# Patient Record
Sex: MALE | Race: WHITE | NOT HISPANIC OR LATINO | URBAN - METROPOLITAN AREA
[De-identification: names, ages, dates, MRNs, and addresses within clinical notes are randomized per-mention and may not be internally consistent; named-entity substitution may affect disease eponyms.]

---

## 2019-10-22 ENCOUNTER — CONSULT (OUTPATIENT)
Dept: ONCOLOGY | Facility: CLINIC | Age: 49
End: 2019-10-22

## 2019-10-22 ENCOUNTER — APPOINTMENT (OUTPATIENT)
Dept: LAB | Facility: HOSPITAL | Age: 49
End: 2019-10-22

## 2019-10-22 VITALS
BODY MASS INDEX: 32.56 KG/M2 | SYSTOLIC BLOOD PRESSURE: 130 MMHG | OXYGEN SATURATION: 95 % | WEIGHT: 240.4 LBS | RESPIRATION RATE: 16 BRPM | TEMPERATURE: 97.5 F | HEART RATE: 86 BPM | HEIGHT: 72 IN | DIASTOLIC BLOOD PRESSURE: 90 MMHG

## 2019-10-22 DIAGNOSIS — R16.0 LIVER MASS: Primary | ICD-10-CM

## 2019-10-22 DIAGNOSIS — C18.7 PRIMARY MALIGNANT NEOPLASM OF SIGMOID COLON (HCC): Primary | ICD-10-CM

## 2019-10-22 LAB
ALBUMIN SERPL-MCNC: 4.1 G/DL (ref 3.5–5.2)
ALBUMIN/GLOB SERPL: 1 G/DL (ref 1.1–2.4)
ALP SERPL-CCNC: 151 U/L (ref 38–116)
ALT SERPL W P-5'-P-CCNC: 32 U/L (ref 0–41)
ANION GAP SERPL CALCULATED.3IONS-SCNC: 13 MMOL/L (ref 5–15)
AST SERPL-CCNC: 46 U/L (ref 0–40)
BASOPHILS # BLD AUTO: 0.07 10*3/MM3 (ref 0–0.2)
BASOPHILS NFR BLD AUTO: 0.8 % (ref 0–1.5)
BILIRUB SERPL-MCNC: 0.7 MG/DL (ref 0.2–1.2)
BUN BLD-MCNC: 8 MG/DL (ref 6–20)
BUN/CREAT SERPL: 8.2 (ref 7.3–30)
CALCIUM SPEC-SCNC: 9.5 MG/DL (ref 8.5–10.2)
CEA SERPL-MCNC: 4399 NG/ML
CHLORIDE SERPL-SCNC: 96 MMOL/L (ref 98–107)
CO2 SERPL-SCNC: 27 MMOL/L (ref 22–29)
CREAT BLD-MCNC: 0.97 MG/DL (ref 0.7–1.3)
DEPRECATED RDW RBC AUTO: 41.1 FL (ref 37–54)
EOSINOPHIL # BLD AUTO: 0.68 10*3/MM3 (ref 0–0.4)
EOSINOPHIL NFR BLD AUTO: 8.1 % (ref 0.3–6.2)
ERYTHROCYTE [DISTWIDTH] IN BLOOD BY AUTOMATED COUNT: 14.5 % (ref 12.3–15.4)
GFR SERPL CREATININE-BSD FRML MDRD: 82 ML/MIN/1.73
GLOBULIN UR ELPH-MCNC: 4.2 GM/DL (ref 1.8–3.5)
GLUCOSE BLD-MCNC: 95 MG/DL (ref 74–124)
HCT VFR BLD AUTO: 45.3 % (ref 37.5–51)
HGB BLD-MCNC: 14.7 G/DL (ref 13–17.7)
IMM GRANULOCYTES # BLD AUTO: 0.05 10*3/MM3 (ref 0–0.05)
IMM GRANULOCYTES NFR BLD AUTO: 0.6 % (ref 0–0.5)
LYMPHOCYTES # BLD AUTO: 1.83 10*3/MM3 (ref 0.7–3.1)
LYMPHOCYTES NFR BLD AUTO: 21.9 % (ref 19.6–45.3)
MCH RBC QN AUTO: 25.7 PG (ref 26.6–33)
MCHC RBC AUTO-ENTMCNC: 32.5 G/DL (ref 31.5–35.7)
MCV RBC AUTO: 79.1 FL (ref 79–97)
MONOCYTES # BLD AUTO: 0.85 10*3/MM3 (ref 0.1–0.9)
MONOCYTES NFR BLD AUTO: 10.2 % (ref 5–12)
NEUTROPHILS # BLD AUTO: 4.89 10*3/MM3 (ref 1.7–7)
NEUTROPHILS NFR BLD AUTO: 58.4 % (ref 42.7–76)
NRBC BLD AUTO-RTO: 0 /100 WBC (ref 0–0.2)
PLATELET # BLD AUTO: 339 10*3/MM3 (ref 140–450)
PMV BLD AUTO: 9.2 FL (ref 6–12)
POTASSIUM BLD-SCNC: 4.4 MMOL/L (ref 3.5–4.7)
PROT SERPL-MCNC: 8.3 G/DL (ref 6.3–8)
RBC # BLD AUTO: 5.73 10*6/MM3 (ref 4.14–5.8)
SODIUM BLD-SCNC: 136 MMOL/L (ref 134–145)
WBC NRBC COR # BLD: 8.37 10*3/MM3 (ref 3.4–10.8)

## 2019-10-22 PROCEDURE — 80053 COMPREHEN METABOLIC PANEL: CPT | Performed by: INTERNAL MEDICINE

## 2019-10-22 PROCEDURE — G0463 HOSPITAL OUTPT CLINIC VISIT: HCPCS | Performed by: INTERNAL MEDICINE

## 2019-10-22 PROCEDURE — 36415 COLL VENOUS BLD VENIPUNCTURE: CPT | Performed by: INTERNAL MEDICINE

## 2019-10-22 PROCEDURE — 36416 COLLJ CAPILLARY BLOOD SPEC: CPT

## 2019-10-22 PROCEDURE — 85025 COMPLETE CBC W/AUTO DIFF WBC: CPT

## 2019-10-22 PROCEDURE — 82378 CARCINOEMBRYONIC ANTIGEN: CPT | Performed by: INTERNAL MEDICINE

## 2019-10-22 PROCEDURE — 99205 OFFICE O/P NEW HI 60 MIN: CPT | Performed by: INTERNAL MEDICINE

## 2019-10-22 RX ORDER — TRAMADOL HYDROCHLORIDE 50 MG/1
50 TABLET ORAL EVERY 6 HOURS PRN
COMMUNITY
End: 2019-11-18

## 2019-10-22 RX ORDER — ONDANSETRON HYDROCHLORIDE 8 MG/1
TABLET, FILM COATED ORAL
Refills: 3 | COMMUNITY
Start: 2019-10-16 | End: 2020-01-20 | Stop reason: SDDI

## 2019-10-22 RX ORDER — RANITIDINE 150 MG/1
150 TABLET ORAL 2 TIMES DAILY
COMMUNITY
End: 2020-06-01

## 2019-10-22 RX ORDER — LISINOPRIL 10 MG/1
10 TABLET ORAL DAILY
COMMUNITY

## 2019-10-22 RX ORDER — AMLODIPINE BESYLATE 5 MG/1
5 TABLET ORAL DAILY
COMMUNITY

## 2019-10-22 RX ORDER — OXYCODONE HYDROCHLORIDE 5 MG/1
TABLET ORAL
Refills: 0 | COMMUNITY
Start: 2019-10-16 | End: 2019-11-18

## 2019-10-22 RX ORDER — PROMETHAZINE HYDROCHLORIDE 25 MG/1
TABLET ORAL
Refills: 4 | COMMUNITY
Start: 2019-10-16

## 2019-10-22 RX ORDER — ATORVASTATIN CALCIUM 40 MG/1
40 TABLET, FILM COATED ORAL DAILY
COMMUNITY

## 2019-10-22 NOTE — PROGRESS NOTES
REFERRING PROVIDER:    Jami Lazo MD  [address] [place] [address], [place] [address]    REASON FOR CONSULTATION: Metastatic colon cancer    HISTORY OF PRESENT ILLNESS:  Silver Sloan is a 49 y.o. male who is referred today to discuss treatment options for metastatic colon cancer.    About a month prior to presenting, he developed epigastric pain that gradually worsened to include right upper quadrant abdominal pain.  An H2 blocker did not help.  He developed anorexia and diarrhea.  He denies any change in his stool or constipation.    He lives in [facility].  He presented to [facility] for further care.  CT imaging indicated liver lesions.  He had a CT-guided liver biopsy performed on 10/14/2019 with pathology consistent with adenocarcinoma metastatic from colon.  He had a PET scan performed on 10/18/2019 which revealed a 2.7 cm mass in the sigmoid colon thought to be the primary malignancy.  The liver is enlarged measuring 22 cm.  There is a 14.3 cm mass in the right lobe of the liver with an SUV of 6.52 and an 11 cm mass in the left lobe of the liver with an SUV of 6.38.  There is a 1.5 cm lesion in the inferior right lobe of the liver with an SUV of 4.39.  There was no other evidence for metastatic disease.  There is no lymphadenopathy in the neck chest abdomen pelvis or retroperitoneum.  Multinodular goiter with thyromegaly was visualized.  No bowel obstruction was visualized.  An umbilical hernia and a left inguinal hernia were visualized.  There is some degenerative disc disease in the lumbar spine.    He saw an oncologist in [place].  He has family that lives here in [place] and he believes that he wants to be treated here in [place].  A Mediport is scheduled in [place] on Friday.    His right upper quadrant abdominal pain is adequately controlled with oxycodone 5 mg tablets 1-2 every 6 hours as needed.  He does continue to have some sharp right lower  chest pain with deep breaths.  He has nausea that is well controlled with Zofran and Phenergan.  He is having bowel movements with a bowel regimen.  He has lost about 25 pounds over the past month or so.            Past Medical History:   Diagnosis Date   • Hyperlipidemia    • Hypertension    • Metastatic adenocarcinoma (CMS/HCC) 2019    Liver tumor       Past Surgical History:   Procedure Laterality Date   • LIVER BIOPSY  2019    Core needle biopsy       SOCIAL HISTORY:   reports that he has never smoked. He has never used smokeless tobacco. He reports that he drinks alcohol. His drug history is not on file.    FAMILY HISTORY:  family history includes Cancer in an other family member.  Maternal grandfather with a history of colon cancer.  Brother with a history of multiple colon polyps.    ALLERGIES:  No Known Allergies    MEDICATIONS:  The medication list has been reviewed with the patient by the medical assistant, and the list has been updated in the electronic medical record, which I reviewed.  Medication dosages and frequencies were confirmed to be accurate.    Review of Systems   Review of Systems   Constitutional: Positive for appetite change, fatigue and unexpected weight change. Negative for chills and fever.   HENT: Negative for congestion, dental problem, ear pain, hearing loss, mouth sores, nosebleeds, postnasal drip, rhinorrhea, tinnitus and trouble swallowing.    Eyes: Negative.    Respiratory: Negative for cough, chest tightness, shortness of breath, wheezing and stridor.    Cardiovascular: Positive for chest pain. Negative for palpitations and leg swelling.   Gastrointestinal: Positive for abdominal pain, diarrhea and nausea. Negative for abdominal distention, blood in stool, constipation and vomiting.   Endocrine: Negative.    Genitourinary: Negative for decreased urine volume, difficulty urinating, dysuria, flank pain, frequency, hematuria, scrotal swelling, testicular pain and urgency.  "  Musculoskeletal: Negative for arthralgias, back pain and joint swelling.   Allergic/Immunologic: Negative.    Neurological: Negative for dizziness, seizures, syncope, weakness, light-headedness, numbness and headaches.   Hematological: Negative for adenopathy. Does not bruise/bleed easily.   Psychiatric/Behavioral: Negative for confusion and sleep disturbance. The patient is not nervous/anxious.    All other systems reviewed and are negative.      Vitals:    10/22/19 0959   BP: 130/90   Pulse: 86   Resp: 16   Temp: 97.5 °F (36.4 °C)   TempSrc: Oral   SpO2: 95%   Weight: 109 kg (240 lb 6.4 oz)   Height: 183 cm (72.05\")   PainSc: 0-No pain  Comment: liver mass       Physical Exam   Constitutional: He is oriented to person, place, and time. He appears well-developed and well-nourished. No distress.   HENT:   Head: Normocephalic and atraumatic. Hair is normal.   Mouth/Throat: Oropharynx is clear and moist.   Eyes: Conjunctivae, EOM and lids are normal. Pupils are equal.   Neck: Neck supple. No JVD present. No thyroid mass and no thyromegaly present.   Cardiovascular: Normal rate and regular rhythm. Exam reveals no gallop and no friction rub.   No murmur heard.  Pulmonary/Chest: Effort normal and breath sounds normal. No respiratory distress. He has no wheezes. He has no rales.   Abdominal: Soft. He exhibits no distension and no mass. There is no splenomegaly or hepatomegaly. There is tenderness in the right upper quadrant. There is no guarding.   Musculoskeletal: Normal range of motion. He exhibits no edema, tenderness or deformity.   Lymphadenopathy:     He has no cervical adenopathy.     He has no axillary adenopathy.        Right: No inguinal and no supraclavicular adenopathy present.        Left: No inguinal and no supraclavicular adenopathy present.   Neurological: He is alert and oriented to person, place, and time. He has normal strength.   Skin: Skin is warm and dry. No rash noted.   Psychiatric: He has a " normal mood and affect. His behavior is normal. Judgment and thought content normal. Cognition and memory are normal.   Nursing note and vitals reviewed.      DIAGNOSTIC DATA:    Results for orders placed or performed in visit on 10/22/19   CBC Auto Differential   Result Value Ref Range    WBC 8.37 3.40 - 10.80 10*3/mm3    RBC 5.73 4.14 - 5.80 10*6/mm3    Hemoglobin 14.7 13.0 - 17.7 g/dL    Hematocrit 45.3 37.5 - 51.0 %    MCV 79.1 79.0 - 97.0 fL    MCH 25.7 (L) 26.6 - 33.0 pg    MCHC 32.5 31.5 - 35.7 g/dL    RDW 14.5 12.3 - 15.4 %    RDW-SD 41.1 37.0 - 54.0 fl    MPV 9.2 6.0 - 12.0 fL    Platelets 339 140 - 450 10*3/mm3    Neutrophil % 58.4 42.7 - 76.0 %    Lymphocyte % 21.9 19.6 - 45.3 %    Monocyte % 10.2 5.0 - 12.0 %    Eosinophil % 8.1 (H) 0.3 - 6.2 %    Basophil % 0.8 0.0 - 1.5 %    Immature Grans % 0.6 (H) 0.0 - 0.5 %    Neutrophils, Absolute 4.89 1.70 - 7.00 10*3/mm3    Lymphocytes, Absolute 1.83 0.70 - 3.10 10*3/mm3    Monocytes, Absolute 0.85 0.10 - 0.90 10*3/mm3    Eosinophils, Absolute 0.68 (H) 0.00 - 0.40 10*3/mm3    Basophils, Absolute 0.07 0.00 - 0.20 10*3/mm3    Immature Grans, Absolute 0.05 0.00 - 0.05 10*3/mm3    nRBC 0.0 0.0 - 0.2 /100 WBC       IMAGING:    None available for review    ASSESSMENT:  This is a 49 y.o. male with:  1.  Metastatic colon cancer: There is a 2.7 cm hypermetabolic lesion in the sigmoid colon on PET scan.  He has not had a colonoscopy or biopsy of this.  He presented with right upper quadrant abdominal pain.  He has 3 lesions in the liver 2 of which are very large and 1 of which is much smaller.  A biopsy of 1 of these lesions has revealed metastatic colon cancer.  Further molecular testing has been sent in [place].  He lives in [place] but has a sibling that lives here in [place].  He desires to be treated here in [place].  I advised him, his brother, and his sister today that this is treatable but not likely to be curable.  His brother inquired about the  possibility of local therapy to liver.  I do think this is reasonable to consider but I think that we will need to utilize systemic therapy to shrink the tumors prior to considering this.  Nonetheless, they desire referral to Dr. Painting today and I have made this referral today.  [facility] in [place].  I certainly agree with this.  We will obtain some baseline labs today including a CMP and CEA.  Due to his young age and family history I will refer him to genetic counseling.  We will request the PET images for my review.  I discussed with him and his siblings today palliative therapy with FOLFIRI.  We may tailor this based on molecular results and depending on his response and plans for local therapy to the liver.  We might consider adding bevacizumab or cetuximab/Panitumumab.  He will have an education session and we will plan to get started in the next couple of weeks.  I advised him and his siblings today that intent is palliative.  They agreed to proceed.  2.  Cancer related pain: He continues oxycodone 5 to 10 mg every 6 hours as needed.  He states his pain is adequately controlled on this regimen.  When he requires a refill my office will refill this and I advised him and his siblings today that our office will be the only office that can fill his pain medication at that point.  His pain should improve as we decrease the size of the liver tumors with chemotherapy.    PLAN:   1.  Refer to Dr. Painting with surgical oncology to consider the possibility of local therapy to the liver and perhaps the colon at some point.  The patient's brother who was present today has worked with Dr. Painting in the past.  2.  We will request his PET images for my review and to be uploaded into our imaging system.  3.  We await the results of the genetic panel sent by pathology in [place].  4.  Refer to genetic counseling  5.  Baseline labs today including a CMP and CEA  6.  He has [facility]  placement scheduled on Friday in [place] this week and I certainly agree with this.  7.  We will arrange an education session for NIMISHA with a nurse practitioner and have him follow-up with a nurse practitioner in the next week or 2 to initiate therapy.  8.  I will see him back with cycle #2 of therapy.  CT imaging after cycle #4.

## 2019-10-29 ENCOUNTER — OFFICE VISIT (OUTPATIENT)
Dept: ONCOLOGY | Facility: CLINIC | Age: 49
End: 2019-10-29

## 2019-10-29 ENCOUNTER — APPOINTMENT (OUTPATIENT)
Dept: LAB | Facility: HOSPITAL | Age: 49
End: 2019-10-29

## 2019-10-29 VITALS — BODY MASS INDEX: 32.44 KG/M2 | WEIGHT: 239.5 LBS

## 2019-10-29 DIAGNOSIS — C18.7 PRIMARY MALIGNANT NEOPLASM OF SIGMOID COLON (HCC): Primary | ICD-10-CM

## 2019-10-29 PROCEDURE — G0463 HOSPITAL OUTPT CLINIC VISIT: HCPCS | Performed by: NURSE PRACTITIONER

## 2019-10-29 PROCEDURE — 99215 OFFICE O/P EST HI 40 MIN: CPT | Performed by: NURSE PRACTITIONER

## 2019-10-29 NOTE — PROGRESS NOTES
____________________PATIENT EDUCATION____________________    PATIENT EDUCATION:  Today I met with the patient to discuss the chemotherapy regimen recommended for treatment of his disease.  The patient was given explanation of treatment premed side effects including office policy that prohibits patients to drive if sedating medications are administered, MD explanation given regarding benefits, side effects, toxicities and goals of treatment.  The patient received a Chemotherapy/Biotherapy Plan Summary including diagnosis and specific treatment plan.    SIDE EFFECTS:  Common side effects were discussed with the patient and/or significant other.  Discussion included hair loss/discoloration, anemia/fatigue, infection/chills/fever, appetite, bleeding risk/precautions, constipation, diarrhea, mouth sores, taste alteration, loss of appetite,nausea/vomiting, peripheral neuropathy, skin/nail changes, rash, muscle aches/weakness, photosensitivity, weight gain/loss, hearing loss, dizziness, menopausal symptoms, menstrrual irregularity, sterility, high blood pressure, heart damage, liver damage, lung damage, kidney damage, DVT/PE risk, fluid retention, pleural/pericardial effusion, somnolence, electrolyte/LFT imbalance, vein exercises and/or the possible need for vascular access/port placement.  The patient was advice that although uncommon, leakage of an infused medication from the vein or venous access device (port) may lead to skin breakdown and/or other tissue damage.  The patient was advised that he/she may have pain, bleeding, and/or bruising from the insertion of a needle in their vein or venous access device (port).  The patient was further advised that, in spite of proper technique, infection with redness and irritation may rarely occur at the site where the needle was inserted.  The patient was advised that if complications occur, additional medical treatment is available.    Discussion also included side effects  specific to drugs in the treatment plan, specifically a few, leucovorin, Irinotecan.    Reproductive risks were discussed, including appropriate use of birth control and protection during sexual relations.  Physical Exam   Constitutional: He is oriented to person, place, and time. He appears well-developed and well-nourished. No distress.   Pulmonary/Chest: Effort normal. No respiratory distress.   Musculoskeletal: He exhibits no edema.   Neurological: He is alert and oriented to person, place, and time.   Skin: Skin is warm and dry.   Psychiatric: He has a normal mood and affect.         Survivorship referral placed if appropriate no.    A total of 45 minutes were spent with the patient, with 100% of time spent in education and counseling.

## 2019-10-31 PROBLEM — Z45.2 ENCOUNTER FOR FITTING AND ADJUSTMENT OF VASCULAR CATHETER: Status: ACTIVE | Noted: 2019-10-31

## 2019-10-31 RX ORDER — SODIUM CHLORIDE 0.9 % (FLUSH) 0.9 %
10 SYRINGE (ML) INJECTION AS NEEDED
Status: CANCELLED | OUTPATIENT
Start: 2019-10-31

## 2019-11-04 ENCOUNTER — INFUSION (OUTPATIENT)
Dept: ONCOLOGY | Facility: HOSPITAL | Age: 49
End: 2019-11-04

## 2019-11-04 ENCOUNTER — OFFICE VISIT (OUTPATIENT)
Dept: ONCOLOGY | Facility: CLINIC | Age: 49
End: 2019-11-04

## 2019-11-04 VITALS
TEMPERATURE: 98.2 F | HEIGHT: 72 IN | DIASTOLIC BLOOD PRESSURE: 85 MMHG | SYSTOLIC BLOOD PRESSURE: 138 MMHG | WEIGHT: 231.4 LBS | RESPIRATION RATE: 16 BRPM | HEART RATE: 98 BPM | BODY MASS INDEX: 31.34 KG/M2 | OXYGEN SATURATION: 97 %

## 2019-11-04 DIAGNOSIS — C18.7 PRIMARY MALIGNANT NEOPLASM OF SIGMOID COLON (HCC): ICD-10-CM

## 2019-11-04 DIAGNOSIS — C18.7 PRIMARY MALIGNANT NEOPLASM OF SIGMOID COLON (HCC): Primary | ICD-10-CM

## 2019-11-04 LAB
ALBUMIN SERPL-MCNC: 3.8 G/DL (ref 3.5–5.2)
ALBUMIN/GLOB SERPL: 0.9 G/DL (ref 1.1–2.4)
ALP SERPL-CCNC: 220 U/L (ref 38–116)
ALT SERPL W P-5'-P-CCNC: 37 U/L (ref 0–41)
ANION GAP SERPL CALCULATED.3IONS-SCNC: 15.1 MMOL/L (ref 5–15)
AST SERPL-CCNC: 50 U/L (ref 0–40)
BASOPHILS # BLD AUTO: 0.06 10*3/MM3 (ref 0–0.2)
BASOPHILS NFR BLD AUTO: 0.6 % (ref 0–1.5)
BILIRUB SERPL-MCNC: 0.7 MG/DL (ref 0.2–1.2)
BUN BLD-MCNC: 11 MG/DL (ref 6–20)
BUN/CREAT SERPL: 12.4 (ref 7.3–30)
CALCIUM SPEC-SCNC: 9.2 MG/DL (ref 8.5–10.2)
CHLORIDE SERPL-SCNC: 100 MMOL/L (ref 98–107)
CO2 SERPL-SCNC: 22.9 MMOL/L (ref 22–29)
CREAT BLD-MCNC: 0.89 MG/DL (ref 0.7–1.3)
DEPRECATED RDW RBC AUTO: 42.5 FL (ref 37–54)
EOSINOPHIL # BLD AUTO: 1.05 10*3/MM3 (ref 0–0.4)
EOSINOPHIL NFR BLD AUTO: 10.3 % (ref 0.3–6.2)
ERYTHROCYTE [DISTWIDTH] IN BLOOD BY AUTOMATED COUNT: 14.6 % (ref 12.3–15.4)
GFR SERPL CREATININE-BSD FRML MDRD: 110 ML/MIN/1.73
GFR SERPL CREATININE-BSD FRML MDRD: 91 ML/MIN/1.73
GLOBULIN UR ELPH-MCNC: 4.4 GM/DL (ref 1.8–3.5)
GLUCOSE BLD-MCNC: 165 MG/DL (ref 74–124)
HCT VFR BLD AUTO: 44.5 % (ref 37.5–51)
HGB BLD-MCNC: 14.1 G/DL (ref 13–17.7)
IMM GRANULOCYTES # BLD AUTO: 0.05 10*3/MM3 (ref 0–0.05)
IMM GRANULOCYTES NFR BLD AUTO: 0.5 % (ref 0–0.5)
LYMPHOCYTES # BLD AUTO: 1.7 10*3/MM3 (ref 0.7–3.1)
LYMPHOCYTES NFR BLD AUTO: 16.7 % (ref 19.6–45.3)
MCH RBC QN AUTO: 25.7 PG (ref 26.6–33)
MCHC RBC AUTO-ENTMCNC: 31.7 G/DL (ref 31.5–35.7)
MCV RBC AUTO: 81.1 FL (ref 79–97)
MONOCYTES # BLD AUTO: 0.73 10*3/MM3 (ref 0.1–0.9)
MONOCYTES NFR BLD AUTO: 7.2 % (ref 5–12)
NEUTROPHILS # BLD AUTO: 6.58 10*3/MM3 (ref 1.7–7)
NEUTROPHILS NFR BLD AUTO: 64.7 % (ref 42.7–76)
NRBC BLD AUTO-RTO: 0 /100 WBC (ref 0–0.2)
PLATELET # BLD AUTO: 359 10*3/MM3 (ref 140–450)
PMV BLD AUTO: 8.7 FL (ref 6–12)
POTASSIUM BLD-SCNC: 3.8 MMOL/L (ref 3.5–4.7)
PROT SERPL-MCNC: 8.2 G/DL (ref 6.3–8)
RBC # BLD AUTO: 5.49 10*6/MM3 (ref 4.14–5.8)
SODIUM BLD-SCNC: 138 MMOL/L (ref 134–145)
WBC NRBC COR # BLD: 10.17 10*3/MM3 (ref 3.4–10.8)

## 2019-11-04 PROCEDURE — 96411 CHEMO IV PUSH ADDL DRUG: CPT | Performed by: NURSE PRACTITIONER

## 2019-11-04 PROCEDURE — 96375 TX/PRO/DX INJ NEW DRUG ADDON: CPT | Performed by: NURSE PRACTITIONER

## 2019-11-04 PROCEDURE — 80053 COMPREHEN METABOLIC PANEL: CPT

## 2019-11-04 PROCEDURE — 25010000002 PROMETHAZINE PER 50 MG: Performed by: NURSE PRACTITIONER

## 2019-11-04 PROCEDURE — 25010000002 PALONOSETRON PER 25 MCG: Performed by: INTERNAL MEDICINE

## 2019-11-04 PROCEDURE — 25010000002 ATROPINE PER 0.01 MG: Performed by: INTERNAL MEDICINE

## 2019-11-04 PROCEDURE — 85025 COMPLETE CBC W/AUTO DIFF WBC: CPT

## 2019-11-04 PROCEDURE — 25010000002 IRINOTECAN PER 20 MG: Performed by: INTERNAL MEDICINE

## 2019-11-04 PROCEDURE — 96415 CHEMO IV INFUSION ADDL HR: CPT | Performed by: NURSE PRACTITIONER

## 2019-11-04 PROCEDURE — 96416 CHEMO PROLONG INFUSE W/PUMP: CPT | Performed by: NURSE PRACTITIONER

## 2019-11-04 PROCEDURE — 99213 OFFICE O/P EST LOW 20 MIN: CPT | Performed by: NURSE PRACTITIONER

## 2019-11-04 PROCEDURE — 96368 THER/DIAG CONCURRENT INF: CPT | Performed by: NURSE PRACTITIONER

## 2019-11-04 PROCEDURE — 25010000002 LEUCOVORIN CALCIUM PER 50 MG: Performed by: INTERNAL MEDICINE

## 2019-11-04 PROCEDURE — 25010000002 FLUOROURACIL PER 500 MG: Performed by: INTERNAL MEDICINE

## 2019-11-04 PROCEDURE — 25010000002 DEXAMETHASONE SODIUM PHOSPHATE 100 MG/10ML SOLUTION: Performed by: INTERNAL MEDICINE

## 2019-11-04 PROCEDURE — 96413 CHEMO IV INFUSION 1 HR: CPT | Performed by: NURSE PRACTITIONER

## 2019-11-04 RX ORDER — PALONOSETRON 0.05 MG/ML
0.25 INJECTION, SOLUTION INTRAVENOUS ONCE
Status: CANCELLED | OUTPATIENT
Start: 2019-11-04

## 2019-11-04 RX ORDER — ATROPINE SULFATE 0.4 MG/ML
0.25 AMPUL (ML) INJECTION
Status: DISCONTINUED | OUTPATIENT
Start: 2019-11-04 | End: 2019-11-04 | Stop reason: HOSPADM

## 2019-11-04 RX ORDER — SODIUM CHLORIDE 9 MG/ML
250 INJECTION, SOLUTION INTRAVENOUS ONCE
Status: COMPLETED | OUTPATIENT
Start: 2019-11-04 | End: 2019-11-04

## 2019-11-04 RX ORDER — FLUOROURACIL 50 MG/ML
400 INJECTION, SOLUTION INTRAVENOUS ONCE
Status: COMPLETED | OUTPATIENT
Start: 2019-11-04 | End: 2019-11-04

## 2019-11-04 RX ORDER — SODIUM CHLORIDE 9 MG/ML
250 INJECTION, SOLUTION INTRAVENOUS ONCE
Status: CANCELLED | OUTPATIENT
Start: 2019-11-04

## 2019-11-04 RX ORDER — FLUOROURACIL 50 MG/ML
400 INJECTION, SOLUTION INTRAVENOUS ONCE
Status: CANCELLED | OUTPATIENT
Start: 2019-11-04

## 2019-11-04 RX ORDER — ATROPINE SULFATE 1 MG/ML
0.25 INJECTION, SOLUTION INTRAMUSCULAR; INTRAVENOUS; SUBCUTANEOUS
Status: CANCELLED | OUTPATIENT
Start: 2019-11-04

## 2019-11-04 RX ORDER — PROMETHAZINE HYDROCHLORIDE 25 MG/ML
12.5 INJECTION, SOLUTION INTRAMUSCULAR; INTRAVENOUS ONCE
Status: COMPLETED | OUTPATIENT
Start: 2019-11-04 | End: 2019-11-04

## 2019-11-04 RX ORDER — PALONOSETRON 0.05 MG/ML
0.25 INJECTION, SOLUTION INTRAVENOUS ONCE
Status: COMPLETED | OUTPATIENT
Start: 2019-11-04 | End: 2019-11-04

## 2019-11-04 RX ADMIN — PALONOSETRON 0.25 MG: 0.05 INJECTION, SOLUTION INTRAVENOUS at 09:30

## 2019-11-04 RX ADMIN — DEXAMETHASONE SODIUM PHOSPHATE 12 MG: 10 INJECTION, SOLUTION INTRAMUSCULAR; INTRAVENOUS at 09:31

## 2019-11-04 RX ADMIN — SODIUM CHLORIDE 920 MG: 900 INJECTION, SOLUTION INTRAVENOUS at 10:58

## 2019-11-04 RX ADMIN — FLUOROURACIL 920 MG: 50 INJECTION, SOLUTION INTRAVENOUS at 12:34

## 2019-11-04 RX ADMIN — PROMETHAZINE HYDROCHLORIDE 12.5 MG: 25 INJECTION INTRAMUSCULAR; INTRAVENOUS at 12:46

## 2019-11-04 RX ADMIN — ATROPINE SULFATE 0.25 MG: 0.4 INJECTION, SOLUTION INTRAMUSCULAR; INTRAVENOUS; SUBCUTANEOUS at 10:55

## 2019-11-04 RX ADMIN — DEXTROSE MONOHYDRATE 415 MG: 5 INJECTION, SOLUTION INTRAVENOUS at 10:59

## 2019-11-04 RX ADMIN — SODIUM CHLORIDE 250 ML: 9 INJECTION, SOLUTION INTRAVENOUS at 09:31

## 2019-11-04 RX ADMIN — FLUOROURACIL 5520 MG: 50 INJECTION, SOLUTION INTRAVENOUS at 12:34

## 2019-11-04 NOTE — NURSING NOTE
At end of treatment, pt c/o nausea. Denies stomach cramping, just nausea. Reviewed with Jason NP who ordered for pt to get phenergan 12.5mg IV. Pt tolerated treatment with no other issues. Went over chemo spill kit with pt and his siblings. Pt voiced no questions upon discharge.

## 2019-11-06 ENCOUNTER — INFUSION (OUTPATIENT)
Dept: ONCOLOGY | Facility: HOSPITAL | Age: 49
End: 2019-11-06

## 2019-11-06 DIAGNOSIS — Z45.2 ENCOUNTER FOR FITTING AND ADJUSTMENT OF VASCULAR CATHETER: ICD-10-CM

## 2019-11-06 DIAGNOSIS — C18.7 PRIMARY MALIGNANT NEOPLASM OF SIGMOID COLON (HCC): Primary | ICD-10-CM

## 2019-11-06 RX ORDER — CHLORPROMAZINE HYDROCHLORIDE 25 MG/1
25 TABLET, FILM COATED ORAL EVERY 6 HOURS PRN
Qty: 30 TABLET | Refills: 0 | Status: SHIPPED | OUTPATIENT
Start: 2019-11-06 | End: 2019-11-18 | Stop reason: SDDI

## 2019-11-06 RX ORDER — SODIUM CHLORIDE 0.9 % (FLUSH) 0.9 %
10 SYRINGE (ML) INJECTION AS NEEDED
Status: CANCELLED | OUTPATIENT
Start: 2019-11-06

## 2019-11-06 RX ORDER — SODIUM CHLORIDE 0.9 % (FLUSH) 0.9 %
10 SYRINGE (ML) INJECTION AS NEEDED
Status: DISCONTINUED | OUTPATIENT
Start: 2019-11-06 | End: 2019-11-06 | Stop reason: HOSPADM

## 2019-11-06 RX ADMIN — SODIUM CHLORIDE, PRESERVATIVE FREE 500 UNITS: 5 INJECTION INTRAVENOUS at 10:33

## 2019-11-06 RX ADMIN — Medication 10 ML: at 10:33

## 2019-11-06 NOTE — TELEPHONE ENCOUNTER
Per Dr Georges message, RX for thorazine 25mg q 6hr prn for hiccups sent to pt pharmacy  #30 , no refills  Called patient , no answer, left detailed message on VM

## 2019-11-06 NOTE — PROGRESS NOTES
Patient is having c/o hiccups since Monday night.   Message sent to Dr Georges to see if there is anything patient can take to help.

## 2019-11-06 NOTE — TELEPHONE ENCOUNTER
----- Message from Marty Georges MD sent at 11/6/2019  1:35 PM EST -----  Thorazine 25mg q6h prn #30.  Please send in rx    ----- Message -----  From: Cintia Galvan RN  Sent: 11/6/2019  10:31 AM  To: Marty Georges MD    Patient here today for unhook and has been having hiccups, on and off since Monday night. Is there anything he can take to help? Please respond to clinical pool.  Thank you.

## 2019-11-09 RX ORDER — MORPHINE SULFATE 15 MG/1
15 TABLET ORAL
Qty: 90 TABLET | Refills: 0 | Status: SHIPPED | OUTPATIENT
Start: 2019-11-09 | End: 2020-09-22

## 2019-11-09 NOTE — PROGRESS NOTES
ON CALL MD    Patient called in with abdominal pain-not relieved with oxycodone.    Sent in Rx for msir 15mg. He will start with half tablet.  If no relief, go to 1 tablet.  Can then go up to two tabs at a time if needed.    If pain significantly worsens, I told him to go to er as he may need a ct scan.    He should call our office Monday am if still having pain for an update.    I told him usually we cannot give narcotics after office closes, but he has signficant amount of cancer in liver.

## 2019-11-11 ENCOUNTER — TELEPHONE (OUTPATIENT)
Dept: ONCOLOGY | Facility: HOSPITAL | Age: 49
End: 2019-11-11

## 2019-11-11 NOTE — TELEPHONE ENCOUNTER
----- Message from Sandy Greenwood sent at 11/11/2019 10:35 AM EST -----  [phone]  Discomfort in stomach. Called over the weekend and a script was ordered but was not in stock.      Called and spoke with pt's pharmacy. Medication was received in stock today. Called pt. He states his pain does feel a little better than it did over the weekend but still there. Informed pt that medication will be ready for  today. I have asked him to call us pain worsens or medication does not help. Pt v/u

## 2019-11-14 DIAGNOSIS — C18.7 PRIMARY MALIGNANT NEOPLASM OF SIGMOID COLON (HCC): ICD-10-CM

## 2019-11-18 ENCOUNTER — OFFICE VISIT (OUTPATIENT)
Dept: ONCOLOGY | Facility: CLINIC | Age: 49
End: 2019-11-18

## 2019-11-18 ENCOUNTER — LAB (OUTPATIENT)
Dept: LAB | Facility: HOSPITAL | Age: 49
End: 2019-11-18

## 2019-11-18 ENCOUNTER — INFUSION (OUTPATIENT)
Dept: ONCOLOGY | Facility: HOSPITAL | Age: 49
End: 2019-11-18

## 2019-11-18 VITALS
BODY MASS INDEX: 31.52 KG/M2 | TEMPERATURE: 99.1 F | HEIGHT: 72 IN | OXYGEN SATURATION: 96 % | SYSTOLIC BLOOD PRESSURE: 119 MMHG | DIASTOLIC BLOOD PRESSURE: 78 MMHG | WEIGHT: 232.7 LBS | RESPIRATION RATE: 16 BRPM | HEART RATE: 101 BPM

## 2019-11-18 VITALS — SYSTOLIC BLOOD PRESSURE: 136 MMHG | HEART RATE: 67 BPM | DIASTOLIC BLOOD PRESSURE: 85 MMHG

## 2019-11-18 DIAGNOSIS — C18.7 PRIMARY MALIGNANT NEOPLASM OF SIGMOID COLON (HCC): ICD-10-CM

## 2019-11-18 DIAGNOSIS — Z45.2 ENCOUNTER FOR FITTING AND ADJUSTMENT OF VASCULAR CATHETER: ICD-10-CM

## 2019-11-18 DIAGNOSIS — C18.7 PRIMARY MALIGNANT NEOPLASM OF SIGMOID COLON (HCC): Primary | ICD-10-CM

## 2019-11-18 LAB
ALBUMIN SERPL-MCNC: 3.9 G/DL (ref 3.5–5.2)
ALBUMIN/GLOB SERPL: 1 G/DL (ref 1.1–2.4)
ALP SERPL-CCNC: 166 U/L (ref 38–116)
ALT SERPL W P-5'-P-CCNC: 32 U/L (ref 0–41)
ANION GAP SERPL CALCULATED.3IONS-SCNC: 13.6 MMOL/L (ref 5–15)
AST SERPL-CCNC: 49 U/L (ref 0–40)
BASOPHILS # BLD AUTO: 0.07 10*3/MM3 (ref 0–0.2)
BASOPHILS NFR BLD AUTO: 1.4 % (ref 0–1.5)
BILIRUB SERPL-MCNC: 1.1 MG/DL (ref 0.2–1.2)
BUN BLD-MCNC: 12 MG/DL (ref 6–20)
BUN/CREAT SERPL: 13.8 (ref 7.3–30)
CALCIUM SPEC-SCNC: 9.1 MG/DL (ref 8.5–10.2)
CHLORIDE SERPL-SCNC: 100 MMOL/L (ref 98–107)
CO2 SERPL-SCNC: 23.4 MMOL/L (ref 22–29)
CREAT BLD-MCNC: 0.87 MG/DL (ref 0.7–1.3)
DEPRECATED RDW RBC AUTO: 40.8 FL (ref 37–54)
EOSINOPHIL # BLD AUTO: 0.51 10*3/MM3 (ref 0–0.4)
EOSINOPHIL NFR BLD AUTO: 10.2 % (ref 0.3–6.2)
ERYTHROCYTE [DISTWIDTH] IN BLOOD BY AUTOMATED COUNT: 15.2 % (ref 12.3–15.4)
GFR SERPL CREATININE-BSD FRML MDRD: 113 ML/MIN/1.73
GFR SERPL CREATININE-BSD FRML MDRD: 93 ML/MIN/1.73
GLOBULIN UR ELPH-MCNC: 3.8 GM/DL (ref 1.8–3.5)
GLUCOSE BLD-MCNC: 163 MG/DL (ref 74–124)
HCT VFR BLD AUTO: 43.2 % (ref 37.5–51)
HGB BLD-MCNC: 14.4 G/DL (ref 13–17.7)
IMM GRANULOCYTES # BLD AUTO: 0.04 10*3/MM3 (ref 0–0.05)
IMM GRANULOCYTES NFR BLD AUTO: 0.8 % (ref 0–0.5)
LYMPHOCYTES # BLD AUTO: 1.54 10*3/MM3 (ref 0.7–3.1)
LYMPHOCYTES NFR BLD AUTO: 30.9 % (ref 19.6–45.3)
MCH RBC QN AUTO: 25.9 PG (ref 26.6–33)
MCHC RBC AUTO-ENTMCNC: 33.3 G/DL (ref 31.5–35.7)
MCV RBC AUTO: 77.7 FL (ref 79–97)
MONOCYTES # BLD AUTO: 0.6 10*3/MM3 (ref 0.1–0.9)
MONOCYTES NFR BLD AUTO: 12 % (ref 5–12)
NEUTROPHILS # BLD AUTO: 2.22 10*3/MM3 (ref 1.7–7)
NEUTROPHILS NFR BLD AUTO: 44.7 % (ref 42.7–76)
NRBC BLD AUTO-RTO: 0 /100 WBC (ref 0–0.2)
PLATELET # BLD AUTO: 224 10*3/MM3 (ref 140–450)
PMV BLD AUTO: 9.6 FL (ref 6–12)
POTASSIUM BLD-SCNC: 3.8 MMOL/L (ref 3.5–4.7)
PROT SERPL-MCNC: 7.7 G/DL (ref 6.3–8)
RBC # BLD AUTO: 5.56 10*6/MM3 (ref 4.14–5.8)
SODIUM BLD-SCNC: 137 MMOL/L (ref 134–145)
WBC NRBC COR # BLD: 4.98 10*3/MM3 (ref 3.4–10.8)

## 2019-11-18 PROCEDURE — 96375 TX/PRO/DX INJ NEW DRUG ADDON: CPT | Performed by: INTERNAL MEDICINE

## 2019-11-18 PROCEDURE — 25010000002 PALONOSETRON PER 25 MCG: Performed by: INTERNAL MEDICINE

## 2019-11-18 PROCEDURE — 25010000002 PROMETHAZINE PER 50 MG: Performed by: NURSE PRACTITIONER

## 2019-11-18 PROCEDURE — 25010000002 IRINOTECAN PER 20 MG: Performed by: INTERNAL MEDICINE

## 2019-11-18 PROCEDURE — 25010000002 LEUCOVORIN CALCIUM PER 50 MG: Performed by: INTERNAL MEDICINE

## 2019-11-18 PROCEDURE — 25010000002 ATROPINE PER 0.01 MG: Performed by: INTERNAL MEDICINE

## 2019-11-18 PROCEDURE — 96411 CHEMO IV PUSH ADDL DRUG: CPT | Performed by: INTERNAL MEDICINE

## 2019-11-18 PROCEDURE — 80053 COMPREHEN METABOLIC PANEL: CPT

## 2019-11-18 PROCEDURE — 25010000002 DEXAMETHASONE SODIUM PHOSPHATE 100 MG/10ML SOLUTION: Performed by: INTERNAL MEDICINE

## 2019-11-18 PROCEDURE — 99215 OFFICE O/P EST HI 40 MIN: CPT | Performed by: INTERNAL MEDICINE

## 2019-11-18 PROCEDURE — 96416 CHEMO PROLONG INFUSE W/PUMP: CPT | Performed by: INTERNAL MEDICINE

## 2019-11-18 PROCEDURE — 96413 CHEMO IV INFUSION 1 HR: CPT | Performed by: INTERNAL MEDICINE

## 2019-11-18 PROCEDURE — 36415 COLL VENOUS BLD VENIPUNCTURE: CPT

## 2019-11-18 PROCEDURE — 25010000002 HYDROCORTISONE SODIUM SUCCINATE 100 MG RECONSTITUTED SOLUTION: Performed by: NURSE PRACTITIONER

## 2019-11-18 PROCEDURE — 25010000002 FLUOROURACIL PER 500 MG: Performed by: INTERNAL MEDICINE

## 2019-11-18 PROCEDURE — 96368 THER/DIAG CONCURRENT INF: CPT | Performed by: INTERNAL MEDICINE

## 2019-11-18 PROCEDURE — 85025 COMPLETE CBC W/AUTO DIFF WBC: CPT

## 2019-11-18 RX ORDER — SODIUM CHLORIDE 9 MG/ML
250 INJECTION, SOLUTION INTRAVENOUS ONCE
Status: CANCELLED | OUTPATIENT
Start: 2019-12-02

## 2019-11-18 RX ORDER — FLUOROURACIL 50 MG/ML
400 INJECTION, SOLUTION INTRAVENOUS ONCE
Status: CANCELLED | OUTPATIENT
Start: 2019-11-18

## 2019-11-18 RX ORDER — PALONOSETRON 0.05 MG/ML
0.25 INJECTION, SOLUTION INTRAVENOUS ONCE
Status: COMPLETED | OUTPATIENT
Start: 2019-11-18 | End: 2019-11-18

## 2019-11-18 RX ORDER — ATROPINE SULFATE 1 MG/ML
0.25 INJECTION, SOLUTION INTRAMUSCULAR; INTRAVENOUS; SUBCUTANEOUS
Status: CANCELLED | OUTPATIENT
Start: 2019-11-18

## 2019-11-18 RX ORDER — PALONOSETRON 0.05 MG/ML
0.25 INJECTION, SOLUTION INTRAVENOUS ONCE
Status: CANCELLED | OUTPATIENT
Start: 2019-11-18

## 2019-11-18 RX ORDER — PROMETHAZINE HYDROCHLORIDE 25 MG/ML
12.5 INJECTION, SOLUTION INTRAMUSCULAR; INTRAVENOUS ONCE
Status: COMPLETED | OUTPATIENT
Start: 2019-11-18 | End: 2019-11-18

## 2019-11-18 RX ORDER — FLUOROURACIL 50 MG/ML
400 INJECTION, SOLUTION INTRAVENOUS ONCE
Status: CANCELLED | OUTPATIENT
Start: 2019-12-16

## 2019-11-18 RX ORDER — SODIUM CHLORIDE 9 MG/ML
250 INJECTION, SOLUTION INTRAVENOUS ONCE
Status: CANCELLED | OUTPATIENT
Start: 2019-12-16

## 2019-11-18 RX ORDER — SODIUM CHLORIDE 9 MG/ML
250 INJECTION, SOLUTION INTRAVENOUS ONCE
Status: CANCELLED | OUTPATIENT
Start: 2019-11-18

## 2019-11-18 RX ORDER — SODIUM CHLORIDE 9 MG/ML
250 INJECTION, SOLUTION INTRAVENOUS ONCE
Status: COMPLETED | OUTPATIENT
Start: 2019-11-18 | End: 2019-11-18

## 2019-11-18 RX ORDER — ATROPINE SULFATE 1 MG/ML
0.25 INJECTION, SOLUTION INTRAMUSCULAR; INTRAVENOUS; SUBCUTANEOUS
Status: CANCELLED | OUTPATIENT
Start: 2019-12-16

## 2019-11-18 RX ORDER — FLUOROURACIL 50 MG/ML
400 INJECTION, SOLUTION INTRAVENOUS ONCE
Status: COMPLETED | OUTPATIENT
Start: 2019-11-18 | End: 2019-11-18

## 2019-11-18 RX ORDER — PALONOSETRON 0.05 MG/ML
0.25 INJECTION, SOLUTION INTRAVENOUS ONCE
Status: CANCELLED | OUTPATIENT
Start: 2019-12-16

## 2019-11-18 RX ORDER — FLUOROURACIL 50 MG/ML
400 INJECTION, SOLUTION INTRAVENOUS ONCE
Status: CANCELLED | OUTPATIENT
Start: 2019-12-02

## 2019-11-18 RX ORDER — SODIUM CHLORIDE 0.9 % (FLUSH) 0.9 %
10 SYRINGE (ML) INJECTION AS NEEDED
Status: CANCELLED | OUTPATIENT
Start: 2019-11-18

## 2019-11-18 RX ORDER — PALONOSETRON 0.05 MG/ML
0.25 INJECTION, SOLUTION INTRAVENOUS ONCE
Status: CANCELLED | OUTPATIENT
Start: 2019-12-02

## 2019-11-18 RX ORDER — ATROPINE SULFATE 0.4 MG/ML
0.25 AMPUL (ML) INJECTION
Status: DISCONTINUED | OUTPATIENT
Start: 2019-11-18 | End: 2019-11-18 | Stop reason: HOSPADM

## 2019-11-18 RX ORDER — ATROPINE SULFATE 1 MG/ML
0.25 INJECTION, SOLUTION INTRAMUSCULAR; INTRAVENOUS; SUBCUTANEOUS
Status: CANCELLED | OUTPATIENT
Start: 2019-12-02

## 2019-11-18 RX ORDER — POLYETHYLENE GLYCOL-3350 AND ELECTROLYTES WITH FLAVOR PACK 240; 5.84; 2.98; 6.72; 22.72 G/278.26G; G/278.26G; G/278.26G; G/278.26G; G/278.26G
POWDER, FOR SOLUTION ORAL
COMMUNITY
Start: 2019-11-12 | End: 2019-11-18

## 2019-11-18 RX ADMIN — PROMETHAZINE HYDROCHLORIDE 12.5 MG: 25 INJECTION INTRAMUSCULAR; INTRAVENOUS at 12:01

## 2019-11-18 RX ADMIN — HYDROCORTISONE SODIUM SUCCINATE 100 MG: 100 INJECTION, POWDER, FOR SOLUTION INTRAMUSCULAR; INTRAVENOUS at 12:02

## 2019-11-18 RX ADMIN — LEUCOVORIN CALCIUM 920 MG: 350 INJECTION, POWDER, LYOPHILIZED, FOR SOLUTION INTRAMUSCULAR; INTRAVENOUS at 10:29

## 2019-11-18 RX ADMIN — DEXAMETHASONE SODIUM PHOSPHATE 12 MG: 10 INJECTION, SOLUTION INTRAMUSCULAR; INTRAVENOUS at 09:15

## 2019-11-18 RX ADMIN — FLUOROURACIL 920 MG: 50 INJECTION, SOLUTION INTRAVENOUS at 13:28

## 2019-11-18 RX ADMIN — IRINOTECAN HYDROCHLORIDE 415 MG: 20 INJECTION, SOLUTION INTRAVENOUS at 10:29

## 2019-11-18 RX ADMIN — ATROPINE SULFATE 0.25 MG: 0.4 INJECTION, SOLUTION INTRAMUSCULAR; INTRAVENOUS; SUBCUTANEOUS at 10:26

## 2019-11-18 RX ADMIN — FLUOROURACIL 5520 MG: 50 INJECTION, SOLUTION INTRAVENOUS at 13:28

## 2019-11-18 RX ADMIN — SODIUM CHLORIDE 250 ML: 900 INJECTION, SOLUTION INTRAVENOUS at 08:54

## 2019-11-18 RX ADMIN — PALONOSETRON 0.25 MG: 0.05 INJECTION, SOLUTION INTRAVENOUS at 09:16

## 2019-11-18 NOTE — NURSING NOTE
1250 - pt called out stating that he was feeling very flushed and nauseated for the last 30 minutes.Lecuvorin and irinotican infusion paused.  RUIZ DESAI arrived at bedside, verbal order for 100 mg Solu-Cortef IV push and 12.mg phenergan IV and restart infusion in 15 min if the patients symptoms have resolved. VS within normal limits, pt is alert and orientated.   1207- Pt states that the nausea is better and he feels less flush.   1213- pt states he still feels flush but it has much improved  1221- infusion restarted  1230 - pt has no complaints of acute distress at this time.   1245 - pt is alert and orientated without any complaints of acute distress at this time.

## 2019-11-18 NOTE — PROGRESS NOTES
[facility] OUTPATIENT FOLLOW UP CLINIC VISIT    REASON FOR FOLLOW-UP:    1.  Metastatic colon cancer with large liver metastases  2.  Palliative therapy with FOLFIRI initiated on 11/4/2019.    HISTORY OF PRESENT ILLNESS:  Silver Sloan is a 49 y.o. male who returns today for follow up of the above issue.      He generally tolerated cycle 1 of FOLFIRI well.  He was having some increasing abdominal pain.  His pain medication was switched to immediate release morphine.  He has required only a couple of these with overall improvement in his pain.  He reports some fatigue which is tolerable.  Minimal nausea.  No significant diarrhea.  No bleeding.        ONCOLOGIC HISTORY:  He lives in [facility].  He presented to [facility] for further care.  CT imaging indicated liver lesions.  He had a CT-guided liver biopsy performed on 10/14/2019 with pathology consistent with adenocarcinoma metastatic from colon.  He had a PET scan performed on 10/18/2019 which revealed a 2.7 cm mass in the sigmoid colon thought to be the primary malignancy.  The liver is enlarged measuring 22 cm.  There is a 14.3 cm mass in the right lobe of the liver with an SUV of 6.52 and an 11 cm mass in the left lobe of the liver with an SUV of 6.38.  There is a 1.5 cm lesion in the inferior right lobe of the liver with an SUV of 4.39.  There was no other evidence for metastatic disease.  There is no lymphadenopathy in the neck chest abdomen pelvis or retroperitoneum.  Multinodular goiter with thyromegaly was visualized.  No bowel obstruction was visualized.  An umbilical hernia and a left inguinal hernia were visualized.  There is some degenerative disc disease in the lumbar spine.     He saw an oncologist in [place].  He has family that lives here in [place] and he believes that he wants to be treated here in [place].  A Mediport is scheduled in [place] on Friday.     His right upper quadrant  abdominal pain is adequately controlled with oxycodone 5 mg tablets 1-2 every 6 hours as needed.  He does continue to have some sharp right lower chest pain with deep breaths.  He has nausea that is well controlled with Zofran and Phenergan.  He is having bowel movements with a bowel regimen.  He has lost about 25 pounds over the past month or so.    He was initially seen in consultation on 10/22/2019.  He wanted to see a surgical oncologist and was referred to Dr. Painting who agreed with initiating therapy with chemotherapy first with surgical resection possible at a later date.  He was referred for colonoscopy and this was done at the [facility].    He initiated palliative FOLFIRI on 11/4/2019.    Cycle #2 initiated on 11/18/2019.    ALLERGIES:  No Known Allergies    MEDICATIONS:  The medication list has been reviewed with the patient by the medical assistant, and the list has been updated in the electronic medical record, which I reviewed.  Medication dosages and frequencies were confirmed to be accurate.    REVIEW OF SYSTEMS:  PAIN:  See Vital Signs below.  GENERAL:  No fevers, chills, night sweats, or unintended weight loss.  Fatigue.  SKIN:  No rashes or non-healing lesions.  HEME/LYMPH:  No abnormal bleeding.  No palpable lymphadenopathy.  EYES:  No vision changes or diplopia.  ENT:  No sore throat or difficulty swallowing.  RESPIRATORY:  No cough, shortness of breath, hemoptysis, or wheezing.  CARDIOVASCULAR:  No chest pain, palpitations, orthopnea, or dyspnea on exertion.  GASTROINTESTINAL: Intermittent abdominal pain.  Overall improved.  Having bowel movements.  GENITOURINARY:  No dysuria or hematuria.  MUSCULOSKELETAL:  No joint pain, swelling, or erythema.  NEUROLOGIC:  No dizziness, loss of consciousness, or seizures.  PSYCHIATRIC:  No depression, anxiety, or mood changes.    Vitals:    11/18/19 0758   BP: 119/78   Pulse: 101   Resp: 16   Temp: 99.1 °F (37.3 °C)   TempSrc: Oral   SpO2:  "96%   Weight: 106 kg (232 lb 11.2 oz)   Height: 183 cm (72.05\")   PainSc: 0-No pain  Comment: colon cancer       PHYSICAL EXAMINATION:  GENERAL:  Well-developed well-nourished male; awake, alert and oriented, in no acute distress.  SKIN:  Warm and dry, without rashes, purpura, or petechiae.  HEAD:  Normocephalic, atraumatic.  EARS:  Hearing intact.  NOSE:  Septum midline.  No excoriations or nasal discharge.  MOUTH:  No stomatitis or ulcers.  Lips are normal.  THROAT:  Oropharynx without lesions or exudates.  LYMPHATICS:  No cervical, supraclavicular, axillary, or inguinal lymphadenopathy.  CHEST:  Lungs are clear to auscultation bilaterally.  No wheezes, rales, or rhonchi.  Left subclavian Mediport which is benign in appearance.  HEART:  Regular rate; normal rhythm.  No murmurs, gallops or rubs.  ABDOMEN:  Soft, mild right upper quadrant tenderness to palpation without rebound or guarding.  Non-distended.  Normal active bowel sounds.  No organomegaly.  EXTREMITIES:  No clubbing, cyanosis, or edema.  NEUROLOGICAL:  No focal neurologic deficits.    DIAGNOSTIC DATA:  Results for orders placed or performed in visit on 11/18/19   CBC Auto Differential   Result Value Ref Range    WBC 4.98 3.40 - 10.80 10*3/mm3    RBC 5.56 4.14 - 5.80 10*6/mm3    Hemoglobin 14.4 13.0 - 17.7 g/dL    Hematocrit 43.2 37.5 - 51.0 %    MCV 77.7 (L) 79.0 - 97.0 fL    MCH 25.9 (L) 26.6 - 33.0 pg    MCHC 33.3 31.5 - 35.7 g/dL    RDW 15.2 12.3 - 15.4 %    RDW-SD 40.8 37.0 - 54.0 fl    MPV 9.6 6.0 - 12.0 fL    Platelets 224 140 - 450 10*3/mm3    Neutrophil % 44.7 42.7 - 76.0 %    Lymphocyte % 30.9 19.6 - 45.3 %    Monocyte % 12.0 5.0 - 12.0 %    Eosinophil % 10.2 (H) 0.3 - 6.2 %    Basophil % 1.4 0.0 - 1.5 %    Immature Grans % 0.8 (H) 0.0 - 0.5 %    Neutrophils, Absolute 2.22 1.70 - 7.00 10*3/mm3    Lymphocytes, Absolute 1.54 0.70 - 3.10 10*3/mm3    Monocytes, Absolute 0.60 0.10 - 0.90 10*3/mm3    Eosinophils, Absolute 0.51 (H) 0.00 - 0.40 " 10*3/mm3    Basophils, Absolute 0.07 0.00 - 0.20 10*3/mm3    Immature Grans, Absolute 0.04 0.00 - 0.05 10*3/mm3    nRBC 0.0 0.0 - 0.2 /100 WBC       IMAGING: None reviewed today    ASSESSMENT:  This is a 49 y.o. male with:  1.  Metastatic colon cancer: Extensive metastatic disease in the liver at diagnosis.  No other metastatic disease.  There is a 2.7 cm hypermetabolic lesion in the sigmoid colon on his PET imaging which is likely the primary tumor.  He did have a colonoscopy at the [facility] recently and we are working to get these results.  Mismatch repair intact.  K-lynnette mutation positive so he is not a candidate for Erbitux/vectibix.  BRAF mutation negative.  NRAS mutation negative.  HER-2 and M ET negative by FISH.  TRK negative.  He was referred to Dr. Painting to discuss surgical options.  We have elected to proceed with chemotherapy followed by repeat imaging and then he will be reassessed to see if he is a surgical candidate or if there are any other local therapy options with respect to the liver.  We have elected for palliative therapy with FOLFIRI.  We will consider adding Avastin depending on the surgical plan and depending on his response to therapy.      He initiated therapy on 11/4/2019.    Cycle #2 on 11/18/2019.    2.  Cancer related pain: He was having worsening pain on oxycodone and therefore this was switched to immediate release morphine.  His pain is overall better and he has required only a couple of morphine pills.    PLAN:  1.  Cycle #2 of therapy with FOLFIRI today at the same doses  2.  He will be seen by genetic counseling here in the next few weeks.  3.  Continue morphine immediate release 15 mg as needed for pain  4.  Nurse practitioner visit in 2 weeks.  I will see him back in 4 weeks.  CT imaging of the chest abdomen and pelvis in 5 weeks.  Nurse practitioner visit in 6 weeks to review imaging although I will have reviewed imaging prior to that.  I will see him back in  8 weeks for follow-up.

## 2019-11-20 ENCOUNTER — INFUSION (OUTPATIENT)
Dept: ONCOLOGY | Facility: HOSPITAL | Age: 49
End: 2019-11-20

## 2019-11-20 DIAGNOSIS — Z45.2 ENCOUNTER FOR FITTING AND ADJUSTMENT OF VASCULAR CATHETER: ICD-10-CM

## 2019-11-20 DIAGNOSIS — C18.7 PRIMARY MALIGNANT NEOPLASM OF SIGMOID COLON (HCC): Primary | ICD-10-CM

## 2019-11-20 RX ORDER — SODIUM CHLORIDE 0.9 % (FLUSH) 0.9 %
10 SYRINGE (ML) INJECTION AS NEEDED
Status: CANCELLED | OUTPATIENT
Start: 2019-11-20

## 2019-11-20 RX ORDER — SODIUM CHLORIDE 0.9 % (FLUSH) 0.9 %
10 SYRINGE (ML) INJECTION AS NEEDED
Status: DISCONTINUED | OUTPATIENT
Start: 2019-11-20 | End: 2019-11-20 | Stop reason: HOSPADM

## 2019-11-20 RX ADMIN — SODIUM CHLORIDE, PRESERVATIVE FREE 10 ML: 5 INJECTION INTRAVENOUS at 13:40

## 2019-11-20 RX ADMIN — Medication 500 UNITS: at 13:40

## 2019-12-01 NOTE — PROGRESS NOTES
[facility] GROUP OUTPATIENT FOLLOW UP CLINIC VISIT    REASON FOR FOLLOW-UP:    1.  Metastatic colon cancer with large liver metastases  2.  Palliative therapy with FOLFIRI initiated on 11/4/2019.    HISTORY OF PRESENT ILLNESS:  Silver Sloan is a 49 y.o. male who returns today for follow up of the above issue.  He returns today for consideration of cycle #3 of FOLFIRI.  He has continued to tolerate treatment reasonably well with only mild nausea relieved by oral antiemetics.  With cycle #2, patient did experience a mild reaction with flushing and nausea. This was quickly resolved with Solu-Cortef and IV Phenergan and patient was able to proceed with no further complications.  He is eating and drinking well. Bowels and urination have been regular.  He denies any significant upper or lower extremity neuropathy.  He has noted significant fatigue for the last several days, though admits that he likely overdid it with the holiday weekend.    Abdominal pain is well controlled with Ranitidine and Zofran. He denies any infectious symptoms including fevers or chills.  No excess bleeding or bruising reported.    Of note, the patient has lost 8 pounds within the last week, though reports that he has been eating sufficiently.  We will continue to monitor this closely.    His labs today demonstrate mild neutropenia with an ANC of 0.94.  All other labs are unremarkable.  Again, the patient is free of any infectious symptoms.    He has no other concerns today.        ONCOLOGIC HISTORY:  He lives in [facility].  He presented to [facility] for further care.  CT imaging indicated liver lesions.  He had a CT-guided liver biopsy performed on 10/14/2019 with pathology consistent with adenocarcinoma metastatic from colon.  He had a PET scan performed on 10/18/2019 which revealed a 2.7 cm mass in the sigmoid colon thought to be the primary malignancy.  The liver is enlarged measuring 22 cm.  There  is a 14.3 cm mass in the right lobe of the liver with an SUV of 6.52 and an 11 cm mass in the left lobe of the liver with an SUV of 6.38.  There is a 1.5 cm lesion in the inferior right lobe of the liver with an SUV of 4.39.  There was no other evidence for metastatic disease.  There is no lymphadenopathy in the neck chest abdomen pelvis or retroperitoneum.  Multinodular goiter with thyromegaly was visualized.  No bowel obstruction was visualized.  An umbilical hernia and a left inguinal hernia were visualized.  There is some degenerative disc disease in the lumbar spine.     He saw an oncologist in [place].  He has family that lives here in [place] and he believes that he wants to be treated here in [place].  A Mediport is scheduled in [place] on Friday.     His right upper quadrant abdominal pain is adequately controlled with oxycodone 5 mg tablets 1-2 every 6 hours as needed.  He does continue to have some sharp right lower chest pain with deep breaths.  He has nausea that is well controlled with Zofran and Phenergan.  He is having bowel movements with a bowel regimen.  He has lost about 25 pounds over the past month or so.    He was initially seen in consultation on 10/22/2019.  He wanted to see a surgical oncologist and was referred to Dr. Painting who agreed with initiating therapy with chemotherapy first with surgical resection possible at a later date.  He was referred for colonoscopy and this was done at the [facility].    He initiated palliative FOLFIRI on 11/4/2019.    Cycle #2 initiated on 11/18/2019.    ALLERGIES:  No Known Allergies    MEDICATIONS:  The medication list has been reviewed with the patient by the medical assistant, and the list has been updated in the electronic medical record, which I reviewed.  Medication dosages and frequencies were confirmed to be accurate.    REVIEW OF SYSTEMS:  PAIN:  See Vital Signs below.  GENERAL:  No fevers, chills, night sweats,  8 pound weight loss over the last week.  Fatigue.  SKIN:  No rashes or non-healing lesions.  HEME/LYMPH:  No abnormal bleeding.  No palpable lymphadenopathy.  EYES:  No vision changes or diplopia.  ENT:  No sore throat or difficulty swallowing.  RESPIRATORY:  No cough, shortness of breath, hemoptysis, or wheezing.  CARDIOVASCULAR:  No chest pain, palpitations, orthopnea, or dyspnea on exertion.  GASTROINTESTINAL: Intermittent abdominal pain.  Overall improved.  Having bowel movements. See HPI, stable   GENITOURINARY:  No dysuria or hematuria.  MUSCULOSKELETAL:  No joint pain, swelling, or erythema.  NEUROLOGIC:  No dizziness, loss of consciousness, or seizures.  PSYCHIATRIC:  No depression, anxiety, or mood changes.    There were no vitals filed for this visit.    PHYSICAL EXAMINATION:  GENERAL:  Well-developed well-nourished male; awake, alert and oriented, in no acute distress.  SKIN:  Warm and dry, without rashes, purpura, or petechiae.  HEAD:  Normocephalic, atraumatic.  EARS:  Hearing intact.  NOSE:  Septum midline.  No excoriations or nasal discharge.  MOUTH:  No stomatitis or ulcers.  Lips are normal.  THROAT:  Oropharynx without lesions or exudates.  LYMPHATICS:  No cervical, supraclavicular, axillary, or inguinal lymphadenopathy.  CHEST:  Lungs are clear to auscultation bilaterally.  No wheezes, rales, or rhonchi.  Left subclavian Mediport which is benign in appearance.  HEART:  Regular rate; normal rhythm.  No murmurs, gallops or rubs.  ABDOMEN:  Soft, no tenderness to palpation.  Non-distended.  Normal active bowel sounds.  No organomegaly.  EXTREMITIES:  No clubbing, cyanosis, or edema.  NEUROLOGICAL:  No focal neurologic deficits.  Exam unchanged from previous except as updated    DIAGNOSTIC DATA:  Results for orders placed or performed in visit on 11/18/19   Comprehensive metabolic panel   Result Value Ref Range    Glucose 163 (H) 74 - 124 mg/dL    BUN 12 6 - 20 mg/dL    Creatinine 0.87 0.70 - 1.30  mg/dL    Sodium 137 134 - 145 mmol/L    Potassium 3.8 3.5 - 4.7 mmol/L    Chloride 100 98 - 107 mmol/L    CO2 23.4 22.0 - 29.0 mmol/L    Calcium 9.1 8.5 - 10.2 mg/dL    Total Protein 7.7 6.3 - 8.0 g/dL    Albumin 3.90 3.50 - 5.20 g/dL    ALT (SGPT) 32 0 - 41 U/L    AST (SGOT) 49 (H) 0 - 40 U/L    Alkaline Phosphatase 166 (H) 38 - 116 U/L    Total Bilirubin 1.1 0.2 - 1.2 mg/dL    eGFR Non African Amer 93 >60 mL/min/1.73    eGFR  African Amer 113 >60 mL/min/1.73    Globulin 3.8 (H) 1.8 - 3.5 gm/dL    A/G Ratio 1.0 (L) 1.1 - 2.4 g/dL    BUN/Creatinine Ratio 13.8 7.3 - 30.0    Anion Gap 13.6 5.0 - 15.0 mmol/L       IMAGING: None reviewed today    ASSESSMENT:  This is a 49 y.o. male with:  1.  Metastatic colon cancer: Extensive metastatic disease in the liver at diagnosis.  No other metastatic disease.  There is a 2.7 cm hypermetabolic lesion in the sigmoid colon on his PET imaging which is likely the primary tumor.  He did have a colonoscopy at the [facility] recently and we are working to get these results.  Mismatch repair intact.  K-lynnette mutation positive so he is not a candidate for Erbitux/vectibix.  BRAF mutation negative.  NRAS mutation negative.  HER-2 and M ET negative by FISH.  TRK negative.  He was referred to Dr. Painting to discuss surgical options.  We have elected to proceed with chemotherapy followed by repeat imaging and then he will be reassessed to see if he is a surgical candidate or if there are any other local therapy options with respect to the liver.  We have elected for palliative therapy with FOLFIRI.  We will consider adding Avastin depending on the surgical plan and depending on his response to therapy.      He initiated therapy on 11/4/2019.    He returns today for consideration of cycle #3 of FOLFIRI.  As noted above, the patient did have a mild reaction  with cycle #2 which was quickly relieved with IV steroids and antiemetic.  We will add 50 mg of Benadryl to today's  treatment and all subsequent cycles.  He is generally tolerating treatment well with exception of fatigue.  He has lost 8 pounds over the last week.  We will continue to monitor this very closely as the patient reports he is eating sufficiently.  His ANC is slightly low, though after review with Dr. Georges; we will proceed today as scheduled.      2.  Cancer related pain: He was having worsening pain on oxycodone and therefore this was switched to immediate release morphine.His pain has been reasonably well controlled and he is only required a few doses of the MSIR    3.  Chemotherapy-induced neutropenia.  As noted above, ANC 0.94 today.  We will proceed as scheduled without delay or reduction.  I have reviewed neutropenic precautions with the patient    PLAN:  1.  Cycle #3 of therapy with FOLFIRI today at the same doses.  We will add 50 mg of IV Benadryl to the patient's treatments moving forward   2.  He will be seen by genetic counseling on 12/3/2019  3.  Continue morphine immediate release 15 mg as needed for pain  4.  Neutropenic precautions reviewed with the patient today  5. MD visit in 2 weeks in conjunction with cycle #4 of therapy.   CT imaging of the chest abdomen and pelvis in 3 weeks.  Nurse practitioner visit in 4 weeks to review imaging although Dr. Georges will have reviewed imaging prior to that.   5. MD follow up in 6 weeks in conjunction with cycle # 6  6.  I have asked the patient to call our office prior to the next office visit with any new issues or concerns.  He has verbalized understanding.    -All the above reviewed with Dr. Georges and he is in agreement.  Additionally, we did discuss neutropenic precautions today

## 2019-12-02 ENCOUNTER — OFFICE VISIT (OUTPATIENT)
Dept: ONCOLOGY | Facility: CLINIC | Age: 49
End: 2019-12-02

## 2019-12-02 ENCOUNTER — LAB (OUTPATIENT)
Dept: LAB | Facility: HOSPITAL | Age: 49
End: 2019-12-02

## 2019-12-02 ENCOUNTER — INFUSION (OUTPATIENT)
Dept: ONCOLOGY | Facility: HOSPITAL | Age: 49
End: 2019-12-02

## 2019-12-02 VITALS
SYSTOLIC BLOOD PRESSURE: 116 MMHG | RESPIRATION RATE: 16 BRPM | DIASTOLIC BLOOD PRESSURE: 78 MMHG | OXYGEN SATURATION: 98 % | HEART RATE: 101 BPM | TEMPERATURE: 98 F | BODY MASS INDEX: 30.46 KG/M2 | HEIGHT: 72 IN | WEIGHT: 224.9 LBS

## 2019-12-02 VITALS — SYSTOLIC BLOOD PRESSURE: 130 MMHG | DIASTOLIC BLOOD PRESSURE: 72 MMHG | HEART RATE: 77 BPM

## 2019-12-02 DIAGNOSIS — C18.7 PRIMARY MALIGNANT NEOPLASM OF SIGMOID COLON (HCC): ICD-10-CM

## 2019-12-02 DIAGNOSIS — D70.1 CHEMOTHERAPY INDUCED NEUTROPENIA (HCC): ICD-10-CM

## 2019-12-02 DIAGNOSIS — T45.1X5A CHEMOTHERAPY INDUCED NEUTROPENIA (HCC): ICD-10-CM

## 2019-12-02 DIAGNOSIS — C18.7 PRIMARY MALIGNANT NEOPLASM OF SIGMOID COLON (HCC): Primary | ICD-10-CM

## 2019-12-02 LAB
ALBUMIN SERPL-MCNC: 3.9 G/DL (ref 3.5–5.2)
ALBUMIN/GLOB SERPL: 1.1 G/DL (ref 1.1–2.4)
ALP SERPL-CCNC: 149 U/L (ref 38–116)
ALT SERPL W P-5'-P-CCNC: 22 U/L (ref 0–41)
ANION GAP SERPL CALCULATED.3IONS-SCNC: 11.9 MMOL/L (ref 5–15)
AST SERPL-CCNC: 30 U/L (ref 0–40)
BASOPHILS # BLD AUTO: 0.07 10*3/MM3 (ref 0–0.2)
BASOPHILS NFR BLD AUTO: 2 % (ref 0–1.5)
BILIRUB SERPL-MCNC: 0.8 MG/DL (ref 0.2–1.2)
BUN BLD-MCNC: 9 MG/DL (ref 6–20)
BUN/CREAT SERPL: 10.1 (ref 7.3–30)
CALCIUM SPEC-SCNC: 9.1 MG/DL (ref 8.5–10.2)
CHLORIDE SERPL-SCNC: 102 MMOL/L (ref 98–107)
CO2 SERPL-SCNC: 24.1 MMOL/L (ref 22–29)
CREAT BLD-MCNC: 0.89 MG/DL (ref 0.7–1.3)
DEPRECATED RDW RBC AUTO: 43.3 FL (ref 37–54)
EOSINOPHIL # BLD AUTO: 0.22 10*3/MM3 (ref 0–0.4)
EOSINOPHIL NFR BLD AUTO: 6.3 % (ref 0.3–6.2)
ERYTHROCYTE [DISTWIDTH] IN BLOOD BY AUTOMATED COUNT: 16.2 % (ref 12.3–15.4)
GFR SERPL CREATININE-BSD FRML MDRD: 110 ML/MIN/1.73
GFR SERPL CREATININE-BSD FRML MDRD: 91 ML/MIN/1.73
GLOBULIN UR ELPH-MCNC: 3.5 GM/DL (ref 1.8–3.5)
GLUCOSE BLD-MCNC: 132 MG/DL (ref 74–124)
HCT VFR BLD AUTO: 42.8 % (ref 37.5–51)
HGB BLD-MCNC: 14.3 G/DL (ref 13–17.7)
IMM GRANULOCYTES # BLD AUTO: 0.01 10*3/MM3 (ref 0–0.05)
IMM GRANULOCYTES NFR BLD AUTO: 0.3 % (ref 0–0.5)
LYMPHOCYTES # BLD AUTO: 1.87 10*3/MM3 (ref 0.7–3.1)
LYMPHOCYTES NFR BLD AUTO: 53.9 % (ref 19.6–45.3)
MCH RBC QN AUTO: 25.7 PG (ref 26.6–33)
MCHC RBC AUTO-ENTMCNC: 33.4 G/DL (ref 31.5–35.7)
MCV RBC AUTO: 77 FL (ref 79–97)
MONOCYTES # BLD AUTO: 0.36 10*3/MM3 (ref 0.1–0.9)
MONOCYTES NFR BLD AUTO: 10.4 % (ref 5–12)
NEUTROPHILS # BLD AUTO: 0.94 10*3/MM3 (ref 1.7–7)
NEUTROPHILS NFR BLD AUTO: 27.1 % (ref 42.7–76)
NRBC BLD AUTO-RTO: 0 /100 WBC (ref 0–0.2)
PLATELET # BLD AUTO: 229 10*3/MM3 (ref 140–450)
PMV BLD AUTO: 9.4 FL (ref 6–12)
POTASSIUM BLD-SCNC: 3.6 MMOL/L (ref 3.5–4.7)
PROT SERPL-MCNC: 7.4 G/DL (ref 6.3–8)
RBC # BLD AUTO: 5.56 10*6/MM3 (ref 4.14–5.8)
SODIUM BLD-SCNC: 138 MMOL/L (ref 134–145)
WBC NRBC COR # BLD: 3.47 10*3/MM3 (ref 3.4–10.8)

## 2019-12-02 PROCEDURE — 96411 CHEMO IV PUSH ADDL DRUG: CPT | Performed by: NURSE PRACTITIONER

## 2019-12-02 PROCEDURE — 25010000002 DIPHENHYDRAMINE 1 EACH BAG: Performed by: NURSE PRACTITIONER

## 2019-12-02 PROCEDURE — 25010000002 DEXAMETHASONE SODIUM PHOSPHATE 100 MG/10ML SOLUTION: Performed by: NURSE PRACTITIONER

## 2019-12-02 PROCEDURE — 96415 CHEMO IV INFUSION ADDL HR: CPT | Performed by: NURSE PRACTITIONER

## 2019-12-02 PROCEDURE — 36415 COLL VENOUS BLD VENIPUNCTURE: CPT

## 2019-12-02 PROCEDURE — 96368 THER/DIAG CONCURRENT INF: CPT

## 2019-12-02 PROCEDURE — 96413 CHEMO IV INFUSION 1 HR: CPT | Performed by: NURSE PRACTITIONER

## 2019-12-02 PROCEDURE — 25010000002 PALONOSETRON PER 25 MCG: Performed by: INTERNAL MEDICINE

## 2019-12-02 PROCEDURE — 25010000002 LEUCOVORIN CALCIUM PER 50 MG: Performed by: NURSE PRACTITIONER

## 2019-12-02 PROCEDURE — 85025 COMPLETE CBC W/AUTO DIFF WBC: CPT

## 2019-12-02 PROCEDURE — 96416 CHEMO PROLONG INFUSE W/PUMP: CPT | Performed by: NURSE PRACTITIONER

## 2019-12-02 PROCEDURE — 99213 OFFICE O/P EST LOW 20 MIN: CPT | Performed by: NURSE PRACTITIONER

## 2019-12-02 PROCEDURE — 80053 COMPREHEN METABOLIC PANEL: CPT | Performed by: NURSE PRACTITIONER

## 2019-12-02 PROCEDURE — 96375 TX/PRO/DX INJ NEW DRUG ADDON: CPT | Performed by: NURSE PRACTITIONER

## 2019-12-02 PROCEDURE — 25010000002 IRINOTECAN PER 20 MG: Performed by: NURSE PRACTITIONER

## 2019-12-02 PROCEDURE — 25010000002 FLUOROURACIL PER 500 MG: Performed by: NURSE PRACTITIONER

## 2019-12-02 RX ORDER — SODIUM CHLORIDE 9 MG/ML
250 INJECTION, SOLUTION INTRAVENOUS ONCE
Status: COMPLETED | OUTPATIENT
Start: 2019-12-02 | End: 2019-12-02

## 2019-12-02 RX ORDER — ATROPINE SULFATE 0.4 MG/ML
0.25 AMPUL (ML) INJECTION
Status: DISCONTINUED | OUTPATIENT
Start: 2019-12-02 | End: 2019-12-02 | Stop reason: HOSPADM

## 2019-12-02 RX ORDER — FLUOROURACIL 50 MG/ML
400 INJECTION, SOLUTION INTRAVENOUS ONCE
Status: COMPLETED | OUTPATIENT
Start: 2019-12-02 | End: 2019-12-02

## 2019-12-02 RX ORDER — PALONOSETRON 0.05 MG/ML
0.25 INJECTION, SOLUTION INTRAVENOUS ONCE
Status: COMPLETED | OUTPATIENT
Start: 2019-12-02 | End: 2019-12-02

## 2019-12-02 RX ADMIN — FLUOROURACIL 920 MG: 50 INJECTION, SOLUTION INTRAVENOUS at 13:07

## 2019-12-02 RX ADMIN — PALONOSETRON 0.25 MG: 0.05 INJECTION, SOLUTION INTRAVENOUS at 10:33

## 2019-12-02 RX ADMIN — DEXAMETHASONE SODIUM PHOSPHATE 12 MG: 10 INJECTION, SOLUTION INTRAMUSCULAR; INTRAVENOUS at 10:51

## 2019-12-02 RX ADMIN — IRINOTECAN HYDROCHLORIDE 415 MG: 20 INJECTION, SOLUTION INTRAVENOUS at 11:12

## 2019-12-02 RX ADMIN — SODIUM CHLORIDE 250 ML: 9 INJECTION, SOLUTION INTRAVENOUS at 10:33

## 2019-12-02 RX ADMIN — DIPHENHYDRAMINE HYDROCHLORIDE 50 MG: 50 INJECTION INTRAMUSCULAR; INTRAVENOUS at 10:33

## 2019-12-02 RX ADMIN — ATROPINE SULFATE 0.25 MG: 0.4 INJECTION INTRAMUSCULAR; INTRAVENOUS; SUBCUTANEOUS at 11:08

## 2019-12-02 RX ADMIN — SODIUM CHLORIDE 920 MG: 900 INJECTION, SOLUTION INTRAVENOUS at 11:12

## 2019-12-02 RX ADMIN — FLUOROURACIL 5520 MG: 50 INJECTION, SOLUTION INTRAVENOUS at 13:08

## 2019-12-02 NOTE — PROGRESS NOTES
Patient seen JALIL Huston today. Per JALIL Huston okay for treatment. Labs reviewed ANC 0.94. Can release treatment on CBC per JALIL Huston.

## 2019-12-03 ENCOUNTER — CLINICAL SUPPORT (OUTPATIENT)
Dept: OTHER | Facility: HOSPITAL | Age: 49
End: 2019-12-03

## 2019-12-03 DIAGNOSIS — C18.7 PRIMARY MALIGNANT NEOPLASM OF SIGMOID COLON (HCC): Primary | ICD-10-CM

## 2019-12-03 PROCEDURE — G0463 HOSPITAL OUTPT CLINIC VISIT: HCPCS

## 2019-12-03 NOTE — PATIENT INSTRUCTIONS
Pt seen by Dr. Barnhart for genetic counseling and testing. Lab drawn with 21g butterfly needle in Left AC x 1 attempt. Sent to TDX. Pt informed she would receive a phone call when test results.

## 2019-12-04 ENCOUNTER — INFUSION (OUTPATIENT)
Dept: ONCOLOGY | Facility: HOSPITAL | Age: 49
End: 2019-12-04

## 2019-12-04 DIAGNOSIS — Z45.2 ENCOUNTER FOR FITTING AND ADJUSTMENT OF VASCULAR CATHETER: ICD-10-CM

## 2019-12-04 DIAGNOSIS — C18.7 PRIMARY MALIGNANT NEOPLASM OF SIGMOID COLON (HCC): Primary | ICD-10-CM

## 2019-12-04 RX ORDER — SODIUM CHLORIDE 0.9 % (FLUSH) 0.9 %
10 SYRINGE (ML) INJECTION AS NEEDED
Status: DISCONTINUED | OUTPATIENT
Start: 2019-12-04 | End: 2019-12-04 | Stop reason: HOSPADM

## 2019-12-04 RX ORDER — SODIUM CHLORIDE 0.9 % (FLUSH) 0.9 %
10 SYRINGE (ML) INJECTION AS NEEDED
Status: CANCELLED | OUTPATIENT
Start: 2019-12-04

## 2019-12-04 RX ADMIN — Medication 500 UNITS: at 12:15

## 2019-12-04 RX ADMIN — SODIUM CHLORIDE, PRESERVATIVE FREE 10 ML: 5 INJECTION INTRAVENOUS at 12:14

## 2019-12-16 ENCOUNTER — INFUSION (OUTPATIENT)
Dept: ONCOLOGY | Facility: HOSPITAL | Age: 49
End: 2019-12-16

## 2019-12-16 ENCOUNTER — APPOINTMENT (OUTPATIENT)
Dept: LAB | Facility: HOSPITAL | Age: 49
End: 2019-12-16

## 2019-12-16 ENCOUNTER — OFFICE VISIT (OUTPATIENT)
Dept: ONCOLOGY | Facility: CLINIC | Age: 49
End: 2019-12-16

## 2019-12-16 VITALS
OXYGEN SATURATION: 97 % | RESPIRATION RATE: 16 BRPM | WEIGHT: 226.6 LBS | HEART RATE: 115 BPM | TEMPERATURE: 98.2 F | DIASTOLIC BLOOD PRESSURE: 86 MMHG | BODY MASS INDEX: 30.69 KG/M2 | SYSTOLIC BLOOD PRESSURE: 123 MMHG | HEIGHT: 72 IN

## 2019-12-16 VITALS — HEART RATE: 104 BPM

## 2019-12-16 DIAGNOSIS — C18.7 PRIMARY MALIGNANT NEOPLASM OF SIGMOID COLON (HCC): Primary | ICD-10-CM

## 2019-12-16 DIAGNOSIS — C18.7 PRIMARY MALIGNANT NEOPLASM OF SIGMOID COLON (HCC): ICD-10-CM

## 2019-12-16 LAB
ALBUMIN SERPL-MCNC: 3.8 G/DL (ref 3.5–5.2)
ALBUMIN/GLOB SERPL: 1.1 G/DL (ref 1.1–2.4)
ALP SERPL-CCNC: 146 U/L (ref 38–116)
ALT SERPL W P-5'-P-CCNC: 21 U/L (ref 0–41)
ANION GAP SERPL CALCULATED.3IONS-SCNC: 13.8 MMOL/L (ref 5–15)
AST SERPL-CCNC: 57 U/L (ref 0–40)
BASOPHILS # BLD AUTO: 0.06 10*3/MM3 (ref 0–0.2)
BASOPHILS NFR BLD AUTO: 1.5 % (ref 0–1.5)
BILIRUB SERPL-MCNC: 0.8 MG/DL (ref 0.2–1.2)
BUN BLD-MCNC: 8 MG/DL (ref 6–20)
BUN/CREAT SERPL: 9.3 (ref 7.3–30)
CALCIUM SPEC-SCNC: 8.8 MG/DL (ref 8.5–10.2)
CHLORIDE SERPL-SCNC: 102 MMOL/L (ref 98–107)
CO2 SERPL-SCNC: 23.2 MMOL/L (ref 22–29)
CREAT BLD-MCNC: 0.86 MG/DL (ref 0.7–1.3)
DEPRECATED RDW RBC AUTO: 48.9 FL (ref 37–54)
EOSINOPHIL # BLD AUTO: 0.23 10*3/MM3 (ref 0–0.4)
EOSINOPHIL NFR BLD AUTO: 5.9 % (ref 0.3–6.2)
ERYTHROCYTE [DISTWIDTH] IN BLOOD BY AUTOMATED COUNT: 17.8 % (ref 12.3–15.4)
GFR SERPL CREATININE-BSD FRML MDRD: 115 ML/MIN/1.73
GFR SERPL CREATININE-BSD FRML MDRD: 95 ML/MIN/1.73
GLOBULIN UR ELPH-MCNC: 3.6 GM/DL (ref 1.8–3.5)
GLUCOSE BLD-MCNC: 167 MG/DL (ref 74–124)
HCT VFR BLD AUTO: 41.1 % (ref 37.5–51)
HGB BLD-MCNC: 13.3 G/DL (ref 13–17.7)
IMM GRANULOCYTES # BLD AUTO: 0 10*3/MM3 (ref 0–0.05)
IMM GRANULOCYTES NFR BLD AUTO: 0 % (ref 0–0.5)
LYMPHOCYTES # BLD AUTO: 1.97 10*3/MM3 (ref 0.7–3.1)
LYMPHOCYTES NFR BLD AUTO: 50.4 % (ref 19.6–45.3)
MCH RBC QN AUTO: 26 PG (ref 26.6–33)
MCHC RBC AUTO-ENTMCNC: 32.4 G/DL (ref 31.5–35.7)
MCV RBC AUTO: 80.4 FL (ref 79–97)
MONOCYTES # BLD AUTO: 0.56 10*3/MM3 (ref 0.1–0.9)
MONOCYTES NFR BLD AUTO: 14.3 % (ref 5–12)
NEUTROPHILS # BLD AUTO: 1.09 10*3/MM3 (ref 1.7–7)
NEUTROPHILS NFR BLD AUTO: 27.9 % (ref 42.7–76)
NRBC BLD AUTO-RTO: 0 /100 WBC (ref 0–0.2)
PLATELET # BLD AUTO: 224 10*3/MM3 (ref 140–450)
PMV BLD AUTO: 9.1 FL (ref 6–12)
POTASSIUM BLD-SCNC: 3.7 MMOL/L (ref 3.5–4.7)
PROT SERPL-MCNC: 7.4 G/DL (ref 6.3–8)
RBC # BLD AUTO: 5.11 10*6/MM3 (ref 4.14–5.8)
SODIUM BLD-SCNC: 139 MMOL/L (ref 134–145)
WBC NRBC COR # BLD: 3.91 10*3/MM3 (ref 3.4–10.8)

## 2019-12-16 PROCEDURE — 96411 CHEMO IV PUSH ADDL DRUG: CPT | Performed by: INTERNAL MEDICINE

## 2019-12-16 PROCEDURE — 25010000002 PALONOSETRON PER 25 MCG: Performed by: INTERNAL MEDICINE

## 2019-12-16 PROCEDURE — 25010000002 DIPHENHYDRAMINE 1 EACH BAG: Performed by: INTERNAL MEDICINE

## 2019-12-16 PROCEDURE — 85025 COMPLETE CBC W/AUTO DIFF WBC: CPT

## 2019-12-16 PROCEDURE — 96413 CHEMO IV INFUSION 1 HR: CPT | Performed by: INTERNAL MEDICINE

## 2019-12-16 PROCEDURE — 25010000002 FLUOROURACIL PER 500 MG: Performed by: INTERNAL MEDICINE

## 2019-12-16 PROCEDURE — 96368 THER/DIAG CONCURRENT INF: CPT | Performed by: INTERNAL MEDICINE

## 2019-12-16 PROCEDURE — 25010000002 LEUCOVORIN CALCIUM PER 50 MG: Performed by: INTERNAL MEDICINE

## 2019-12-16 PROCEDURE — 96375 TX/PRO/DX INJ NEW DRUG ADDON: CPT | Performed by: INTERNAL MEDICINE

## 2019-12-16 PROCEDURE — 96416 CHEMO PROLONG INFUSE W/PUMP: CPT | Performed by: INTERNAL MEDICINE

## 2019-12-16 PROCEDURE — 25010000002 ATROPINE PER 0.01 MG: Performed by: INTERNAL MEDICINE

## 2019-12-16 PROCEDURE — 96415 CHEMO IV INFUSION ADDL HR: CPT | Performed by: INTERNAL MEDICINE

## 2019-12-16 PROCEDURE — 25010000002 DEXAMETHASONE SODIUM PHOSPHATE 100 MG/10ML SOLUTION: Performed by: INTERNAL MEDICINE

## 2019-12-16 PROCEDURE — 99215 OFFICE O/P EST HI 40 MIN: CPT | Performed by: INTERNAL MEDICINE

## 2019-12-16 PROCEDURE — 25010000002 IRINOTECAN PER 20 MG: Performed by: INTERNAL MEDICINE

## 2019-12-16 PROCEDURE — 80053 COMPREHEN METABOLIC PANEL: CPT

## 2019-12-16 RX ORDER — PALONOSETRON 0.05 MG/ML
0.25 INJECTION, SOLUTION INTRAVENOUS ONCE
Status: COMPLETED | OUTPATIENT
Start: 2019-12-16 | End: 2019-12-16

## 2019-12-16 RX ORDER — FLUOROURACIL 50 MG/ML
400 INJECTION, SOLUTION INTRAVENOUS ONCE
Status: CANCELLED | OUTPATIENT
Start: 2019-12-30

## 2019-12-16 RX ORDER — PALONOSETRON 0.05 MG/ML
0.25 INJECTION, SOLUTION INTRAVENOUS ONCE
Status: CANCELLED | OUTPATIENT
Start: 2019-12-30

## 2019-12-16 RX ORDER — SODIUM CHLORIDE 9 MG/ML
250 INJECTION, SOLUTION INTRAVENOUS ONCE
Status: COMPLETED | OUTPATIENT
Start: 2019-12-16 | End: 2019-12-16

## 2019-12-16 RX ORDER — ATROPINE SULFATE 1 MG/ML
0.25 INJECTION, SOLUTION INTRAMUSCULAR; INTRAVENOUS; SUBCUTANEOUS
Status: CANCELLED | OUTPATIENT
Start: 2019-12-30

## 2019-12-16 RX ORDER — SODIUM CHLORIDE 9 MG/ML
250 INJECTION, SOLUTION INTRAVENOUS ONCE
Status: CANCELLED | OUTPATIENT
Start: 2019-12-30

## 2019-12-16 RX ORDER — ATROPINE SULFATE 0.4 MG/ML
0.25 AMPUL (ML) INJECTION
Status: DISCONTINUED | OUTPATIENT
Start: 2019-12-16 | End: 2019-12-16 | Stop reason: HOSPADM

## 2019-12-16 RX ORDER — FLUOROURACIL 50 MG/ML
400 INJECTION, SOLUTION INTRAVENOUS ONCE
Status: COMPLETED | OUTPATIENT
Start: 2019-12-16 | End: 2019-12-16

## 2019-12-16 RX ADMIN — IRINOTECAN HYDROCHLORIDE 415 MG: 20 INJECTION, SOLUTION INTRAVENOUS at 09:51

## 2019-12-16 RX ADMIN — DEXAMETHASONE SODIUM PHOSPHATE 12 MG: 10 INJECTION, SOLUTION INTRAMUSCULAR; INTRAVENOUS at 09:26

## 2019-12-16 RX ADMIN — DIPHENHYDRAMINE HYDROCHLORIDE 50 MG: 50 INJECTION INTRAMUSCULAR; INTRAVENOUS at 09:02

## 2019-12-16 RX ADMIN — FLUOROURACIL 920 MG: 50 INJECTION, SOLUTION INTRAVENOUS at 11:33

## 2019-12-16 RX ADMIN — PALONOSETRON 0.25 MG: 0.05 INJECTION, SOLUTION INTRAVENOUS at 09:00

## 2019-12-16 RX ADMIN — SODIUM CHLORIDE 250 ML: 9 INJECTION, SOLUTION INTRAVENOUS at 09:01

## 2019-12-16 RX ADMIN — FLUOROURACIL 5520 MG: 50 INJECTION, SOLUTION INTRAVENOUS at 11:33

## 2019-12-16 RX ADMIN — SODIUM CHLORIDE 920 MG: 900 INJECTION, SOLUTION INTRAVENOUS at 09:51

## 2019-12-16 RX ADMIN — ATROPINE SULFATE 0.25 MG: 0.4 INJECTION, SOLUTION INTRAMUSCULAR; INTRAVENOUS; SUBCUTANEOUS at 09:47

## 2019-12-16 NOTE — PROGRESS NOTES
[facility] GROUP OUTPATIENT FOLLOW UP CLINIC VISIT    REASON FOR FOLLOW-UP:    1.  Metastatic colon cancer with large liver metastases  2.  Palliative therapy with FOLFIRI initiated on 11/4/2019.    HISTORY OF PRESENT ILLNESS:  Silver Sloan is a 49 y.o. male who returns today for follow up of the above issue.  He returns today for consideration of cycle #4 of FOLFIRI.      He tolerates therapy well.  He reports fatigue the first week after therapy it does improve.  Abdominal pain is under good control at this point and he really has not needed any pain medication.  He reports a bleeding hemorrhoid but denies any other GI bleeding.  No nausea vomiting or diarrhea.  No mucositis.  He notes some mild skin changes and has a small area on his anterior chest that is a little darker but there is no pruritus or erythema.  His weight is up a couple of pounds today.  He has tachycardic but asymptomatic with respect to this.    2 weeks ago he was moderately neutropenic.  We proceeded with treatment regardless.  Today his ANC has improved.    ONCOLOGIC HISTORY:  He lives in [facility].  He presented to [facility] for further care.  CT imaging indicated liver lesions.  He had a CT-guided liver biopsy performed on 10/14/2019 with pathology consistent with adenocarcinoma metastatic from colon.  He had a PET scan performed on 10/18/2019 which revealed a 2.7 cm mass in the sigmoid colon thought to be the primary malignancy.  The liver is enlarged measuring 22 cm.  There is a 14.3 cm mass in the right lobe of the liver with an SUV of 6.52 and an 11 cm mass in the left lobe of the liver with an SUV of 6.38.  There is a 1.5 cm lesion in the inferior right lobe of the liver with an SUV of 4.39.  There was no other evidence for metastatic disease.  There is no lymphadenopathy in the neck chest abdomen pelvis or retroperitoneum.  Multinodular goiter with thyromegaly was visualized.  No bowel  obstruction was visualized.  An umbilical hernia and a left inguinal hernia were visualized.  There is some degenerative disc disease in the lumbar spine.     He saw an oncologist in [place].  He has family that lives here in [place] and he believes that he wants to be treated here in [place].  A Mediport is scheduled in [place] on Friday.     His right upper quadrant abdominal pain is adequately controlled with oxycodone 5 mg tablets 1-2 every 6 hours as needed.  He does continue to have some sharp right lower chest pain with deep breaths.  He has nausea that is well controlled with Zofran and Phenergan.  He is having bowel movements with a bowel regimen.  He has lost about 25 pounds over the past month or so.    He was initially seen in consultation on 10/22/2019.  He wanted to see a surgical oncologist and was referred to Dr. Painting who agreed with initiating therapy with chemotherapy first with surgical resection possible at a later date.  He was referred for colonoscopy and this was done at the [facility].    He initiated palliative FOLFIRI on 11/4/2019.    Cycle #2 initiated on 11/18/2019.  Mild reaction.  We continue 50 mg of Benadryl with each cycle.    ALLERGIES:  No Known Allergies    MEDICATIONS:  The medication list has been reviewed with the patient by the medical assistant, and the list has been updated in the electronic medical record, which I reviewed.  Medication dosages and frequencies were confirmed to be accurate.    REVIEW OF SYSTEMS:  PAIN:  See Vital Signs below.  GENERAL:  No fevers, chills, night sweats.  Fatigue.  Weight has improved a little bit.  SKIN: Mild darkening of the skin and a small area on the center anterior chest  HEME/LYMPH:  No abnormal bleeding.  No palpable lymphadenopathy.  EYES:  No vision changes or diplopia.  ENT:  No sore throat or difficulty swallowing.  RESPIRATORY:  No cough, shortness of breath, hemoptysis, or  "wheezing.  CARDIOVASCULAR:  No chest pain, palpitations, orthopnea, or dyspnea on exertion.  GASTROINTESTINAL: Intermittent abdominal pain.  Overall improved.  No significant bowel changes  GENITOURINARY:  No dysuria or hematuria.  MUSCULOSKELETAL:  No joint pain, swelling, or erythema.  NEUROLOGIC:  No dizziness, loss of consciousness, or seizures.  PSYCHIATRIC:  No depression, anxiety, or mood changes.    Vitals:    12/16/19 0800   BP: 123/86   Pulse: 115   Resp: 16   Temp: 98.2 °F (36.8 °C)   TempSrc: Oral   SpO2: 97%   Weight: 103 kg (226 lb 9.6 oz)   Height: 183 cm (72.05\")   PainSc: 0-No pain  Comment: colon cancer       PHYSICAL EXAMINATION:  GENERAL:  Well-developed well-nourished male; awake, alert and oriented, in no acute distress.  SKIN:  Warm and dry, without rashes, purpura, or petechiae.  HEAD:  Normocephalic, atraumatic.  EARS:  Hearing intact.  NOSE:  Septum midline.  No excoriations or nasal discharge.  MOUTH:  No stomatitis or ulcers.  Lips are normal.  THROAT:  Oropharynx without lesions or exudates.  LYMPHATICS:  No cervical, supraclavicular, axillary, or inguinal lymphadenopathy.  CHEST:  Lungs are clear to auscultation bilaterally.  No wheezes, rales, or rhonchi.  Left subclavian Mediport which is benign in appearance.  HEART:  Regular rate; normal rhythm.  No murmurs, gallops or rubs.  ABDOMEN:  Soft, no tenderness to palpation.  Non-distended.  Normal active bowel sounds.  No organomegaly.  EXTREMITIES:  No clubbing, cyanosis, or edema.  NEUROLOGICAL:  No focal neurologic deficits.  Exam unchanged from prior except as updated    DIAGNOSTIC DATA:  Results for orders placed or performed in visit on 12/16/19   CBC Auto Differential   Result Value Ref Range    WBC 3.91 3.40 - 10.80 10*3/mm3    RBC 5.11 4.14 - 5.80 10*6/mm3    Hemoglobin 13.3 13.0 - 17.7 g/dL    Hematocrit 41.1 37.5 - 51.0 %    MCV 80.4 79.0 - 97.0 fL    MCH 26.0 (L) 26.6 - 33.0 pg    MCHC 32.4 31.5 - 35.7 g/dL    RDW 17.8 (H) " 12.3 - 15.4 %    RDW-SD 48.9 37.0 - 54.0 fl    MPV 9.1 6.0 - 12.0 fL    Platelets 224 140 - 450 10*3/mm3    Neutrophil % 27.9 (L) 42.7 - 76.0 %    Lymphocyte % 50.4 (H) 19.6 - 45.3 %    Monocyte % 14.3 (H) 5.0 - 12.0 %    Eosinophil % 5.9 0.3 - 6.2 %    Basophil % 1.5 0.0 - 1.5 %    Immature Grans % 0.0 0.0 - 0.5 %    Neutrophils, Absolute 1.09 (L) 1.70 - 7.00 10*3/mm3    Lymphocytes, Absolute 1.97 0.70 - 3.10 10*3/mm3    Monocytes, Absolute 0.56 0.10 - 0.90 10*3/mm3    Eosinophils, Absolute 0.23 0.00 - 0.40 10*3/mm3    Basophils, Absolute 0.06 0.00 - 0.20 10*3/mm3    Immature Grans, Absolute 0.00 0.00 - 0.05 10*3/mm3    nRBC 0.0 0.0 - 0.2 /100 WBC       IMAGING: None reviewed today    ASSESSMENT:  This is a 49 y.o. male with:  1.  Metastatic colon cancer: Extensive metastatic disease in the liver at diagnosis.  No other metastatic disease.  There is a 2.7 cm hypermetabolic lesion in the sigmoid colon on his PET imaging which is likely the primary tumor.  He did have a colonoscopy at the [facility] recently and we are working to get these results.  Mismatch repair intact.  K-lynnette mutation positive so he is not a candidate for Erbitux/vectibix.  BRAF mutation negative.  NRAS mutation negative.  HER-2 and M ET negative by FISH.  TRK negative.  He was referred to Dr. Painting to discuss surgical options.  We have elected to proceed with chemotherapy followed by repeat imaging and then he will be reassessed to see if he is a surgical candidate or if there are any other local therapy options with respect to the liver.  We have elected for palliative therapy with FOLFIRI.  We will consider adding Avastin depending on the surgical plan and depending on his response to therapy.      He initiated therapy on 11/4/2019.    Proceed with cycle #4 today at the same doses.    CT imaging is scheduled for 12/23/2019.  We will see him back in the office after that for follow-up.  At that point we will discuss his  situation with Dr. Painting and make further plans.    2.  Cancer related pain: He was having worsening pain on oxycodone and therefore this was switched to immediate release morphine.His pain has been reasonably well controlled and he is only required a few doses of the MSIR    3.  Chemotherapy-induced neutropenia.  As noted above, ANC 0.94 with his last cycle.  We proceeded with treatment that day.  His ANC has improved today.  Proceed with treatment.    4.  Tachycardia: His rhythm is regular.  Continue to monitor.  He is asymptomatic.  New issue addressed today.    PLAN:  1.  Cycle #4 of therapy with FOLFIRI today at the same doses.  We will continue to add 50 mg of IV Benadryl to the patient's treatments moving forward   2.  He was seen by genetic counseling on 12/3/2019 and we await these results  3.  Continue morphine immediate release 15 mg as needed for pain  4.  Neutropenic precautions previously reviewed with the patient   5.  CT imaging is currently scheduled for 12/23/2019 and we will see him back in the office to follow-up these results after that.  He has treatment scheduled through 1/13/2020  6.  I have asked the patient to call our office with any new concerns.

## 2019-12-18 ENCOUNTER — INFUSION (OUTPATIENT)
Dept: ONCOLOGY | Facility: HOSPITAL | Age: 49
End: 2019-12-18

## 2019-12-18 DIAGNOSIS — C18.7 PRIMARY MALIGNANT NEOPLASM OF SIGMOID COLON (HCC): Primary | ICD-10-CM

## 2019-12-18 DIAGNOSIS — Z45.2 ENCOUNTER FOR FITTING AND ADJUSTMENT OF VASCULAR CATHETER: ICD-10-CM

## 2019-12-18 RX ORDER — SODIUM CHLORIDE 0.9 % (FLUSH) 0.9 %
10 SYRINGE (ML) INJECTION AS NEEDED
Status: CANCELLED | OUTPATIENT
Start: 2019-12-18

## 2019-12-18 RX ORDER — SODIUM CHLORIDE 0.9 % (FLUSH) 0.9 %
10 SYRINGE (ML) INJECTION AS NEEDED
Status: DISCONTINUED | OUTPATIENT
Start: 2019-12-18 | End: 2019-12-18 | Stop reason: HOSPADM

## 2019-12-18 RX ADMIN — Medication 500 UNITS: at 09:47

## 2019-12-18 RX ADMIN — SODIUM CHLORIDE, PRESERVATIVE FREE 10 ML: 5 INJECTION INTRAVENOUS at 09:47

## 2019-12-23 ENCOUNTER — INFUSION (OUTPATIENT)
Dept: ONCOLOGY | Facility: HOSPITAL | Age: 49
End: 2019-12-23

## 2019-12-23 ENCOUNTER — HOSPITAL ENCOUNTER (OUTPATIENT)
Dept: PET IMAGING | Facility: HOSPITAL | Age: 49
Discharge: HOME | End: 2019-12-23
Admitting: INTERNAL MEDICINE

## 2019-12-23 DIAGNOSIS — Z45.2 ENCOUNTER FOR FITTING AND ADJUSTMENT OF VASCULAR CATHETER: Primary | ICD-10-CM

## 2019-12-23 DIAGNOSIS — C18.7 PRIMARY MALIGNANT NEOPLASM OF SIGMOID COLON (HCC): ICD-10-CM

## 2019-12-23 LAB — CREAT BLDA-MCNC: 0.8 MG/DL (ref 0.6–1.3)

## 2019-12-23 PROCEDURE — 74177 CT ABD & PELVIS W/CONTRAST: CPT

## 2019-12-23 PROCEDURE — 36593 DECLOT VASCULAR DEVICE: CPT

## 2019-12-23 PROCEDURE — 25010000002 IOPAMIDOL 61 % SOLUTION: Performed by: INTERNAL MEDICINE

## 2019-12-23 PROCEDURE — 0 DIATRIZOATE MEGLUMINE & SODIUM PER 1 ML: Performed by: INTERNAL MEDICINE

## 2019-12-23 PROCEDURE — 82565 ASSAY OF CREATININE: CPT

## 2019-12-23 PROCEDURE — 25010000002 ALTEPLASE 2 MG RECONSTITUTED SOLUTION: Performed by: INTERNAL MEDICINE

## 2019-12-23 PROCEDURE — 71260 CT THORAX DX C+: CPT

## 2019-12-23 RX ORDER — SODIUM CHLORIDE 0.9 % (FLUSH) 0.9 %
10 SYRINGE (ML) INJECTION AS NEEDED
Status: CANCELLED | OUTPATIENT
Start: 2019-12-23

## 2019-12-23 RX ORDER — SODIUM CHLORIDE 0.9 % (FLUSH) 0.9 %
10 SYRINGE (ML) INJECTION AS NEEDED
Status: DISCONTINUED | OUTPATIENT
Start: 2019-12-23 | End: 2019-12-23 | Stop reason: HOSPADM

## 2019-12-23 RX ADMIN — IOPAMIDOL 85 ML: 612 INJECTION, SOLUTION INTRAVENOUS at 09:46

## 2019-12-23 RX ADMIN — ALTEPLASE: 2.2 INJECTION, POWDER, LYOPHILIZED, FOR SOLUTION INTRAVENOUS at 09:10

## 2019-12-23 RX ADMIN — DIATRIZOATE MEGLUMINE AND DIATRIZOATE SODIUM 30 ML: 660; 100 LIQUID ORAL; RECTAL at 08:46

## 2019-12-28 ENCOUNTER — TELEPHONE (OUTPATIENT)
Dept: ONCOLOGY | Facility: CLINIC | Age: 49
End: 2019-12-28

## 2019-12-28 NOTE — TELEPHONE ENCOUNTER
His CT scan results from 12/23/2019 show some evidence for progression and certainly no improvement.    He returns Monday with a nurse practitioner to consider his next cycle of chemotherapy.    I tried to reach him today by phone and left him a message.  I advised him to call us back through the answering service over the weekend so that we can talk.    My plan is to transition therapy to FOLFOX plus Avastin.  I will discuss with him when he calls back.      Message sent to our precertification team.

## 2019-12-29 ENCOUNTER — TELEPHONE (OUTPATIENT)
Dept: ONCOLOGY | Facility: CLINIC | Age: 49
End: 2019-12-29

## 2019-12-29 DIAGNOSIS — C18.7 PRIMARY MALIGNANT NEOPLASM OF SIGMOID COLON (HCC): ICD-10-CM

## 2019-12-29 RX ORDER — PALONOSETRON 0.05 MG/ML
0.25 INJECTION, SOLUTION INTRAVENOUS ONCE
Status: CANCELLED | OUTPATIENT
Start: 2020-01-06

## 2019-12-29 RX ORDER — SODIUM CHLORIDE 9 MG/ML
250 INJECTION, SOLUTION INTRAVENOUS ONCE
Status: CANCELLED | OUTPATIENT
Start: 2020-01-06

## 2019-12-29 RX ORDER — DIPHENHYDRAMINE HYDROCHLORIDE 50 MG/ML
50 INJECTION INTRAMUSCULAR; INTRAVENOUS AS NEEDED
Status: CANCELLED | OUTPATIENT
Start: 2020-01-06

## 2019-12-29 RX ORDER — FAMOTIDINE 10 MG/ML
20 INJECTION, SOLUTION INTRAVENOUS AS NEEDED
Status: CANCELLED | OUTPATIENT
Start: 2020-01-06

## 2019-12-29 RX ORDER — DEXTROSE MONOHYDRATE 50 MG/ML
250 INJECTION, SOLUTION INTRAVENOUS ONCE
Status: CANCELLED | OUTPATIENT
Start: 2020-01-06

## 2019-12-29 RX ORDER — FLUOROURACIL 50 MG/ML
400 INJECTION, SOLUTION INTRAVENOUS ONCE
Status: CANCELLED | OUTPATIENT
Start: 2020-01-06

## 2019-12-29 NOTE — TELEPHONE ENCOUNTER
Spoke with him on the phone today regarding the scan results and plan to transition therapy to FOLFOX plus Avastin.

## 2019-12-30 ENCOUNTER — APPOINTMENT (OUTPATIENT)
Dept: LAB | Facility: HOSPITAL | Age: 49
End: 2019-12-30

## 2019-12-30 ENCOUNTER — APPOINTMENT (OUTPATIENT)
Dept: ONCOLOGY | Facility: HOSPITAL | Age: 49
End: 2019-12-30

## 2019-12-30 ENCOUNTER — OFFICE VISIT (OUTPATIENT)
Dept: ONCOLOGY | Facility: CLINIC | Age: 49
End: 2019-12-30

## 2019-12-30 ENCOUNTER — APPOINTMENT (OUTPATIENT)
Dept: ONCOLOGY | Facility: CLINIC | Age: 49
End: 2019-12-30

## 2019-12-30 ENCOUNTER — LAB (OUTPATIENT)
Dept: LAB | Facility: HOSPITAL | Age: 49
End: 2019-12-30

## 2019-12-30 VITALS
HEIGHT: 72 IN | RESPIRATION RATE: 16 BRPM | TEMPERATURE: 97.9 F | HEART RATE: 102 BPM | WEIGHT: 226 LBS | DIASTOLIC BLOOD PRESSURE: 80 MMHG | SYSTOLIC BLOOD PRESSURE: 118 MMHG | OXYGEN SATURATION: 96 % | BODY MASS INDEX: 30.61 KG/M2

## 2019-12-30 DIAGNOSIS — C18.7 PRIMARY MALIGNANT NEOPLASM OF SIGMOID COLON (HCC): ICD-10-CM

## 2019-12-30 DIAGNOSIS — C18.7 PRIMARY MALIGNANT NEOPLASM OF SIGMOID COLON (HCC): Primary | ICD-10-CM

## 2019-12-30 LAB
BASOPHILS # BLD AUTO: 0.09 10*3/MM3 (ref 0–0.2)
BASOPHILS NFR BLD AUTO: 2.5 % (ref 0–1.5)
DEPRECATED RDW RBC AUTO: 51.1 FL (ref 37–54)
EOSINOPHIL # BLD AUTO: 0.28 10*3/MM3 (ref 0–0.4)
EOSINOPHIL NFR BLD AUTO: 7.9 % (ref 0.3–6.2)
ERYTHROCYTE [DISTWIDTH] IN BLOOD BY AUTOMATED COUNT: 18.7 % (ref 12.3–15.4)
HCT VFR BLD AUTO: 42.1 % (ref 37.5–51)
HGB BLD-MCNC: 13.9 G/DL (ref 13–17.7)
IMM GRANULOCYTES # BLD AUTO: 0.04 10*3/MM3 (ref 0–0.05)
IMM GRANULOCYTES NFR BLD AUTO: 1.1 % (ref 0–0.5)
LYMPHOCYTES # BLD AUTO: 1.77 10*3/MM3 (ref 0.7–3.1)
LYMPHOCYTES NFR BLD AUTO: 49.9 % (ref 19.6–45.3)
MCH RBC QN AUTO: 26.3 PG (ref 26.6–33)
MCHC RBC AUTO-ENTMCNC: 33 G/DL (ref 31.5–35.7)
MCV RBC AUTO: 79.7 FL (ref 79–97)
MONOCYTES # BLD AUTO: 0.63 10*3/MM3 (ref 0.1–0.9)
MONOCYTES NFR BLD AUTO: 17.7 % (ref 5–12)
NEUTROPHILS # BLD AUTO: 0.74 10*3/MM3 (ref 1.7–7)
NEUTROPHILS NFR BLD AUTO: 20.9 % (ref 42.7–76)
NRBC BLD AUTO-RTO: 0 /100 WBC (ref 0–0.2)
PLATELET # BLD AUTO: 270 10*3/MM3 (ref 140–450)
PMV BLD AUTO: 9.7 FL (ref 6–12)
RBC # BLD AUTO: 5.28 10*6/MM3 (ref 4.14–5.8)
WBC NRBC COR # BLD: 3.55 10*3/MM3 (ref 3.4–10.8)

## 2019-12-30 PROCEDURE — 36415 COLL VENOUS BLD VENIPUNCTURE: CPT

## 2019-12-30 PROCEDURE — 99215 OFFICE O/P EST HI 40 MIN: CPT | Performed by: NURSE PRACTITIONER

## 2019-12-30 PROCEDURE — 85025 COMPLETE CBC W/AUTO DIFF WBC: CPT

## 2019-12-30 NOTE — PROGRESS NOTES
[facility] GROUP OUTPATIENT FOLLOW UP CLINIC VISIT    REASON FOR FOLLOW-UP:    1.  Metastatic colon cancer with large liver metastases  2.  Palliative therapy with FOLFIRI initiated on 11/4/2019.  3. Evidence of disease progression following 4 cycles of FOLFIRI. Plan to initiate FOLFOX and Avastin today    HISTORY OF PRESENT ILLNESS:  Silver Sloan is a 49 y.o. male who returns today for follow up of the above issue.  The patient recently underwent CT imaging on December 23, 2019 with evidence of interval increase in the size of his dominant mass within the right hepatic dome as well as a small area of fat stranding within the right upper quadrant omentum which appears to be centered around a segment of the hepatic flexure and demonstrate coexistent vascular engorgement.  These findings are suggestive of colitis however could also represent peritoneal spread of malignancy. Results were discussed with the patient by Dr. Georges.  Given the unfortunate news of disease progression, we will have to transition therapy to FOLFOX and Avastin.  The patient will proceed with his first cycle of this today.     Overall, he is feeling reasonably well.  He is eating and drinking adequately.  His abdominal pain has been under good control and he has not required his pain medications recently.  He recently had a small amount of blood in his stool related to a hemorrhoid, but otherwise has had regular bowel movements. He denies any numbness or tingling of his upper and lower extremities.  He is concerned about some darkening of his skin on his forehead.  He has several nevi that he believes have darkened with his chemotherapy.  He will follow-up with dermatology regarding this.    His CBC today demonstrates a total blood cell count of 3.55, a low ANC of 0.74, hemoglobin of 13.9, and platelets of 270,000.  He denies any infectious symptoms with any fevers or chills.  No excess bleeding or bruising noted.      He has no  other concerns today.            ONCOLOGIC HISTORY:  He lives in [facility].  He presented to [facility] for further care.  CT imaging indicated liver lesions.  He had a CT-guided liver biopsy performed on 10/14/2019 with pathology consistent with adenocarcinoma metastatic from colon.  He had a PET scan performed on 10/18/2019 which revealed a 2.7 cm mass in the sigmoid colon thought to be the primary malignancy.  The liver is enlarged measuring 22 cm.  There is a 14.3 cm mass in the right lobe of the liver with an SUV of 6.52 and an 11 cm mass in the left lobe of the liver with an SUV of 6.38.  There is a 1.5 cm lesion in the inferior right lobe of the liver with an SUV of 4.39.  There was no other evidence for metastatic disease.  There is no lymphadenopathy in the neck chest abdomen pelvis or retroperitoneum.  Multinodular goiter with thyromegaly was visualized.  No bowel obstruction was visualized.  An umbilical hernia and a left inguinal hernia were visualized.  There is some degenerative disc disease in the lumbar spine.     He saw an oncologist in [place].  He has family that lives here in [place] and he believes that he wants to be treated here in [place].  A Mediport is scheduled in [place] on Friday.     His right upper quadrant abdominal pain is adequately controlled with oxycodone 5 mg tablets 1-2 every 6 hours as needed.  He does continue to have some sharp right lower chest pain with deep breaths.  He has nausea that is well controlled with Zofran and Phenergan.  He is having bowel movements with a bowel regimen.  He has lost about 25 pounds over the past month or so.    He was initially seen in consultation on 10/22/2019.  He wanted to see a surgical oncologist and was referred to Dr. Painting who agreed with initiating therapy with chemotherapy first with surgical resection possible at a later date.  He was referred for colonoscopy and this was done at  the [facility].    He initiated palliative FOLFIRI on 11/4/2019.    Cycle #2 initiated on 11/18/2019.  Mild reaction.  We continue 50 mg of Benadryl with each cycle.    ALLERGIES:  No Known Allergies    MEDICATIONS:  The medication list has been reviewed with the patient by the medical assistant, and the list has been updated in the electronic medical record, which I reviewed.  Medication dosages and frequencies were confirmed to be accurate.    REVIEW OF SYSTEMS:  PAIN:  See Vital Signs below.  GENERAL:  No fevers, chills, night sweats.  Fatigue.  Weight is stable  SKIN: Mild darkening of the skin and a small area on the center anterior chest, darkening nevi on his forehead  HEME/LYMPH:  No abnormal bleeding.  No palpable lymphadenopathy.  EYES:  No vision changes or diplopia.   ENT:  No sore throat or difficulty swallowing.  RESPIRATORY:  No cough, shortness of breath, hemoptysis, or wheezing.  CARDIOVASCULAR:  No chest pain, palpitations, orthopnea, or dyspnea on exertion.  GASTROINTESTINAL: Intermittent abdominal pain.  Overall improved.  No significant bowel changes  GENITOURINARY:  No dysuria or hematuria.  MUSCULOSKELETAL:  No joint pain, swelling, or erythema.  NEUROLOGIC:  No dizziness, loss of consciousness, or seizures.  PSYCHIATRIC:  No depression, anxiety, or mood changes.    There were no vitals filed for this visit.    PHYSICAL EXAMINATION:  GENERAL:  Well-developed well-nourished male; awake, alert and oriented, in no acute distress.  SKIN:  Warm and dry, without rashes, purpura, or petechiae.Several nevi on his forehead  HEAD:  Normocephalic, atraumatic.  EARS:  Hearing intact.  NOSE:  Septum midline.  No excoriations or nasal discharge.  MOUTH:  No stomatitis or ulcers.  Lips are normal.  THROAT:  Oropharynx without lesions or exudates.  LYMPHATICS:  No cervical, supraclavicular, axillary, or inguinal lymphadenopathy.  CHEST:  Lungs are clear to auscultation bilaterally.  No wheezes,  rales, or rhonchi.  Left subclavian Mediport which is benign in appearance.  HEART:  Regular rate; normal rhythm.  No murmurs, gallops or rubs.  ABDOMEN:  Soft, no tenderness to palpation.  Non-distended.  Normal active bowel sounds.  No organomegaly.  EXTREMITIES:  No clubbing, cyanosis, or edema.  NEUROLOGICAL:  No focal neurologic deficits.  Exam unchanged from prior except as updated    DIAGNOSTIC DATA:  Results for orders placed or performed during the hospital encounter of 12/23/19   POC Creatinine   Result Value Ref Range    Creatinine 0.80 0.60 - 1.30 mg/dL       IMAGING: None reviewed today    ASSESSMENT:  This is a 49 y.o. male with:  1.  Metastatic colon cancer: Extensive metastatic disease in the liver at diagnosis.  No other metastatic disease.  There is a 2.7 cm hypermetabolic lesion in the sigmoid colon on his PET imaging which is likely the primary tumor.  He did have a colonoscopy at the [facility] recently and we are working to get these results.  Mismatch repair intact.  K-lynnette mutation positive so he is not a candidate for Erbitux/vectibix.  BRAF mutation negative.  NRAS mutation negative.  HER-2 and M ET negative by FISH.  TRK negative.  He was referred to Dr. Painting to discuss surgical options.  We have elected to proceed with chemotherapy followed by repeat imaging and then he will be reassessed to see if he is a surgical candidate or if there are any other local therapy options with respect to the liver.  We have elected for palliative therapy with FOLFIRI.      As noted above, the patient received 4 cycles of FOLFIRI and underwent CT imaging with evidence of disease progression.  Per Dr. Georges, we will transition the patient to FOLFOX plus Avastin.  I have reviewed possible side effects with the patient including cold sensitivity, peripheral neuropathy, nausea and vomiting, bowel irregularity, and cytopenias. .  I have also given him additional handout outlining possible side  effects.   Unfortunately, as noted above his ANC is low at 0.74. We will be be unable to proceed with treatment today.  We will delay initiation of the FOLFOX and Avastin by 1 week.  Neutropenic precautions have been discussed with the patient.    2.  Cancer related pain: He was having worsening pain on oxycodone and therefore this was switched to immediate release morphine.His pain has been reasonably well controlled and he is only required a few doses of the MSIR.  Pain is stable today. He does not require refills.     3.  Chemotherapy-induced neutropenia.  As noted above, the patient is recurrently neutropenic today.  We will have to delay initiation of treatment.  We will continue to monitor labs closely on his new regimen    4.  Tachycardia: Patient is slightly tachycardic today with a heart rate of 102.  He has a regular rhythm.  He is asymptomatic.  We will continue to monitor..    PLAN:  1.  We will transition to therapy with FOLFOX plus Avastin.  Treatment initiation will be delayed 1 week secondary to neutropenia. Formal handouts have been provided to the patient with side effects reviewed  2.  He was seen by genetic counseling on 12/3/2019 and we await these results  3.  Continue morphine immediate release 15 mg as needed for pain  4.  The patient return in 1 week to begin cycle #1 of FOLFOX and Avastin.  5.  He will see Dr. Georges in 3 weeks in conjunction with cycle #2 of therapy.  6.  Neutropenic precautions reviewed with the patient.  7. I have asked the patient to call our office with any issues or concerns prior to his next office visit.     -A total of 40 minutes spent in face-to-face interaction with the patient and his sister with greater than half this time in counseling and education.  I did discuss possible side effects of his new chemotherapy and have reviewed recent CT imaging

## 2019-12-31 ENCOUNTER — TELEPHONE (OUTPATIENT)
Dept: ONCOLOGY | Facility: CLINIC | Age: 49
End: 2019-12-31

## 2019-12-31 NOTE — TELEPHONE ENCOUNTER
Spoke with patient's brother re: whether patient was a candidate for direct endothelial tumor ablation at [facility] and what his tumor markers are. Discussed with LLOYD Carmona and message sent to Dr. Georges. Let Sunny know that Dr. Georges is out of the office for a few days but he will be getting back to him when he returns.

## 2020-01-01 DIAGNOSIS — C18.7 PRIMARY MALIGNANT NEOPLASM OF SIGMOID COLON (HCC): ICD-10-CM

## 2020-01-02 ENCOUNTER — TELEPHONE (OUTPATIENT)
Dept: ONCOLOGY | Facility: CLINIC | Age: 50
End: 2020-01-02

## 2020-01-02 NOTE — TELEPHONE ENCOUNTER
Spoke with him on the phone.  He's feeling fatigued.      Advised him that we have looked at the usual genetic changes we look for initially for colorectal cancer.  Advised him that he's welcome to look for a clinical trial at any point but that we are following guidelines with recommended treatment at this point.    Advised him that local therapy to the liver is likely not recommended at this point as we need to see a decrease in size in the liver tumors first.    Advised that if neutropenia is still problematic we will try to add GCSF if allowed by insurance.      He will return to the office on Monday as scheduled.

## 2020-01-06 ENCOUNTER — DOCUMENTATION (OUTPATIENT)
Dept: ONCOLOGY | Facility: CLINIC | Age: 50
End: 2020-01-06

## 2020-01-06 ENCOUNTER — INFUSION (OUTPATIENT)
Dept: ONCOLOGY | Facility: HOSPITAL | Age: 50
End: 2020-01-06

## 2020-01-06 VITALS
SYSTOLIC BLOOD PRESSURE: 128 MMHG | HEART RATE: 97 BPM | TEMPERATURE: 98.3 F | OXYGEN SATURATION: 97 % | DIASTOLIC BLOOD PRESSURE: 80 MMHG | WEIGHT: 221 LBS | BODY MASS INDEX: 29.93 KG/M2

## 2020-01-06 DIAGNOSIS — C18.7 PRIMARY MALIGNANT NEOPLASM OF SIGMOID COLON (HCC): Primary | ICD-10-CM

## 2020-01-06 LAB
ALBUMIN SERPL-MCNC: 3.6 G/DL (ref 3.5–5.2)
ALBUMIN/GLOB SERPL: 0.8 G/DL (ref 1.1–2.4)
ALP SERPL-CCNC: 143 U/L (ref 38–116)
ALT SERPL W P-5'-P-CCNC: 15 U/L (ref 0–41)
ANION GAP SERPL CALCULATED.3IONS-SCNC: 14.8 MMOL/L (ref 5–15)
AST SERPL-CCNC: 37 U/L (ref 0–40)
BASOPHILS # BLD AUTO: 0.07 10*3/MM3 (ref 0–0.2)
BASOPHILS NFR BLD AUTO: 0.8 % (ref 0–1.5)
BILIRUB SERPL-MCNC: 1 MG/DL (ref 0.2–1.2)
BILIRUB UR QL STRIP: ABNORMAL
BUN BLD-MCNC: 12 MG/DL (ref 6–20)
BUN/CREAT SERPL: 11.3 (ref 7.3–30)
CALCIUM SPEC-SCNC: 9.3 MG/DL (ref 8.5–10.2)
CEA SERPL-MCNC: 6735 NG/ML
CHLORIDE SERPL-SCNC: 96 MMOL/L (ref 98–107)
CLARITY UR: ABNORMAL
CO2 SERPL-SCNC: 23.2 MMOL/L (ref 22–29)
COLOR UR: ABNORMAL
CREAT BLD-MCNC: 1.06 MG/DL (ref 0.7–1.3)
DEPRECATED RDW RBC AUTO: 52 FL (ref 37–54)
EOSINOPHIL # BLD AUTO: 0.54 10*3/MM3 (ref 0–0.4)
EOSINOPHIL NFR BLD AUTO: 6.5 % (ref 0.3–6.2)
ERYTHROCYTE [DISTWIDTH] IN BLOOD BY AUTOMATED COUNT: 17.8 % (ref 12.3–15.4)
GFR SERPL CREATININE-BSD FRML MDRD: 74 ML/MIN/1.73
GFR SERPL CREATININE-BSD FRML MDRD: 90 ML/MIN/1.73
GLOBULIN UR ELPH-MCNC: 4.7 GM/DL (ref 1.8–3.5)
GLUCOSE BLD-MCNC: 187 MG/DL (ref 74–124)
GLUCOSE UR STRIP-MCNC: NEGATIVE MG/DL
HCT VFR BLD AUTO: 39.2 % (ref 37.5–51)
HGB BLD-MCNC: 12.6 G/DL (ref 13–17.7)
HGB UR QL STRIP.AUTO: NEGATIVE
IMM GRANULOCYTES # BLD AUTO: 0.14 10*3/MM3 (ref 0–0.05)
IMM GRANULOCYTES NFR BLD AUTO: 1.7 % (ref 0–0.5)
KETONES UR QL STRIP: NEGATIVE
LEUKOCYTE ESTERASE UR QL STRIP.AUTO: NEGATIVE
LYMPHOCYTES # BLD AUTO: 1.55 10*3/MM3 (ref 0.7–3.1)
LYMPHOCYTES NFR BLD AUTO: 18.7 % (ref 19.6–45.3)
MCH RBC QN AUTO: 26.2 PG (ref 26.6–33)
MCHC RBC AUTO-ENTMCNC: 32.1 G/DL (ref 31.5–35.7)
MCV RBC AUTO: 81.5 FL (ref 79–97)
MONOCYTES # BLD AUTO: 1.06 10*3/MM3 (ref 0.1–0.9)
MONOCYTES NFR BLD AUTO: 12.8 % (ref 5–12)
NEUTROPHILS # BLD AUTO: 4.95 10*3/MM3 (ref 1.7–7)
NEUTROPHILS NFR BLD AUTO: 59.5 % (ref 42.7–76)
NITRITE UR QL STRIP: NEGATIVE
NRBC BLD AUTO-RTO: 0 /100 WBC (ref 0–0.2)
PH UR STRIP.AUTO: 5.5 [PH] (ref 4.5–8)
PLATELET # BLD AUTO: 292 10*3/MM3 (ref 140–450)
PMV BLD AUTO: 9.2 FL (ref 6–12)
POTASSIUM BLD-SCNC: 3.7 MMOL/L (ref 3.5–4.7)
PROT SERPL-MCNC: 8.3 G/DL (ref 6.3–8)
PROT UR QL STRIP: ABNORMAL
RBC # BLD AUTO: 4.81 10*6/MM3 (ref 4.14–5.8)
SODIUM BLD-SCNC: 134 MMOL/L (ref 134–145)
SP GR UR STRIP: 1.02 (ref 1–1.03)
UROBILINOGEN UR QL STRIP: ABNORMAL
WBC NRBC COR # BLD: 8.31 10*3/MM3 (ref 3.4–10.8)

## 2020-01-06 PROCEDURE — 96375 TX/PRO/DX INJ NEW DRUG ADDON: CPT | Performed by: NURSE PRACTITIONER

## 2020-01-06 PROCEDURE — 25010000002 LEUCOVORIN CALCIUM PER 50 MG: Performed by: INTERNAL MEDICINE

## 2020-01-06 PROCEDURE — 80053 COMPREHEN METABOLIC PANEL: CPT | Performed by: NURSE PRACTITIONER

## 2020-01-06 PROCEDURE — 96368 THER/DIAG CONCURRENT INF: CPT | Performed by: NURSE PRACTITIONER

## 2020-01-06 PROCEDURE — 96417 CHEMO IV INFUS EACH ADDL SEQ: CPT | Performed by: NURSE PRACTITIONER

## 2020-01-06 PROCEDURE — 25010000002 BEVACIZUMAB-AWWB 100 MG/4ML SOLUTION 4 ML VIAL: Performed by: INTERNAL MEDICINE

## 2020-01-06 PROCEDURE — 82378 CARCINOEMBRYONIC ANTIGEN: CPT | Performed by: INTERNAL MEDICINE

## 2020-01-06 PROCEDURE — 96416 CHEMO PROLONG INFUSE W/PUMP: CPT | Performed by: NURSE PRACTITIONER

## 2020-01-06 PROCEDURE — 25010000002 PALONOSETRON PER 25 MCG: Performed by: INTERNAL MEDICINE

## 2020-01-06 PROCEDURE — 96415 CHEMO IV INFUSION ADDL HR: CPT | Performed by: NURSE PRACTITIONER

## 2020-01-06 PROCEDURE — 25010000002 FLUOROURACIL PER 500 MG: Performed by: INTERNAL MEDICINE

## 2020-01-06 PROCEDURE — 25010000002 DEXAMETHASONE SODIUM PHOSPHATE 100 MG/10ML SOLUTION: Performed by: INTERNAL MEDICINE

## 2020-01-06 PROCEDURE — 25010000002 OXALIPLATIN PER 0.5 MG: Performed by: INTERNAL MEDICINE

## 2020-01-06 PROCEDURE — 96413 CHEMO IV INFUSION 1 HR: CPT | Performed by: NURSE PRACTITIONER

## 2020-01-06 PROCEDURE — 96411 CHEMO IV PUSH ADDL DRUG: CPT | Performed by: NURSE PRACTITIONER

## 2020-01-06 PROCEDURE — 81003 URINALYSIS AUTO W/O SCOPE: CPT | Performed by: NURSE PRACTITIONER

## 2020-01-06 PROCEDURE — 25010000002 BEVACIZUMAB-AWWB 400 MG/16ML SOLUTION 16 ML VIAL: Performed by: INTERNAL MEDICINE

## 2020-01-06 PROCEDURE — 85025 COMPLETE CBC W/AUTO DIFF WBC: CPT | Performed by: NURSE PRACTITIONER

## 2020-01-06 RX ORDER — SODIUM CHLORIDE 9 MG/ML
250 INJECTION, SOLUTION INTRAVENOUS ONCE
Status: COMPLETED | OUTPATIENT
Start: 2020-01-06 | End: 2020-01-06

## 2020-01-06 RX ORDER — FLUOROURACIL 50 MG/ML
400 INJECTION, SOLUTION INTRAVENOUS ONCE
Status: COMPLETED | OUTPATIENT
Start: 2020-01-06 | End: 2020-01-06

## 2020-01-06 RX ORDER — DEXTROSE MONOHYDRATE 50 MG/ML
250 INJECTION, SOLUTION INTRAVENOUS ONCE
Status: COMPLETED | OUTPATIENT
Start: 2020-01-06 | End: 2020-01-06

## 2020-01-06 RX ORDER — ONDANSETRON HYDROCHLORIDE 8 MG/1
8 TABLET, FILM COATED ORAL 3 TIMES DAILY PRN
Qty: 30 TABLET | Refills: 5 | Status: SHIPPED | OUTPATIENT
Start: 2020-01-06

## 2020-01-06 RX ORDER — PALONOSETRON 0.05 MG/ML
0.25 INJECTION, SOLUTION INTRAVENOUS ONCE
Status: COMPLETED | OUTPATIENT
Start: 2020-01-06 | End: 2020-01-06

## 2020-01-06 RX ADMIN — FLUOROURACIL 900 MG: 50 INJECTION, SOLUTION INTRAVENOUS at 13:19

## 2020-01-06 RX ADMIN — DEXTROSE 250 ML: 5 SOLUTION INTRAVENOUS at 11:16

## 2020-01-06 RX ADMIN — PALONOSETRON 0.25 MG: 0.05 INJECTION, SOLUTION INTRAVENOUS at 09:23

## 2020-01-06 RX ADMIN — OXALIPLATIN 190 MG: 5 INJECTION, SOLUTION, CONCENTRATE INTRAVENOUS at 11:18

## 2020-01-06 RX ADMIN — BEVACIZUMAB-AWWB 500 MG: 400 INJECTION, SOLUTION INTRAVENOUS at 09:47

## 2020-01-06 RX ADMIN — DEXAMETHASONE SODIUM PHOSPHATE 12 MG: 10 INJECTION, SOLUTION INTRAMUSCULAR; INTRAVENOUS at 09:23

## 2020-01-06 RX ADMIN — LEUCOVORIN CALCIUM 900 MG: 350 INJECTION, POWDER, LYOPHILIZED, FOR SOLUTION INTRAMUSCULAR; INTRAVENOUS at 11:18

## 2020-01-06 RX ADMIN — SODIUM CHLORIDE 250 ML: 9 INJECTION, SOLUTION INTRAVENOUS at 09:23

## 2020-01-06 RX ADMIN — FLUOROURACIL 5400 MG: 50 INJECTION, SOLUTION INTRAVENOUS at 13:19

## 2020-01-06 NOTE — PROGRESS NOTES
Fax rec from [facility] stating [facility] has started a PA for pts Ondansetron. I have completed the request and submitted to [place].    Waiting for a decision.

## 2020-01-07 ENCOUNTER — DOCUMENTATION (OUTPATIENT)
Dept: ONCOLOGY | Facility: CLINIC | Age: 50
End: 2020-01-07

## 2020-01-08 ENCOUNTER — INFUSION (OUTPATIENT)
Dept: ONCOLOGY | Facility: HOSPITAL | Age: 50
End: 2020-01-08

## 2020-01-08 ENCOUNTER — TELEPHONE (OUTPATIENT)
Dept: ONCOLOGY | Facility: HOSPITAL | Age: 50
End: 2020-01-08

## 2020-01-08 DIAGNOSIS — Z45.2 ENCOUNTER FOR FITTING AND ADJUSTMENT OF VASCULAR CATHETER: ICD-10-CM

## 2020-01-08 DIAGNOSIS — C18.7 PRIMARY MALIGNANT NEOPLASM OF SIGMOID COLON (HCC): Primary | ICD-10-CM

## 2020-01-08 RX ORDER — HEPARIN SODIUM (PORCINE) LOCK FLUSH IV SOLN 100 UNIT/ML 100 UNIT/ML
500 SOLUTION INTRAVENOUS AS NEEDED
Status: CANCELLED | OUTPATIENT
Start: 2020-01-08

## 2020-01-08 RX ORDER — SODIUM CHLORIDE 0.9 % (FLUSH) 0.9 %
10 SYRINGE (ML) INJECTION AS NEEDED
Status: CANCELLED | OUTPATIENT
Start: 2020-01-08

## 2020-01-08 RX ORDER — SODIUM CHLORIDE 0.9 % (FLUSH) 0.9 %
10 SYRINGE (ML) INJECTION AS NEEDED
Status: DISCONTINUED | OUTPATIENT
Start: 2020-01-08 | End: 2020-01-08 | Stop reason: HOSPADM

## 2020-01-08 RX ADMIN — Medication 10 ML: at 11:40

## 2020-01-08 RX ADMIN — SODIUM CHLORIDE, PRESERVATIVE FREE 500 UNITS: 5 INJECTION INTRAVENOUS at 11:40

## 2020-01-08 NOTE — TELEPHONE ENCOUNTER
Her today for disconnect. Follow up post first treatment of oxiliplatin and avastin. No complaints. Stated has felt good. Instructed to call with any concerns or questions.

## 2020-01-20 ENCOUNTER — LAB (OUTPATIENT)
Dept: LAB | Facility: HOSPITAL | Age: 50
End: 2020-01-20

## 2020-01-20 ENCOUNTER — OFFICE VISIT (OUTPATIENT)
Dept: ONCOLOGY | Facility: CLINIC | Age: 50
End: 2020-01-20

## 2020-01-20 ENCOUNTER — INFUSION (OUTPATIENT)
Dept: ONCOLOGY | Facility: HOSPITAL | Age: 50
End: 2020-01-20

## 2020-01-20 VITALS
WEIGHT: 223 LBS | DIASTOLIC BLOOD PRESSURE: 85 MMHG | OXYGEN SATURATION: 98 % | HEIGHT: 72 IN | HEART RATE: 97 BPM | SYSTOLIC BLOOD PRESSURE: 113 MMHG | BODY MASS INDEX: 30.2 KG/M2 | RESPIRATION RATE: 16 BRPM | TEMPERATURE: 97.4 F

## 2020-01-20 DIAGNOSIS — C18.7 PRIMARY MALIGNANT NEOPLASM OF SIGMOID COLON (HCC): ICD-10-CM

## 2020-01-20 DIAGNOSIS — C18.7 PRIMARY MALIGNANT NEOPLASM OF SIGMOID COLON (HCC): Primary | ICD-10-CM

## 2020-01-20 LAB
ALBUMIN SERPL-MCNC: 3.6 G/DL (ref 3.5–5.2)
ALBUMIN/GLOB SERPL: 0.9 G/DL (ref 1.1–2.4)
ALP SERPL-CCNC: 127 U/L (ref 38–116)
ALT SERPL W P-5'-P-CCNC: 22 U/L (ref 0–41)
ANION GAP SERPL CALCULATED.3IONS-SCNC: 12.3 MMOL/L (ref 5–15)
AST SERPL-CCNC: 21 U/L (ref 0–40)
BASOPHILS # BLD AUTO: 0.08 10*3/MM3 (ref 0–0.2)
BASOPHILS NFR BLD AUTO: 1.3 % (ref 0–1.5)
BILIRUB SERPL-MCNC: 0.3 MG/DL (ref 0.2–1.2)
BILIRUB UR QL STRIP: NEGATIVE
BUN BLD-MCNC: 10 MG/DL (ref 6–20)
BUN/CREAT SERPL: 10.9 (ref 7.3–30)
CALCIUM SPEC-SCNC: 9.2 MG/DL (ref 8.5–10.2)
CHLORIDE SERPL-SCNC: 102 MMOL/L (ref 98–107)
CLARITY UR: CLEAR
CO2 SERPL-SCNC: 24.7 MMOL/L (ref 22–29)
COLOR UR: YELLOW
CREAT BLD-MCNC: 0.92 MG/DL (ref 0.7–1.3)
DEPRECATED RDW RBC AUTO: 55.9 FL (ref 37–54)
EOSINOPHIL # BLD AUTO: 0.49 10*3/MM3 (ref 0–0.4)
EOSINOPHIL NFR BLD AUTO: 7.8 % (ref 0.3–6.2)
ERYTHROCYTE [DISTWIDTH] IN BLOOD BY AUTOMATED COUNT: 18.9 % (ref 12.3–15.4)
GFR SERPL CREATININE-BSD FRML MDRD: 106 ML/MIN/1.73
GFR SERPL CREATININE-BSD FRML MDRD: 87 ML/MIN/1.73
GLOBULIN UR ELPH-MCNC: 4.1 GM/DL (ref 1.8–3.5)
GLUCOSE BLD-MCNC: 141 MG/DL (ref 74–124)
GLUCOSE UR STRIP-MCNC: NEGATIVE MG/DL
HCT VFR BLD AUTO: 42.8 % (ref 37.5–51)
HGB BLD-MCNC: 13.3 G/DL (ref 13–17.7)
HGB UR QL STRIP.AUTO: NEGATIVE
IMM GRANULOCYTES # BLD AUTO: 0.01 10*3/MM3 (ref 0–0.05)
IMM GRANULOCYTES NFR BLD AUTO: 0.2 % (ref 0–0.5)
KETONES UR QL STRIP: NEGATIVE
LEUKOCYTE ESTERASE UR QL STRIP.AUTO: ABNORMAL
LYMPHOCYTES # BLD AUTO: 2.82 10*3/MM3 (ref 0.7–3.1)
LYMPHOCYTES NFR BLD AUTO: 44.7 % (ref 19.6–45.3)
MCH RBC QN AUTO: 26.2 PG (ref 26.6–33)
MCHC RBC AUTO-ENTMCNC: 31.1 G/DL (ref 31.5–35.7)
MCV RBC AUTO: 84.3 FL (ref 79–97)
MONOCYTES # BLD AUTO: 0.43 10*3/MM3 (ref 0.1–0.9)
MONOCYTES NFR BLD AUTO: 6.8 % (ref 5–12)
NEUTROPHILS # BLD AUTO: 2.48 10*3/MM3 (ref 1.7–7)
NEUTROPHILS NFR BLD AUTO: 39.2 % (ref 42.7–76)
NITRITE UR QL STRIP: NEGATIVE
NRBC BLD AUTO-RTO: 0 /100 WBC (ref 0–0.2)
PH UR STRIP.AUTO: 5.5 [PH] (ref 4.5–8)
PLATELET # BLD AUTO: 308 10*3/MM3 (ref 140–450)
PMV BLD AUTO: 8.8 FL (ref 6–12)
POTASSIUM BLD-SCNC: 4.3 MMOL/L (ref 3.5–4.7)
PROT SERPL-MCNC: 7.7 G/DL (ref 6.3–8)
PROT UR QL STRIP: NEGATIVE
RBC # BLD AUTO: 5.08 10*6/MM3 (ref 4.14–5.8)
SODIUM BLD-SCNC: 139 MMOL/L (ref 134–145)
SP GR UR STRIP: >=1.03 (ref 1–1.03)
UROBILINOGEN UR QL STRIP: ABNORMAL
WBC NRBC COR # BLD: 6.31 10*3/MM3 (ref 3.4–10.8)

## 2020-01-20 PROCEDURE — 96368 THER/DIAG CONCURRENT INF: CPT | Performed by: INTERNAL MEDICINE

## 2020-01-20 PROCEDURE — 25010000002 BEVACIZUMAB-AWWB 100 MG/4ML SOLUTION 4 ML VIAL: Performed by: INTERNAL MEDICINE

## 2020-01-20 PROCEDURE — 96413 CHEMO IV INFUSION 1 HR: CPT | Performed by: INTERNAL MEDICINE

## 2020-01-20 PROCEDURE — 99215 OFFICE O/P EST HI 40 MIN: CPT | Performed by: INTERNAL MEDICINE

## 2020-01-20 PROCEDURE — 96375 TX/PRO/DX INJ NEW DRUG ADDON: CPT | Performed by: INTERNAL MEDICINE

## 2020-01-20 PROCEDURE — 25010000002 DEXAMETHASONE SODIUM PHOSPHATE 100 MG/10ML SOLUTION: Performed by: INTERNAL MEDICINE

## 2020-01-20 PROCEDURE — 25010000002 LEUCOVORIN CALCIUM PER 50 MG: Performed by: INTERNAL MEDICINE

## 2020-01-20 PROCEDURE — 25010000002 DIPHENHYDRAMINE 1 EACH BAG: Performed by: INTERNAL MEDICINE

## 2020-01-20 PROCEDURE — 80053 COMPREHEN METABOLIC PANEL: CPT

## 2020-01-20 PROCEDURE — 96411 CHEMO IV PUSH ADDL DRUG: CPT | Performed by: INTERNAL MEDICINE

## 2020-01-20 PROCEDURE — 25010000002 BEVACIZUMAB-AWWB 400 MG/16ML SOLUTION 16 ML VIAL: Performed by: INTERNAL MEDICINE

## 2020-01-20 PROCEDURE — 25010000002 FLUOROURACIL PER 500 MG: Performed by: INTERNAL MEDICINE

## 2020-01-20 PROCEDURE — 96416 CHEMO PROLONG INFUSE W/PUMP: CPT | Performed by: INTERNAL MEDICINE

## 2020-01-20 PROCEDURE — 81003 URINALYSIS AUTO W/O SCOPE: CPT

## 2020-01-20 PROCEDURE — 96417 CHEMO IV INFUS EACH ADDL SEQ: CPT | Performed by: INTERNAL MEDICINE

## 2020-01-20 PROCEDURE — 36415 COLL VENOUS BLD VENIPUNCTURE: CPT

## 2020-01-20 PROCEDURE — 25010000002 OXALIPLATIN PER 0.5 MG: Performed by: INTERNAL MEDICINE

## 2020-01-20 PROCEDURE — 25010000002 PALONOSETRON PER 25 MCG: Performed by: INTERNAL MEDICINE

## 2020-01-20 PROCEDURE — 85025 COMPLETE CBC W/AUTO DIFF WBC: CPT

## 2020-01-20 RX ORDER — DIPHENHYDRAMINE HYDROCHLORIDE 50 MG/ML
50 INJECTION INTRAMUSCULAR; INTRAVENOUS AS NEEDED
Status: CANCELLED | OUTPATIENT
Start: 2020-02-17

## 2020-01-20 RX ORDER — DEXTROSE MONOHYDRATE 50 MG/ML
250 INJECTION, SOLUTION INTRAVENOUS ONCE
Status: COMPLETED | OUTPATIENT
Start: 2020-01-20 | End: 2020-01-20

## 2020-01-20 RX ORDER — FLUOROURACIL 50 MG/ML
400 INJECTION, SOLUTION INTRAVENOUS ONCE
Status: CANCELLED | OUTPATIENT
Start: 2020-02-17

## 2020-01-20 RX ORDER — DIPHENHYDRAMINE HYDROCHLORIDE 50 MG/ML
50 INJECTION INTRAMUSCULAR; INTRAVENOUS AS NEEDED
Status: CANCELLED | OUTPATIENT
Start: 2020-01-20

## 2020-01-20 RX ORDER — FLUOROURACIL 50 MG/ML
400 INJECTION, SOLUTION INTRAVENOUS ONCE
Status: CANCELLED | OUTPATIENT
Start: 2020-01-20

## 2020-01-20 RX ORDER — DEXTROSE MONOHYDRATE 50 MG/ML
250 INJECTION, SOLUTION INTRAVENOUS ONCE
Status: CANCELLED | OUTPATIENT
Start: 2020-01-20

## 2020-01-20 RX ORDER — FAMOTIDINE 10 MG/ML
20 INJECTION, SOLUTION INTRAVENOUS AS NEEDED
Status: CANCELLED | OUTPATIENT
Start: 2020-03-02

## 2020-01-20 RX ORDER — SODIUM CHLORIDE 9 MG/ML
250 INJECTION, SOLUTION INTRAVENOUS ONCE
Status: CANCELLED | OUTPATIENT
Start: 2020-01-20

## 2020-01-20 RX ORDER — PALONOSETRON 0.05 MG/ML
0.25 INJECTION, SOLUTION INTRAVENOUS ONCE
Status: CANCELLED | OUTPATIENT
Start: 2020-03-02

## 2020-01-20 RX ORDER — SODIUM CHLORIDE 9 MG/ML
250 INJECTION, SOLUTION INTRAVENOUS ONCE
Status: CANCELLED | OUTPATIENT
Start: 2020-02-17

## 2020-01-20 RX ORDER — PALONOSETRON 0.05 MG/ML
0.25 INJECTION, SOLUTION INTRAVENOUS ONCE
Status: COMPLETED | OUTPATIENT
Start: 2020-01-20 | End: 2020-01-20

## 2020-01-20 RX ORDER — FAMOTIDINE 10 MG/ML
20 INJECTION, SOLUTION INTRAVENOUS AS NEEDED
Status: CANCELLED | OUTPATIENT
Start: 2020-02-03

## 2020-01-20 RX ORDER — FAMOTIDINE 10 MG/ML
20 INJECTION, SOLUTION INTRAVENOUS AS NEEDED
Status: CANCELLED | OUTPATIENT
Start: 2020-01-20

## 2020-01-20 RX ORDER — DEXTROSE MONOHYDRATE 50 MG/ML
250 INJECTION, SOLUTION INTRAVENOUS ONCE
Status: CANCELLED | OUTPATIENT
Start: 2020-02-03

## 2020-01-20 RX ORDER — FLUOROURACIL 50 MG/ML
400 INJECTION, SOLUTION INTRAVENOUS ONCE
Status: CANCELLED | OUTPATIENT
Start: 2020-02-03

## 2020-01-20 RX ORDER — PALONOSETRON 0.05 MG/ML
0.25 INJECTION, SOLUTION INTRAVENOUS ONCE
Status: CANCELLED | OUTPATIENT
Start: 2020-02-17

## 2020-01-20 RX ORDER — DIPHENHYDRAMINE HYDROCHLORIDE 50 MG/ML
50 INJECTION INTRAMUSCULAR; INTRAVENOUS AS NEEDED
Status: CANCELLED | OUTPATIENT
Start: 2020-02-03

## 2020-01-20 RX ORDER — DIPHENHYDRAMINE HYDROCHLORIDE 50 MG/ML
50 INJECTION INTRAMUSCULAR; INTRAVENOUS AS NEEDED
Status: CANCELLED | OUTPATIENT
Start: 2020-03-02

## 2020-01-20 RX ORDER — FAMOTIDINE 10 MG/ML
20 INJECTION, SOLUTION INTRAVENOUS AS NEEDED
Status: CANCELLED | OUTPATIENT
Start: 2020-02-17

## 2020-01-20 RX ORDER — PALONOSETRON 0.05 MG/ML
0.25 INJECTION, SOLUTION INTRAVENOUS ONCE
Status: CANCELLED | OUTPATIENT
Start: 2020-02-03

## 2020-01-20 RX ORDER — SODIUM CHLORIDE 9 MG/ML
250 INJECTION, SOLUTION INTRAVENOUS ONCE
Status: CANCELLED | OUTPATIENT
Start: 2020-02-03

## 2020-01-20 RX ORDER — DEXTROSE MONOHYDRATE 50 MG/ML
250 INJECTION, SOLUTION INTRAVENOUS ONCE
Status: CANCELLED | OUTPATIENT
Start: 2020-02-17

## 2020-01-20 RX ORDER — FLUOROURACIL 50 MG/ML
400 INJECTION, SOLUTION INTRAVENOUS ONCE
Status: COMPLETED | OUTPATIENT
Start: 2020-01-20 | End: 2020-01-20

## 2020-01-20 RX ORDER — PALONOSETRON 0.05 MG/ML
0.25 INJECTION, SOLUTION INTRAVENOUS ONCE
Status: CANCELLED | OUTPATIENT
Start: 2020-01-20

## 2020-01-20 RX ORDER — SODIUM CHLORIDE 9 MG/ML
250 INJECTION, SOLUTION INTRAVENOUS ONCE
Status: CANCELLED | OUTPATIENT
Start: 2020-03-02

## 2020-01-20 RX ORDER — SODIUM CHLORIDE 9 MG/ML
250 INJECTION, SOLUTION INTRAVENOUS ONCE
Status: COMPLETED | OUTPATIENT
Start: 2020-01-20 | End: 2020-01-20

## 2020-01-20 RX ORDER — DEXTROSE MONOHYDRATE 50 MG/ML
250 INJECTION, SOLUTION INTRAVENOUS ONCE
Status: CANCELLED | OUTPATIENT
Start: 2020-03-02

## 2020-01-20 RX ORDER — FLUOROURACIL 50 MG/ML
400 INJECTION, SOLUTION INTRAVENOUS ONCE
Status: CANCELLED | OUTPATIENT
Start: 2020-03-02

## 2020-01-20 RX ADMIN — DEXTROSE MONOHYDRATE 250 ML: 5 INJECTION, SOLUTION INTRAVENOUS at 11:36

## 2020-01-20 RX ADMIN — DEXAMETHASONE SODIUM PHOSPHATE 12 MG: 10 INJECTION, SOLUTION INTRAMUSCULAR; INTRAVENOUS at 09:49

## 2020-01-20 RX ADMIN — OXALIPLATIN 190 MG: 5 INJECTION, SOLUTION, CONCENTRATE INTRAVENOUS at 11:39

## 2020-01-20 RX ADMIN — LEUCOVORIN CALCIUM 900 MG: 350 INJECTION, POWDER, LYOPHILIZED, FOR SOLUTION INTRAMUSCULAR; INTRAVENOUS at 11:39

## 2020-01-20 RX ADMIN — SODIUM CHLORIDE 250 ML: 9 INJECTION, SOLUTION INTRAVENOUS at 09:46

## 2020-01-20 RX ADMIN — DIPHENHYDRAMINE HYDROCHLORIDE 25 MG: 50 INJECTION INTRAMUSCULAR; INTRAVENOUS at 10:10

## 2020-01-20 RX ADMIN — PALONOSETRON HYDROCHLORIDE 0.25 MG: 0.25 INJECTION, SOLUTION INTRAVENOUS at 09:47

## 2020-01-20 RX ADMIN — FLUOROURACIL 5400 MG: 50 INJECTION, SOLUTION INTRAVENOUS at 14:25

## 2020-01-20 RX ADMIN — FLUOROURACIL 900 MG: 50 INJECTION, SOLUTION INTRAVENOUS at 14:25

## 2020-01-20 RX ADMIN — BEVACIZUMAB-AWWB 500 MG: 400 INJECTION, SOLUTION INTRAVENOUS at 10:31

## 2020-01-20 NOTE — PROGRESS NOTES
[facility] GROUP OUTPATIENT FOLLOW UP CLINIC VISIT    REASON FOR FOLLOW-UP:    1.  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  Palliative therapy with FOLFIRI initiated on 11/4/2019.  3. Evidence of disease progression following 4 cycles of FOLFIRI.  4.  FOLFOX+bevacizumab initiated 1/6/2020    HISTORY OF PRESENT ILLNESS:  Silver Sloan is a 49 y.o. male who returns today for follow up of the above issue, anticipating cycle 2 of FOLFOX+frank.      He tolerated cycle #1 very well with some fatigue lasting for about 2 days starting on day 3 of therapy.  He was in bed most of those days.  After that he felt much better with improved energy.  No nausea vomiting or diarrhea.  No mucositis.  No abdominal pain.  No headaches or chest pain.  He has noted some increase in nevi present mostly on his forehead.    ONCOLOGIC HISTORY:  He lives in [facility].  He presented to [facility] for further care.  CT imaging indicated liver lesions.  He had a CT-guided liver biopsy performed on 10/14/2019 with pathology consistent with adenocarcinoma metastatic from colon.  He had a PET scan performed on 10/18/2019 which revealed a 2.7 cm mass in the sigmoid colon thought to be the primary malignancy.  The liver is enlarged measuring 22 cm.  There is a 14.3 cm mass in the right lobe of the liver with an SUV of 6.52 and an 11 cm mass in the left lobe of the liver with an SUV of 6.38.  There is a 1.5 cm lesion in the inferior right lobe of the liver with an SUV of 4.39.  There was no other evidence for metastatic disease.  There is no lymphadenopathy in the neck chest abdomen pelvis or retroperitoneum.  Multinodular goiter with thyromegaly was visualized.  No bowel obstruction was visualized.  An umbilical hernia and a left inguinal hernia were visualized.  There is some degenerative disc disease in the lumbar spine.     He saw an oncologist in [place].  He has family  that lives here in [place] and he believes that he wants to be treated here in [place].  A Mediport is scheduled in [place] on Friday.     His right upper quadrant abdominal pain is adequately controlled with oxycodone 5 mg tablets 1-2 every 6 hours as needed.  He does continue to have some sharp right lower chest pain with deep breaths.  He has nausea that is well controlled with Zofran and Phenergan.  He is having bowel movements with a bowel regimen.  He has lost about 25 pounds over the past month or so.    He was initially seen in consultation on 10/22/2019.  He wanted to see a surgical oncologist and was referred to Dr. Painting who agreed with initiating therapy with chemotherapy first with surgical resection possible at a later date.  He was referred for colonoscopy and this was done at the [facility].    He initiated palliative FOLFIRI on 11/4/2019.    Cycle #2 initiated on 11/18/2019.  Mild reaction.  We continue 50 mg of Benadryl with each cycle.    CT imaging on 12/23/2019 with progression.      Neutropenic on 12/30/2019 requiring a delay in therapy.      CEA 4399 on 10/22/2019 --> 6735 on 1/6/2020    FOLFOX+frank initiated 1/6/2020    ALLERGIES:  No Known Allergies    MEDICATIONS:  The medication list has been reviewed with the patient by the medical assistant, and the list has been updated in the electronic medical record, which I reviewed.  Medication dosages and frequencies were confirmed to be accurate.    REVIEW OF SYSTEMS:  PAIN:  See Vital Signs below.  GENERAL:  No fevers, chills, night sweats.  Fatigue.  Weight is stable  SKIN: Increase in nevi on his head  HEME/LYMPH:  No abnormal bleeding.  No palpable lymphadenopathy.  EYES:  No vision changes or diplopia.   ENT:  No sore throat or difficulty swallowing.  RESPIRATORY:  No cough, shortness of breath, hemoptysis, or wheezing.  CARDIOVASCULAR:  No chest pain, palpitations, orthopnea, or dyspnea on  "exertion.  GASTROINTESTINAL: Intermittent abdominal pain.  Overall improved.  No significant bowel changes  GENITOURINARY:  No dysuria or hematuria.  MUSCULOSKELETAL:  No joint pain, swelling, or erythema.  NEUROLOGIC:  No dizziness, loss of consciousness, or seizures.  PSYCHIATRIC:  No depression, anxiety, or mood changes.    Vitals:    01/20/20 0828   BP: 113/85   Pulse: 97   Resp: 16   Temp: 97.4 °F (36.3 °C)   TempSrc: Oral   SpO2: 98%   Weight: 101 kg (223 lb)   Height: 183 cm (72.05\")   PainSc: 0-No pain       PHYSICAL EXAMINATION:  GENERAL:  Well-developed well-nourished male; awake, alert and oriented, in no acute distress.  SKIN:  Warm and dry, without rashes, purpura, or petechiae.  Several nevi on his forehead which are benign in appearance  HEAD:  Normocephalic, atraumatic.  EARS:  Hearing intact.  NOSE:  Septum midline.  No excoriations or nasal discharge.  MOUTH:  No stomatitis or ulcers.  Lips are normal.  THROAT:  Oropharynx without lesions or exudates.  LYMPHATICS:  No cervical, supraclavicular, axillary, or inguinal lymphadenopathy.  CHEST:  Lungs are clear to auscultation bilaterally.  No wheezes, rales, or rhonchi.  Left subclavian Mediport which is benign in appearance.  HEART:  Regular rate; normal rhythm.  No murmurs, gallops or rubs.  ABDOMEN:  Soft, no tenderness to palpation.  Non-distended.  Normal active bowel sounds.  No organomegaly.  EXTREMITIES:  No clubbing, cyanosis, or edema.  NEUROLOGICAL:  No focal neurologic deficits.  Exam unchanged from prior except as updated    DIAGNOSTIC DATA:  Results for orders placed or performed in visit on 01/20/20   Comprehensive metabolic panel   Result Value Ref Range    Glucose 141 (H) 74 - 124 mg/dL    BUN 10 6 - 20 mg/dL    Creatinine 0.92 0.70 - 1.30 mg/dL    Sodium 139 134 - 145 mmol/L    Potassium 4.3 3.5 - 4.7 mmol/L    Chloride 102 98 - 107 mmol/L    CO2 24.7 22.0 - 29.0 mmol/L    Calcium 9.2 8.5 - 10.2 mg/dL    Total Protein 7.7 6.3 - 8.0 " g/dL    Albumin 3.60 3.50 - 5.20 g/dL    ALT (SGPT) 22 0 - 41 U/L    AST (SGOT) 21 0 - 40 U/L    Alkaline Phosphatase 127 (H) 38 - 116 U/L    Total Bilirubin 0.3 0.2 - 1.2 mg/dL    eGFR Non African Amer 87 >60 mL/min/1.73    eGFR  African Amer 106 >60 mL/min/1.73    Globulin 4.1 (H) 1.8 - 3.5 gm/dL    A/G Ratio 0.9 (L) 1.1 - 2.4 g/dL    BUN/Creatinine Ratio 10.9 7.3 - 30.0    Anion Gap 12.3 5.0 - 15.0 mmol/L   Urinalysis without microscopic (no culture) - Urine, Clean Catch   Result Value Ref Range    Color, UA Yellow Yellow, Straw    Appearance, UA Clear Clear    pH, UA 5.5 4.5 - 8.0    Specific Gravity, UA >=1.030 1.002 - 1.030    Glucose, UA Negative Negative    Ketones, UA Negative Negative    Bilirubin, UA Negative Negative    Blood, UA Negative Negative    Protein, UA Negative Negative    Leuk Esterase, UA Trace (A) Negative    Nitrite, UA Negative Negative    Urobilinogen, UA 0.2 E.U./dL 0.2 - 1.0 E.U./dL   CBC Auto Differential   Result Value Ref Range    WBC 6.31 3.40 - 10.80 10*3/mm3    RBC 5.08 4.14 - 5.80 10*6/mm3    Hemoglobin 13.3 13.0 - 17.7 g/dL    Hematocrit 42.8 37.5 - 51.0 %    MCV 84.3 79.0 - 97.0 fL    MCH 26.2 (L) 26.6 - 33.0 pg    MCHC 31.1 (L) 31.5 - 35.7 g/dL    RDW 18.9 (H) 12.3 - 15.4 %    RDW-SD 55.9 (H) 37.0 - 54.0 fl    MPV 8.8 6.0 - 12.0 fL    Platelets 308 140 - 450 10*3/mm3    Neutrophil % 39.2 (L) 42.7 - 76.0 %    Lymphocyte % 44.7 19.6 - 45.3 %    Monocyte % 6.8 5.0 - 12.0 %    Eosinophil % 7.8 (H) 0.3 - 6.2 %    Basophil % 1.3 0.0 - 1.5 %    Immature Grans % 0.2 0.0 - 0.5 %    Neutrophils, Absolute 2.48 1.70 - 7.00 10*3/mm3    Lymphocytes, Absolute 2.82 0.70 - 3.10 10*3/mm3    Monocytes, Absolute 0.43 0.10 - 0.90 10*3/mm3    Eosinophils, Absolute 0.49 (H) 0.00 - 0.40 10*3/mm3    Basophils, Absolute 0.08 0.00 - 0.20 10*3/mm3    Immature Grans, Absolute 0.01 0.00 - 0.05 10*3/mm3    nRBC 0.0 0.0 - 0.2 /100 WBC       IMAGING: None reviewed today    ASSESSMENT:  This is a 49 y.o. male  with:  1.  Metastatic colon cancer: Extensive metastatic disease in the liver at diagnosis.  No other metastatic disease.  There is a 2.7 cm hypermetabolic lesion in the sigmoid colon on his PET imaging which is likely the primary tumor.  He did have a colonoscopy at the [facility] recently and we are working to get these results.  Mismatch repair intact.  K-lynnette mutation positive so he is not a candidate for Erbitux/vectibix.  BRAF mutation negative.  NRAS mutation negative.  HER-2 and MET negative by FISH.  TRK negative.  He was referred to Dr. Painting to discuss surgical options.  We have elected to proceed with chemotherapy followed by repeat imaging and then he will be reassessed to see if he is a surgical candidate or if there are any other local therapy options with respect to the liver.  We initially elected for palliative therapy with FOLFIRI.      Genetic testing with two VUS (variants of uncertain significance), one in [facility] and one in NBN.     CT with progression after four cycle of FOLFIRI.  CEA also increased significantly.    Neutropenic on 12/30/2019.    Cycle 1 FOLFOX + frank on 1/6/2020.      Cycle #2 today    2.  Cancer related pain: He was having worsening pain on oxycodone and therefore this was switched to immediate release morphine.His pain has been reasonably well controlled and he is only required a few doses of the MSIR.    He is not having any pain at this time.    3.  Chemotherapy-induced neutropenia.  Blood counts have improved and he is not neutropenic.    4.  Tachycardia: Heart rate is a little higher than his previous resting rate.  Continue to monitor.    5.  Benign-appearing nevi on his forehead: He states he is are increasing in number.  Continue to monitor.  Refer to dermatology for appropriate but not necessary at this time.  New issue to me today.    PLAN:  1. Cycle 2 FOLFOX + frank today at the same doses  2. Nurse practitioner visit in 2, 4, and 6 weeks with  treatment.  3. CT chest abdomen and pelvis in 7 weeks and I will see him back in 8 weeks for follow-up to review imaging and hopefully to proceed with further therapy at that time.

## 2020-01-22 ENCOUNTER — INFUSION (OUTPATIENT)
Dept: ONCOLOGY | Facility: HOSPITAL | Age: 50
End: 2020-01-22

## 2020-01-22 DIAGNOSIS — C18.7 PRIMARY MALIGNANT NEOPLASM OF SIGMOID COLON (HCC): Primary | ICD-10-CM

## 2020-01-22 DIAGNOSIS — Z45.2 ENCOUNTER FOR FITTING AND ADJUSTMENT OF VASCULAR CATHETER: ICD-10-CM

## 2020-01-22 PROCEDURE — 25010000003 HEPARIN LOCK FLUCH PER 10 UNITS: Performed by: INTERNAL MEDICINE

## 2020-01-22 RX ORDER — SODIUM CHLORIDE 0.9 % (FLUSH) 0.9 %
10 SYRINGE (ML) INJECTION AS NEEDED
Status: CANCELLED | OUTPATIENT
Start: 2020-01-22

## 2020-01-22 RX ORDER — HEPARIN SODIUM (PORCINE) LOCK FLUSH IV SOLN 100 UNIT/ML 100 UNIT/ML
500 SOLUTION INTRAVENOUS AS NEEDED
Status: DISCONTINUED | OUTPATIENT
Start: 2020-01-22 | End: 2020-01-22 | Stop reason: HOSPADM

## 2020-01-22 RX ORDER — HEPARIN SODIUM (PORCINE) LOCK FLUSH IV SOLN 100 UNIT/ML 100 UNIT/ML
500 SOLUTION INTRAVENOUS AS NEEDED
Status: CANCELLED | OUTPATIENT
Start: 2020-01-22

## 2020-01-22 RX ORDER — SODIUM CHLORIDE 0.9 % (FLUSH) 0.9 %
10 SYRINGE (ML) INJECTION AS NEEDED
Status: DISCONTINUED | OUTPATIENT
Start: 2020-01-22 | End: 2020-01-22 | Stop reason: HOSPADM

## 2020-01-22 RX ADMIN — SODIUM CHLORIDE, PRESERVATIVE FREE 500 UNITS: 5 INJECTION INTRAVENOUS at 12:41

## 2020-01-22 RX ADMIN — Medication 10 ML: at 12:40

## 2020-02-03 ENCOUNTER — OFFICE VISIT (OUTPATIENT)
Dept: ONCOLOGY | Facility: CLINIC | Age: 50
End: 2020-02-03

## 2020-02-03 ENCOUNTER — INFUSION (OUTPATIENT)
Dept: ONCOLOGY | Facility: HOSPITAL | Age: 50
End: 2020-02-03

## 2020-02-03 VITALS
WEIGHT: 228.3 LBS | SYSTOLIC BLOOD PRESSURE: 146 MMHG | HEART RATE: 93 BPM | TEMPERATURE: 93 F | HEIGHT: 72 IN | DIASTOLIC BLOOD PRESSURE: 91 MMHG | OXYGEN SATURATION: 97 % | RESPIRATION RATE: 16 BRPM | BODY MASS INDEX: 30.92 KG/M2

## 2020-02-03 DIAGNOSIS — C18.7 PRIMARY MALIGNANT NEOPLASM OF SIGMOID COLON (HCC): ICD-10-CM

## 2020-02-03 DIAGNOSIS — C18.7 PRIMARY MALIGNANT NEOPLASM OF SIGMOID COLON (HCC): Primary | ICD-10-CM

## 2020-02-03 LAB
ALBUMIN SERPL-MCNC: 3.7 G/DL (ref 3.5–5.2)
ALBUMIN/GLOB SERPL: 1.1 G/DL (ref 1.1–2.4)
ALP SERPL-CCNC: 113 U/L (ref 38–116)
ALT SERPL W P-5'-P-CCNC: 17 U/L (ref 0–41)
ANION GAP SERPL CALCULATED.3IONS-SCNC: 13.6 MMOL/L (ref 5–15)
AST SERPL-CCNC: 21 U/L (ref 0–40)
BASOPHILS # BLD AUTO: 0.06 10*3/MM3 (ref 0–0.2)
BASOPHILS NFR BLD AUTO: 1.3 % (ref 0–1.5)
BILIRUB SERPL-MCNC: 0.4 MG/DL (ref 0.2–1.2)
BILIRUB UR QL STRIP: NEGATIVE
BUN BLD-MCNC: 9 MG/DL (ref 6–20)
BUN/CREAT SERPL: 11.1 (ref 7.3–30)
CALCIUM SPEC-SCNC: 9.1 MG/DL (ref 8.5–10.2)
CEA SERPL-MCNC: 4339 NG/ML
CHLORIDE SERPL-SCNC: 105 MMOL/L (ref 98–107)
CLARITY UR: CLEAR
CO2 SERPL-SCNC: 22.4 MMOL/L (ref 22–29)
COLOR UR: YELLOW
CREAT BLD-MCNC: 0.81 MG/DL (ref 0.7–1.3)
DEPRECATED RDW RBC AUTO: 58.6 FL (ref 37–54)
EOSINOPHIL # BLD AUTO: 0.2 10*3/MM3 (ref 0–0.4)
EOSINOPHIL NFR BLD AUTO: 4.4 % (ref 0.3–6.2)
ERYTHROCYTE [DISTWIDTH] IN BLOOD BY AUTOMATED COUNT: 20.1 % (ref 12.3–15.4)
GFR SERPL CREATININE-BSD FRML MDRD: 101 ML/MIN/1.73
GFR SERPL CREATININE-BSD FRML MDRD: 123 ML/MIN/1.73
GLOBULIN UR ELPH-MCNC: 3.4 GM/DL (ref 1.8–3.5)
GLUCOSE BLD-MCNC: 149 MG/DL (ref 74–124)
GLUCOSE UR STRIP-MCNC: NEGATIVE MG/DL
HCT VFR BLD AUTO: 41.6 % (ref 37.5–51)
HGB BLD-MCNC: 13.3 G/DL (ref 13–17.7)
HGB UR QL STRIP.AUTO: NEGATIVE
IMM GRANULOCYTES # BLD AUTO: 0.01 10*3/MM3 (ref 0–0.05)
IMM GRANULOCYTES NFR BLD AUTO: 0.2 % (ref 0–0.5)
KETONES UR QL STRIP: NEGATIVE
LEUKOCYTE ESTERASE UR QL STRIP.AUTO: NEGATIVE
LYMPHOCYTES # BLD AUTO: 2.13 10*3/MM3 (ref 0.7–3.1)
LYMPHOCYTES NFR BLD AUTO: 46.5 % (ref 19.6–45.3)
MCH RBC QN AUTO: 26.9 PG (ref 26.6–33)
MCHC RBC AUTO-ENTMCNC: 32 G/DL (ref 31.5–35.7)
MCV RBC AUTO: 84.2 FL (ref 79–97)
MONOCYTES # BLD AUTO: 0.52 10*3/MM3 (ref 0.1–0.9)
MONOCYTES NFR BLD AUTO: 11.4 % (ref 5–12)
NEUTROPHILS # BLD AUTO: 1.66 10*3/MM3 (ref 1.7–7)
NEUTROPHILS NFR BLD AUTO: 36.2 % (ref 42.7–76)
NITRITE UR QL STRIP: NEGATIVE
NRBC BLD AUTO-RTO: 0 /100 WBC (ref 0–0.2)
PH UR STRIP.AUTO: 7 [PH] (ref 4.5–8)
PLATELET # BLD AUTO: 161 10*3/MM3 (ref 140–450)
PMV BLD AUTO: 9.4 FL (ref 6–12)
POTASSIUM BLD-SCNC: 3.9 MMOL/L (ref 3.5–4.7)
PROT SERPL-MCNC: 7.1 G/DL (ref 6.3–8)
PROT UR QL STRIP: ABNORMAL
RBC # BLD AUTO: 4.94 10*6/MM3 (ref 4.14–5.8)
SODIUM BLD-SCNC: 141 MMOL/L (ref 134–145)
SP GR UR STRIP: 1.02 (ref 1–1.03)
UROBILINOGEN UR QL STRIP: ABNORMAL
WBC NRBC COR # BLD: 4.58 10*3/MM3 (ref 3.4–10.8)

## 2020-02-03 PROCEDURE — 99213 OFFICE O/P EST LOW 20 MIN: CPT | Performed by: NURSE PRACTITIONER

## 2020-02-03 PROCEDURE — 96415 CHEMO IV INFUSION ADDL HR: CPT

## 2020-02-03 PROCEDURE — 25010000002 BEVACIZUMAB-AWWB 400 MG/16ML SOLUTION 16 ML VIAL: Performed by: INTERNAL MEDICINE

## 2020-02-03 PROCEDURE — 25010000002 BEVACIZUMAB-AWWB 100 MG/4ML SOLUTION 4 ML VIAL: Performed by: INTERNAL MEDICINE

## 2020-02-03 PROCEDURE — 96416 CHEMO PROLONG INFUSE W/PUMP: CPT

## 2020-02-03 PROCEDURE — 96411 CHEMO IV PUSH ADDL DRUG: CPT

## 2020-02-03 PROCEDURE — 96417 CHEMO IV INFUS EACH ADDL SEQ: CPT

## 2020-02-03 PROCEDURE — 82378 CARCINOEMBRYONIC ANTIGEN: CPT | Performed by: INTERNAL MEDICINE

## 2020-02-03 PROCEDURE — 85025 COMPLETE CBC W/AUTO DIFF WBC: CPT

## 2020-02-03 PROCEDURE — 25010000002 FLUOROURACIL PER 500 MG: Performed by: INTERNAL MEDICINE

## 2020-02-03 PROCEDURE — 96413 CHEMO IV INFUSION 1 HR: CPT

## 2020-02-03 PROCEDURE — 25010000002 PALONOSETRON PER 25 MCG: Performed by: INTERNAL MEDICINE

## 2020-02-03 PROCEDURE — 25010000002 OXALIPLATIN PER 0.5 MG: Performed by: INTERNAL MEDICINE

## 2020-02-03 PROCEDURE — 80053 COMPREHEN METABOLIC PANEL: CPT

## 2020-02-03 PROCEDURE — 96368 THER/DIAG CONCURRENT INF: CPT

## 2020-02-03 PROCEDURE — 25010000002 LEUCOVORIN CALCIUM PER 50 MG: Performed by: INTERNAL MEDICINE

## 2020-02-03 PROCEDURE — 81003 URINALYSIS AUTO W/O SCOPE: CPT

## 2020-02-03 PROCEDURE — 96375 TX/PRO/DX INJ NEW DRUG ADDON: CPT

## 2020-02-03 PROCEDURE — 25010000002 DEXAMETHASONE SODIUM PHOSPHATE 100 MG/10ML SOLUTION: Performed by: INTERNAL MEDICINE

## 2020-02-03 RX ORDER — FLUOROURACIL 50 MG/ML
400 INJECTION, SOLUTION INTRAVENOUS ONCE
Status: COMPLETED | OUTPATIENT
Start: 2020-02-03 | End: 2020-02-03

## 2020-02-03 RX ORDER — SODIUM CHLORIDE 9 MG/ML
250 INJECTION, SOLUTION INTRAVENOUS ONCE
Status: COMPLETED | OUTPATIENT
Start: 2020-02-03 | End: 2020-02-03

## 2020-02-03 RX ORDER — PALONOSETRON 0.05 MG/ML
0.25 INJECTION, SOLUTION INTRAVENOUS ONCE
Status: COMPLETED | OUTPATIENT
Start: 2020-02-03 | End: 2020-02-03

## 2020-02-03 RX ORDER — DEXTROSE MONOHYDRATE 50 MG/ML
250 INJECTION, SOLUTION INTRAVENOUS ONCE
Status: COMPLETED | OUTPATIENT
Start: 2020-02-03 | End: 2020-02-03

## 2020-02-03 RX ADMIN — SODIUM CHLORIDE 250 ML: 9 INJECTION, SOLUTION INTRAVENOUS at 09:11

## 2020-02-03 RX ADMIN — OXALIPLATIN 190 MG: 5 INJECTION, SOLUTION, CONCENTRATE INTRAVENOUS at 11:06

## 2020-02-03 RX ADMIN — PALONOSETRON 0.25 MG: 0.05 INJECTION, SOLUTION INTRAVENOUS at 09:11

## 2020-02-03 RX ADMIN — BEVACIZUMAB-AWWB 500 MG: 400 INJECTION, SOLUTION INTRAVENOUS at 09:54

## 2020-02-03 RX ADMIN — DEXAMETHASONE SODIUM PHOSPHATE 12 MG: 10 INJECTION, SOLUTION INTRAMUSCULAR; INTRAVENOUS at 09:11

## 2020-02-03 RX ADMIN — DEXTROSE MONOHYDRATE 250 ML: 5 INJECTION, SOLUTION INTRAVENOUS at 11:05

## 2020-02-03 RX ADMIN — FLUOROURACIL 900 MG: 50 INJECTION, SOLUTION INTRAVENOUS at 13:13

## 2020-02-03 RX ADMIN — LEUCOVORIN CALCIUM 900 MG: 350 INJECTION, POWDER, LYOPHILIZED, FOR SOLUTION INTRAMUSCULAR; INTRAVENOUS at 11:06

## 2020-02-03 RX ADMIN — FLUOROURACIL 5400 MG: 50 INJECTION, SOLUTION INTRAVENOUS at 13:13

## 2020-02-05 ENCOUNTER — INFUSION (OUTPATIENT)
Dept: ONCOLOGY | Facility: HOSPITAL | Age: 50
End: 2020-02-05

## 2020-02-05 DIAGNOSIS — Z45.2 ENCOUNTER FOR FITTING AND ADJUSTMENT OF VASCULAR CATHETER: ICD-10-CM

## 2020-02-05 DIAGNOSIS — C18.7 PRIMARY MALIGNANT NEOPLASM OF SIGMOID COLON (HCC): Primary | ICD-10-CM

## 2020-02-05 PROCEDURE — 25010000003 HEPARIN LOCK FLUCH PER 10 UNITS: Performed by: INTERNAL MEDICINE

## 2020-02-05 RX ORDER — HEPARIN SODIUM (PORCINE) LOCK FLUSH IV SOLN 100 UNIT/ML 100 UNIT/ML
500 SOLUTION INTRAVENOUS AS NEEDED
Status: DISCONTINUED | OUTPATIENT
Start: 2020-02-05 | End: 2020-02-05 | Stop reason: HOSPADM

## 2020-02-05 RX ORDER — SODIUM CHLORIDE 0.9 % (FLUSH) 0.9 %
10 SYRINGE (ML) INJECTION AS NEEDED
Status: DISCONTINUED | OUTPATIENT
Start: 2020-02-05 | End: 2020-02-05 | Stop reason: HOSPADM

## 2020-02-05 RX ORDER — HEPARIN SODIUM (PORCINE) LOCK FLUSH IV SOLN 100 UNIT/ML 100 UNIT/ML
500 SOLUTION INTRAVENOUS AS NEEDED
Status: CANCELLED | OUTPATIENT
Start: 2020-02-05

## 2020-02-05 RX ORDER — SODIUM CHLORIDE 0.9 % (FLUSH) 0.9 %
10 SYRINGE (ML) INJECTION AS NEEDED
Status: CANCELLED | OUTPATIENT
Start: 2020-02-05

## 2020-02-05 RX ADMIN — Medication 500 UNITS: at 11:47

## 2020-02-05 RX ADMIN — SODIUM CHLORIDE, PRESERVATIVE FREE 10 ML: 5 INJECTION INTRAVENOUS at 11:47

## 2020-02-17 ENCOUNTER — OFFICE VISIT (OUTPATIENT)
Dept: ONCOLOGY | Facility: CLINIC | Age: 50
End: 2020-02-17

## 2020-02-17 ENCOUNTER — INFUSION (OUTPATIENT)
Dept: ONCOLOGY | Facility: HOSPITAL | Age: 50
End: 2020-02-17

## 2020-02-17 VITALS
HEART RATE: 84 BPM | BODY MASS INDEX: 31.52 KG/M2 | WEIGHT: 232.7 LBS | RESPIRATION RATE: 16 BRPM | OXYGEN SATURATION: 98 % | TEMPERATURE: 98 F | HEIGHT: 72 IN | DIASTOLIC BLOOD PRESSURE: 98 MMHG | SYSTOLIC BLOOD PRESSURE: 139 MMHG

## 2020-02-17 DIAGNOSIS — G89.3 CANCER ASSOCIATED PAIN: ICD-10-CM

## 2020-02-17 DIAGNOSIS — C18.7 PRIMARY MALIGNANT NEOPLASM OF SIGMOID COLON (HCC): ICD-10-CM

## 2020-02-17 DIAGNOSIS — C18.7 PRIMARY MALIGNANT NEOPLASM OF SIGMOID COLON (HCC): Primary | ICD-10-CM

## 2020-02-17 LAB
ALBUMIN SERPL-MCNC: 3.7 G/DL (ref 3.5–5.2)
ALBUMIN/GLOB SERPL: 1.2 G/DL (ref 1.1–2.4)
ALP SERPL-CCNC: 85 U/L (ref 38–116)
ALT SERPL W P-5'-P-CCNC: 14 U/L (ref 0–41)
ANION GAP SERPL CALCULATED.3IONS-SCNC: 10.9 MMOL/L (ref 5–15)
AST SERPL-CCNC: 17 U/L (ref 0–40)
BASOPHILS # BLD AUTO: 0.06 10*3/MM3 (ref 0–0.2)
BASOPHILS NFR BLD AUTO: 1.2 % (ref 0–1.5)
BILIRUB SERPL-MCNC: 0.5 MG/DL (ref 0.2–1.2)
BILIRUB UR QL STRIP: NEGATIVE
BUN BLD-MCNC: 10 MG/DL (ref 6–20)
BUN/CREAT SERPL: 10 (ref 7.3–30)
CALCIUM SPEC-SCNC: 8.9 MG/DL (ref 8.5–10.2)
CHLORIDE SERPL-SCNC: 104 MMOL/L (ref 98–107)
CLARITY UR: CLEAR
CO2 SERPL-SCNC: 27.1 MMOL/L (ref 22–29)
COLOR UR: YELLOW
CREAT BLD-MCNC: 1 MG/DL (ref 0.7–1.3)
DEPRECATED RDW RBC AUTO: 62.4 FL (ref 37–54)
EOSINOPHIL # BLD AUTO: 0.28 10*3/MM3 (ref 0–0.4)
EOSINOPHIL NFR BLD AUTO: 5.8 % (ref 0.3–6.2)
ERYTHROCYTE [DISTWIDTH] IN BLOOD BY AUTOMATED COUNT: 20.4 % (ref 12.3–15.4)
GFR SERPL CREATININE-BSD FRML MDRD: 79 ML/MIN/1.73
GFR SERPL CREATININE-BSD FRML MDRD: 96 ML/MIN/1.73
GLOBULIN UR ELPH-MCNC: 3 GM/DL (ref 1.8–3.5)
GLUCOSE BLD-MCNC: 97 MG/DL (ref 74–124)
GLUCOSE UR STRIP-MCNC: NEGATIVE MG/DL
HCT VFR BLD AUTO: 42.3 % (ref 37.5–51)
HGB BLD-MCNC: 13.4 G/DL (ref 13–17.7)
HGB UR QL STRIP.AUTO: NEGATIVE
IMM GRANULOCYTES # BLD AUTO: 0.01 10*3/MM3 (ref 0–0.05)
IMM GRANULOCYTES NFR BLD AUTO: 0.2 % (ref 0–0.5)
KETONES UR QL STRIP: NEGATIVE
LEUKOCYTE ESTERASE UR QL STRIP.AUTO: NEGATIVE
LYMPHOCYTES # BLD AUTO: 2.04 10*3/MM3 (ref 0.7–3.1)
LYMPHOCYTES NFR BLD AUTO: 42 % (ref 19.6–45.3)
MCH RBC QN AUTO: 27.1 PG (ref 26.6–33)
MCHC RBC AUTO-ENTMCNC: 31.7 G/DL (ref 31.5–35.7)
MCV RBC AUTO: 85.5 FL (ref 79–97)
MONOCYTES # BLD AUTO: 0.76 10*3/MM3 (ref 0.1–0.9)
MONOCYTES NFR BLD AUTO: 15.6 % (ref 5–12)
NEUTROPHILS # BLD AUTO: 1.71 10*3/MM3 (ref 1.7–7)
NEUTROPHILS NFR BLD AUTO: 35.2 % (ref 42.7–76)
NITRITE UR QL STRIP: NEGATIVE
NRBC BLD AUTO-RTO: 0 /100 WBC (ref 0–0.2)
PH UR STRIP.AUTO: 5.5 [PH] (ref 4.5–8)
PLATELET # BLD AUTO: 123 10*3/MM3 (ref 140–450)
PMV BLD AUTO: 9.1 FL (ref 6–12)
POTASSIUM BLD-SCNC: 4.3 MMOL/L (ref 3.5–4.7)
PROT SERPL-MCNC: 6.7 G/DL (ref 6.3–8)
PROT UR QL STRIP: NEGATIVE
RBC # BLD AUTO: 4.95 10*6/MM3 (ref 4.14–5.8)
SODIUM BLD-SCNC: 142 MMOL/L (ref 134–145)
SP GR UR STRIP: 1.02 (ref 1–1.03)
UROBILINOGEN UR QL STRIP: NORMAL
WBC NRBC COR # BLD: 4.86 10*3/MM3 (ref 3.4–10.8)

## 2020-02-17 PROCEDURE — 25010000002 OXALIPLATIN PER 0.5 MG: Performed by: INTERNAL MEDICINE

## 2020-02-17 PROCEDURE — 96413 CHEMO IV INFUSION 1 HR: CPT

## 2020-02-17 PROCEDURE — 96415 CHEMO IV INFUSION ADDL HR: CPT

## 2020-02-17 PROCEDURE — 25010000002 LEUCOVORIN CALCIUM PER 50 MG: Performed by: INTERNAL MEDICINE

## 2020-02-17 PROCEDURE — 80053 COMPREHEN METABOLIC PANEL: CPT

## 2020-02-17 PROCEDURE — 25010000002 ALTEPLASE 2 MG RECONSTITUTED SOLUTION: Performed by: NURSE PRACTITIONER

## 2020-02-17 PROCEDURE — 96375 TX/PRO/DX INJ NEW DRUG ADDON: CPT

## 2020-02-17 PROCEDURE — 96417 CHEMO IV INFUS EACH ADDL SEQ: CPT

## 2020-02-17 PROCEDURE — 85025 COMPLETE CBC W/AUTO DIFF WBC: CPT

## 2020-02-17 PROCEDURE — 96368 THER/DIAG CONCURRENT INF: CPT

## 2020-02-17 PROCEDURE — 96367 TX/PROPH/DG ADDL SEQ IV INF: CPT

## 2020-02-17 PROCEDURE — 96416 CHEMO PROLONG INFUSE W/PUMP: CPT

## 2020-02-17 PROCEDURE — 36593 DECLOT VASCULAR DEVICE: CPT

## 2020-02-17 PROCEDURE — 25010000002 PALONOSETRON PER 25 MCG: Performed by: INTERNAL MEDICINE

## 2020-02-17 PROCEDURE — 25010000002 DEXAMETHASONE SODIUM PHOSPHATE 100 MG/10ML SOLUTION: Performed by: INTERNAL MEDICINE

## 2020-02-17 PROCEDURE — 25010000002 FLUOROURACIL PER 500 MG: Performed by: INTERNAL MEDICINE

## 2020-02-17 PROCEDURE — 96411 CHEMO IV PUSH ADDL DRUG: CPT

## 2020-02-17 PROCEDURE — 25010000002 BEVACIZUMAB-AWWB 100 MG/4ML SOLUTION 4 ML VIAL: Performed by: INTERNAL MEDICINE

## 2020-02-17 PROCEDURE — 99214 OFFICE O/P EST MOD 30 MIN: CPT | Performed by: NURSE PRACTITIONER

## 2020-02-17 PROCEDURE — 25010000002 BEVACIZUMAB-AWWB 400 MG/16ML SOLUTION 16 ML VIAL: Performed by: INTERNAL MEDICINE

## 2020-02-17 PROCEDURE — 81003 URINALYSIS AUTO W/O SCOPE: CPT

## 2020-02-17 RX ORDER — FLUOROURACIL 50 MG/ML
400 INJECTION, SOLUTION INTRAVENOUS ONCE
Status: COMPLETED | OUTPATIENT
Start: 2020-02-17 | End: 2020-02-17

## 2020-02-17 RX ORDER — PALONOSETRON 0.05 MG/ML
0.25 INJECTION, SOLUTION INTRAVENOUS ONCE
Status: COMPLETED | OUTPATIENT
Start: 2020-02-17 | End: 2020-02-17

## 2020-02-17 RX ORDER — DEXTROSE MONOHYDRATE 50 MG/ML
250 INJECTION, SOLUTION INTRAVENOUS ONCE
Status: COMPLETED | OUTPATIENT
Start: 2020-02-17 | End: 2020-02-17

## 2020-02-17 RX ORDER — SODIUM CHLORIDE 9 MG/ML
250 INJECTION, SOLUTION INTRAVENOUS ONCE
Status: COMPLETED | OUTPATIENT
Start: 2020-02-17 | End: 2020-02-17

## 2020-02-17 RX ADMIN — SODIUM CHLORIDE 250 ML: 9 INJECTION, SOLUTION INTRAVENOUS at 08:57

## 2020-02-17 RX ADMIN — FLUOROURACIL 900 MG: 50 INJECTION, SOLUTION INTRAVENOUS at 12:20

## 2020-02-17 RX ADMIN — DEXTROSE 250 ML: 5 SOLUTION INTRAVENOUS at 10:06

## 2020-02-17 RX ADMIN — FLUOROURACIL 5400 MG: 50 INJECTION, SOLUTION INTRAVENOUS at 12:21

## 2020-02-17 RX ADMIN — PALONOSETRON 0.25 MG: 0.05 INJECTION, SOLUTION INTRAVENOUS at 09:03

## 2020-02-17 RX ADMIN — LEUCOVORIN CALCIUM 900 MG: 350 INJECTION, POWDER, LYOPHILIZED, FOR SOLUTION INTRAMUSCULAR; INTRAVENOUS at 10:06

## 2020-02-17 RX ADMIN — OXALIPLATIN 190 MG: 5 INJECTION, SOLUTION, CONCENTRATE INTRAVENOUS at 10:06

## 2020-02-17 RX ADMIN — ALTEPLASE: 2.2 INJECTION, POWDER, LYOPHILIZED, FOR SOLUTION INTRAVENOUS at 08:07

## 2020-02-17 RX ADMIN — DEXAMETHASONE SODIUM PHOSPHATE 12 MG: 10 INJECTION, SOLUTION INTRAMUSCULAR; INTRAVENOUS at 09:06

## 2020-02-17 RX ADMIN — BEVACIZUMAB-AWWB 500 MG: 400 INJECTION, SOLUTION INTRAVENOUS at 09:25

## 2020-02-17 NOTE — NURSING NOTE
Activase placed in port chair due to no blood return.  After appt with NP, this nurse was able to retrieve 2mL of activase,  get blood return and flushed well.

## 2020-02-17 NOTE — PROGRESS NOTES
[facility] GROUP OUTPATIENT FOLLOW UP CLINIC VISIT    REASON FOR FOLLOW-UP:    1.  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  Palliative therapy with FOLFIRI initiated on 11/4/2019.  3. Evidence of disease progression following 4 cycles of FOLFIRI.  4.  FOLFOX+bevacizumab initiated 1/6/2020    HISTORY OF PRESENT ILLNESS:  Silver Sloan is a 49 y.o. male with the above mentioned history, who returns to the office today in anticipation of FOLFOX+frank, cycle 4.    He has tolerated this regimen reasonably well.  He does have fatigue, expected cold sensitivity following chemotherapy.  He also struggles with his bowels being slowed.  He is taking a daily stool softener.  Over the past 2 weeks he has had occasional hemorrhoid bleeding with intermittent blood with wiping, though no blood in the stool.    He does report occasional nausea at night, he has not been taking antacids.  His nausea is controlled with Zofran as needed.  He denies neuropathy without cold exposure.  He denies fevers or chills, signs or symptoms of infection.  He does have intermittent right upper quadrant abdominal pain.    ONCOLOGIC HISTORY:  He lives in [facility].  He presented to [facility] for further care.  CT imaging indicated liver lesions.  He had a CT-guided liver biopsy performed on 10/14/2019 with pathology consistent with adenocarcinoma metastatic from colon.  He had a PET scan performed on 10/18/2019 which revealed a 2.7 cm mass in the sigmoid colon thought to be the primary malignancy.  The liver is enlarged measuring 22 cm.  There is a 14.3 cm mass in the right lobe of the liver with an SUV of 6.52 and an 11 cm mass in the left lobe of the liver with an SUV of 6.38.  There is a 1.5 cm lesion in the inferior right lobe of the liver with an SUV of 4.39.  There was no other evidence for metastatic disease.  There is no lymphadenopathy in the neck chest abdomen pelvis or  retroperitoneum.  Multinodular goiter with thyromegaly was visualized.  No bowel obstruction was visualized.  An umbilical hernia and a left inguinal hernia were visualized.  There is some degenerative disc disease in the lumbar spine.     He saw an oncologist in [place].  He has family that lives here in [place] and he believes that he wants to be treated here in [place].  A Mediport is scheduled in [place] on Friday.     His right upper quadrant abdominal pain is adequately controlled with oxycodone 5 mg tablets 1-2 every 6 hours as needed.  He does continue to have some sharp right lower chest pain with deep breaths.  He has nausea that is well controlled with Zofran and Phenergan.  He is having bowel movements with a bowel regimen.  He has lost about 25 pounds over the past month or so.    He was initially seen in consultation on 10/22/2019.  He wanted to see a surgical oncologist and was referred to Dr. Painting who agreed with initiating therapy with chemotherapy first with surgical resection possible at a later date.  He was referred for colonoscopy and this was done at the [facility].    He initiated palliative FOLFIRI on 11/4/2019.    Cycle #2 initiated on 11/18/2019.  Mild reaction.  We continue 50 mg of Benadryl with each cycle.    CT imaging on 12/23/2019 with progression.      Neutropenic on 12/30/2019 requiring a delay in therapy.      CEA 4399 on 10/22/2019 --> 6735 on 1/6/2020    FOLFOX+frank initiated 1/6/2020    ALLERGIES:  No Known Allergies    MEDICATIONS:  The medication list has been reviewed with the patient by the medical assistant, and the list has been updated in the electronic medical record, which I reviewed.  Medication dosages and frequencies were confirmed to be accurate.    I have reviewed the patient's medical history in detail and updated the computerized patient record.    Review of Systems   Constitutional: Positive for fatigue. Negative for appetite  "change, chills and fever.   HENT:   Negative for trouble swallowing.    Respiratory: Negative for cough and shortness of breath.    Cardiovascular: Negative for chest pain and leg swelling.   Gastrointestinal: Positive for abdominal pain (occasional in right upper quadrant.) and nausea. Negative for abdominal distention, blood in stool, constipation and diarrhea.        Hemorrhoid bleeding intermittently   Musculoskeletal: Negative for arthralgias and myalgias.   Skin: Negative for rash.   Neurological: Negative for dizziness and extremity weakness.   Hematological: Does not bruise/bleed easily.   Review of systems 02/17/2020 unchanged from previous office visit except as updated.      Vitals:    02/17/20 0801   BP: 139/98   Pulse: 84   Resp: 16   Temp: 98 °F (36.7 °C)   SpO2: 98%   Weight: 106 kg (232 lb 11.2 oz)   Height: 183 cm (72.05\")   PainSc: 0-No pain       PHYSICAL EXAMINATION:  GENERAL:  Well-developed well-nourished male; awake, alert and oriented, in no acute distress.  SKIN:  Warm and dry, without rashes, purpura, or petechiae.  Several nevi on his forehead which are benign in appearance, no additional Nevi today   HEAD:  Normocephalic, atraumatic.  EARS:  Hearing intact.  NOSE:  Septum midline.  No excoriations or nasal discharge.  MOUTH:  No stomatitis or ulcers.  Lips are normal.  THROAT:  Oropharynx without lesions or exudates.  LYMPHATICS:  No cervical, supraclavicular lymphadenopathy.  CHEST:  Lungs are clear to auscultation bilaterally.  No wheezes, rales, or rhonchi.  Left subclavian Mediport which is benign in appearance.  HEART:  Regular rate; normal rhythm.  No murmurs, gallops or rubs.  ABDOMEN:  Soft, no tenderness to palpation.  Non-distended.  Normal active bowel sounds.  No organomegaly.  EXTREMITIES:  No clubbing, cyanosis, or edema.  NEUROLOGICAL:  No focal neurologic deficits.  Physical exam 02/17/2020 unchanged from previous office visit except as updated.      DIAGNOSTIC " DATA:  Results for orders placed or performed in visit on 02/17/20   Comprehensive metabolic panel   Result Value Ref Range    Glucose 97 74 - 124 mg/dL    BUN 10 6 - 20 mg/dL    Creatinine 1.00 0.70 - 1.30 mg/dL    Sodium 142 134 - 145 mmol/L    Potassium 4.3 3.5 - 4.7 mmol/L    Chloride 104 98 - 107 mmol/L    CO2 27.1 22.0 - 29.0 mmol/L    Calcium 8.9 8.5 - 10.2 mg/dL    Total Protein 6.7 6.3 - 8.0 g/dL    Albumin 3.70 3.50 - 5.20 g/dL    ALT (SGPT) 14 0 - 41 U/L    AST (SGOT) 17 0 - 40 U/L    Alkaline Phosphatase 85 38 - 116 U/L    Total Bilirubin 0.5 0.2 - 1.2 mg/dL    eGFR Non African Amer 79 >60 mL/min/1.73    eGFR  African Amer 96 >60 mL/min/1.73    Globulin 3.0 1.8 - 3.5 gm/dL    A/G Ratio 1.2 1.1 - 2.4 g/dL    BUN/Creatinine Ratio 10.0 7.3 - 30.0    Anion Gap 10.9 5.0 - 15.0 mmol/L   Urinalysis without microscopic (no culture) - Urine, Clean Catch   Result Value Ref Range    Color, UA Yellow Yellow, Straw    Appearance, UA Clear Clear    pH, UA 5.5 4.5 - 8.0    Specific Gravity, UA 1.025 1.002 - 1.030    Glucose, UA Negative Negative    Ketones, UA Negative Negative    Bilirubin, UA Negative Negative    Blood, UA Negative Negative    Protein, UA Negative Negative    Leuk Esterase, UA Negative Negative    Nitrite, UA Negative Negative    Urobilinogen, UA 0.2 E.U./dL 0.2 - 1.0 E.U./dL   CBC Auto Differential   Result Value Ref Range    WBC 4.86 3.40 - 10.80 10*3/mm3    RBC 4.95 4.14 - 5.80 10*6/mm3    Hemoglobin 13.4 13.0 - 17.7 g/dL    Hematocrit 42.3 37.5 - 51.0 %    MCV 85.5 79.0 - 97.0 fL    MCH 27.1 26.6 - 33.0 pg    MCHC 31.7 31.5 - 35.7 g/dL    RDW 20.4 (H) 12.3 - 15.4 %    RDW-SD 62.4 (H) 37.0 - 54.0 fl    MPV 9.1 6.0 - 12.0 fL    Platelets 123 (L) 140 - 450 10*3/mm3    Neutrophil % 35.2 (L) 42.7 - 76.0 %    Lymphocyte % 42.0 19.6 - 45.3 %    Monocyte % 15.6 (H) 5.0 - 12.0 %    Eosinophil % 5.8 0.3 - 6.2 %    Basophil % 1.2 0.0 - 1.5 %    Immature Grans % 0.2 0.0 - 0.5 %    Neutrophils, Absolute 1.71  1.70 - 7.00 10*3/mm3    Lymphocytes, Absolute 2.04 0.70 - 3.10 10*3/mm3    Monocytes, Absolute 0.76 0.10 - 0.90 10*3/mm3    Eosinophils, Absolute 0.28 0.00 - 0.40 10*3/mm3    Basophils, Absolute 0.06 0.00 - 0.20 10*3/mm3    Immature Grans, Absolute 0.01 0.00 - 0.05 10*3/mm3    nRBC 0.0 0.0 - 0.2 /100 WBC       IMAGING: None reviewed today    ASSESSMENT:  This is a 49 y.o. male with:  1.  Metastatic colon cancer: Extensive metastatic disease in the liver at diagnosis.  No other metastatic disease.  There is a 2.7 cm hypermetabolic lesion in the sigmoid colon on his PET imaging which is likely the primary tumor.  [facility] and we are working to get these results.  Mismatch repair intact.  K-lynnette mutation positive so he is not a candidate for Erbitux/vectibix.  BRAF mutation negative.  NRAS mutation negative.  HER-2 and MET negative by FISH.  TRK negative.  He was referred to Dr. Painting to discuss surgical options.  We have elected to proceed with chemotherapy followed by repeat imaging and then he will be reassessed to see if he is a surgical candidate or if there are any other local therapy options with respect to the liver.  We initially elected for palliative therapy with FOLFIRI.      Genetic testing with two VUS (variants of uncertain significance), one in MSH3 and one in NBN.     CT with progression after four cycle of FOLFIRI.  CEA also increased significantly.    Neutropenic on 12/30/2019.    Cycle 1 FOLFOX + frank on 1/6/2020.  Proceed with cycle 4 today, planning repeat imaging following cycle 5    2.  Cancer related pain: He was having worsening pain on oxycodone and therefore this was switched to immediate release morphine.His pain has been reasonably well controlled and he is only required a few doses of the MSIR.    Pain is intermittent, he is not requiring MSIR at this time.     3.  Chemotherapy-induced neutropenia.    His blood counts have  recovered.  Total white blood cell count 4.58, ANC 1.66    4.  Tachycardia: Heart rate is a little higher than his previous resting rate.  Heart rate today is 84. Asymptomatic.  Continue to monitor.    5.  Benign-appearing nevi on his forehead: He states he is are increasing in number.  Continue to monitor.  This has not progressed today.  We may refer to dermatology if this continues to be an issue.    6.  Intermittent nausea.  Utilizing Zofran as needed.  This is most noticeable at night, I recommended Zantac nightly.    7.  Borderline thrombocytopenia.  Platelet count 123,000 today.  Only occasional, intermittent hemorrhoid bleeding.  No dose adjustments required for chemotherapy.    PLAN:  1. Cycle 4 FOLFOX + frank today at the same doses.  2. Begin Zantac nightly, continue Zofran as needed  3. Continue MSIR as needed  4. Continue stool softener Preparation H for hemorrhoids  5. Return in 2 weeks for nurse practitioner evaluation and cycle 5 FOLFOX + frank.  6. In 3 weeks, the patient will undergo CT of the chest, abdomen, pelvis to evaluate disease response to 5 cycles of therapy thus far  7. MD follow-up with Dr. Georges in 4 weeks for CT scan review  8. The patient understands to call for new or worsening symptoms.    Patient is on drug therapy requiring extensive monitoring     Lorraine Acosta, APRN  02/17/2020

## 2020-02-19 ENCOUNTER — INFUSION (OUTPATIENT)
Dept: ONCOLOGY | Facility: HOSPITAL | Age: 50
End: 2020-02-19

## 2020-02-19 DIAGNOSIS — Z45.2 ENCOUNTER FOR FITTING AND ADJUSTMENT OF VASCULAR CATHETER: ICD-10-CM

## 2020-02-19 DIAGNOSIS — C18.7 PRIMARY MALIGNANT NEOPLASM OF SIGMOID COLON (HCC): Primary | ICD-10-CM

## 2020-02-19 PROCEDURE — 25010000003 HEPARIN LOCK FLUCH PER 10 UNITS: Performed by: INTERNAL MEDICINE

## 2020-02-19 PROCEDURE — 96523 IRRIG DRUG DELIVERY DEVICE: CPT

## 2020-02-19 RX ORDER — HEPARIN SODIUM (PORCINE) LOCK FLUSH IV SOLN 100 UNIT/ML 100 UNIT/ML
500 SOLUTION INTRAVENOUS AS NEEDED
Status: DISCONTINUED | OUTPATIENT
Start: 2020-02-19 | End: 2020-02-19 | Stop reason: HOSPADM

## 2020-02-19 RX ORDER — SODIUM CHLORIDE 0.9 % (FLUSH) 0.9 %
10 SYRINGE (ML) INJECTION AS NEEDED
Status: CANCELLED | OUTPATIENT
Start: 2020-02-19

## 2020-02-19 RX ORDER — HEPARIN SODIUM (PORCINE) LOCK FLUSH IV SOLN 100 UNIT/ML 100 UNIT/ML
500 SOLUTION INTRAVENOUS AS NEEDED
Status: CANCELLED | OUTPATIENT
Start: 2020-02-19

## 2020-02-19 RX ORDER — SODIUM CHLORIDE 0.9 % (FLUSH) 0.9 %
10 SYRINGE (ML) INJECTION AS NEEDED
Status: DISCONTINUED | OUTPATIENT
Start: 2020-02-19 | End: 2020-02-19 | Stop reason: HOSPADM

## 2020-02-19 RX ADMIN — SODIUM CHLORIDE, PRESERVATIVE FREE 10 ML: 5 INJECTION INTRAVENOUS at 10:03

## 2020-02-19 RX ADMIN — Medication 500 UNITS: at 10:03

## 2020-03-02 ENCOUNTER — INFUSION (OUTPATIENT)
Dept: ONCOLOGY | Facility: HOSPITAL | Age: 50
End: 2020-03-02

## 2020-03-02 ENCOUNTER — OFFICE VISIT (OUTPATIENT)
Dept: ONCOLOGY | Facility: CLINIC | Age: 50
End: 2020-03-02

## 2020-03-02 VITALS
WEIGHT: 232.1 LBS | BODY MASS INDEX: 31.44 KG/M2 | TEMPERATURE: 98.1 F | DIASTOLIC BLOOD PRESSURE: 98 MMHG | HEIGHT: 72 IN | OXYGEN SATURATION: 97 % | RESPIRATION RATE: 16 BRPM | HEART RATE: 86 BPM | SYSTOLIC BLOOD PRESSURE: 149 MMHG

## 2020-03-02 VITALS — SYSTOLIC BLOOD PRESSURE: 123 MMHG | DIASTOLIC BLOOD PRESSURE: 87 MMHG | HEART RATE: 82 BPM

## 2020-03-02 DIAGNOSIS — C18.7 PRIMARY MALIGNANT NEOPLASM OF SIGMOID COLON (HCC): ICD-10-CM

## 2020-03-02 DIAGNOSIS — C18.7 PRIMARY MALIGNANT NEOPLASM OF SIGMOID COLON (HCC): Primary | ICD-10-CM

## 2020-03-02 LAB
ALBUMIN SERPL-MCNC: 4.1 G/DL (ref 3.5–5.2)
ALBUMIN/GLOB SERPL: 1.2 G/DL (ref 1.1–2.4)
ALP SERPL-CCNC: 89 U/L (ref 38–116)
ALT SERPL W P-5'-P-CCNC: 17 U/L (ref 0–41)
ANION GAP SERPL CALCULATED.3IONS-SCNC: 12.5 MMOL/L (ref 5–15)
AST SERPL-CCNC: 21 U/L (ref 0–40)
BASOPHILS # BLD AUTO: 0.04 10*3/MM3 (ref 0–0.2)
BASOPHILS NFR BLD AUTO: 0.7 % (ref 0–1.5)
BILIRUB SERPL-MCNC: 0.7 MG/DL (ref 0.2–1.2)
BILIRUB UR QL STRIP: NEGATIVE
BUN BLD-MCNC: 13 MG/DL (ref 6–20)
BUN/CREAT SERPL: 15.3 (ref 7.3–30)
CALCIUM SPEC-SCNC: 9.4 MG/DL (ref 8.5–10.2)
CEA SERPL-MCNC: 2763 NG/ML
CHLORIDE SERPL-SCNC: 103 MMOL/L (ref 98–107)
CLARITY UR: CLEAR
CO2 SERPL-SCNC: 23.5 MMOL/L (ref 22–29)
COLOR UR: YELLOW
CREAT BLD-MCNC: 0.85 MG/DL (ref 0.7–1.3)
DEPRECATED RDW RBC AUTO: 63.3 FL (ref 37–54)
EOSINOPHIL # BLD AUTO: 0.27 10*3/MM3 (ref 0–0.4)
EOSINOPHIL NFR BLD AUTO: 5 % (ref 0.3–6.2)
ERYTHROCYTE [DISTWIDTH] IN BLOOD BY AUTOMATED COUNT: 20.6 % (ref 12.3–15.4)
GFR SERPL CREATININE-BSD FRML MDRD: 116 ML/MIN/1.73
GFR SERPL CREATININE-BSD FRML MDRD: 96 ML/MIN/1.73
GLOBULIN UR ELPH-MCNC: 3.4 GM/DL (ref 1.8–3.5)
GLUCOSE BLD-MCNC: 111 MG/DL (ref 74–124)
GLUCOSE UR STRIP-MCNC: NEGATIVE MG/DL
HCT VFR BLD AUTO: 44.8 % (ref 37.5–51)
HGB BLD-MCNC: 14.5 G/DL (ref 13–17.7)
HGB UR QL STRIP.AUTO: NEGATIVE
IMM GRANULOCYTES # BLD AUTO: 0.01 10*3/MM3 (ref 0–0.05)
IMM GRANULOCYTES NFR BLD AUTO: 0.2 % (ref 0–0.5)
KETONES UR QL STRIP: NEGATIVE
LEUKOCYTE ESTERASE UR QL STRIP.AUTO: NEGATIVE
LYMPHOCYTES # BLD AUTO: 2.29 10*3/MM3 (ref 0.7–3.1)
LYMPHOCYTES NFR BLD AUTO: 42.1 % (ref 19.6–45.3)
MCH RBC QN AUTO: 27.9 PG (ref 26.6–33)
MCHC RBC AUTO-ENTMCNC: 32.4 G/DL (ref 31.5–35.7)
MCV RBC AUTO: 86.3 FL (ref 79–97)
MONOCYTES # BLD AUTO: 0.95 10*3/MM3 (ref 0.1–0.9)
MONOCYTES NFR BLD AUTO: 17.5 % (ref 5–12)
NEUTROPHILS # BLD AUTO: 1.88 10*3/MM3 (ref 1.7–7)
NEUTROPHILS NFR BLD AUTO: 34.5 % (ref 42.7–76)
NITRITE UR QL STRIP: NEGATIVE
NRBC BLD AUTO-RTO: 0 /100 WBC (ref 0–0.2)
PH UR STRIP.AUTO: 7 [PH] (ref 4.5–8)
PLATELET # BLD AUTO: 126 10*3/MM3 (ref 140–450)
PMV BLD AUTO: 10.5 FL (ref 6–12)
POTASSIUM BLD-SCNC: 3.8 MMOL/L (ref 3.5–4.7)
PROT SERPL-MCNC: 7.5 G/DL (ref 6.3–8)
PROT UR QL STRIP: ABNORMAL
RBC # BLD AUTO: 5.19 10*6/MM3 (ref 4.14–5.8)
SODIUM BLD-SCNC: 139 MMOL/L (ref 134–145)
SP GR UR STRIP: 1.02 (ref 1–1.03)
UROBILINOGEN UR QL STRIP: ABNORMAL
WBC NRBC COR # BLD: 5.44 10*3/MM3 (ref 3.4–10.8)

## 2020-03-02 PROCEDURE — 25010000002 BEVACIZUMAB-AWWB 400 MG/16ML SOLUTION 16 ML VIAL: Performed by: INTERNAL MEDICINE

## 2020-03-02 PROCEDURE — 25010000002 DEXAMETHASONE SODIUM PHOSPHATE 100 MG/10ML SOLUTION: Performed by: INTERNAL MEDICINE

## 2020-03-02 PROCEDURE — 25010000002 FLUOROURACIL PER 500 MG: Performed by: INTERNAL MEDICINE

## 2020-03-02 PROCEDURE — 96375 TX/PRO/DX INJ NEW DRUG ADDON: CPT

## 2020-03-02 PROCEDURE — 85025 COMPLETE CBC W/AUTO DIFF WBC: CPT

## 2020-03-02 PROCEDURE — 25010000002 BEVACIZUMAB-AWWB 100 MG/4ML SOLUTION 4 ML VIAL: Performed by: INTERNAL MEDICINE

## 2020-03-02 PROCEDURE — 25010000002 OXALIPLATIN PER 0.5 MG: Performed by: INTERNAL MEDICINE

## 2020-03-02 PROCEDURE — 96368 THER/DIAG CONCURRENT INF: CPT

## 2020-03-02 PROCEDURE — 96413 CHEMO IV INFUSION 1 HR: CPT

## 2020-03-02 PROCEDURE — 96417 CHEMO IV INFUS EACH ADDL SEQ: CPT

## 2020-03-02 PROCEDURE — 96416 CHEMO PROLONG INFUSE W/PUMP: CPT

## 2020-03-02 PROCEDURE — 82378 CARCINOEMBRYONIC ANTIGEN: CPT | Performed by: INTERNAL MEDICINE

## 2020-03-02 PROCEDURE — 81003 URINALYSIS AUTO W/O SCOPE: CPT

## 2020-03-02 PROCEDURE — 96415 CHEMO IV INFUSION ADDL HR: CPT

## 2020-03-02 PROCEDURE — 80053 COMPREHEN METABOLIC PANEL: CPT

## 2020-03-02 PROCEDURE — 99213 OFFICE O/P EST LOW 20 MIN: CPT | Performed by: NURSE PRACTITIONER

## 2020-03-02 PROCEDURE — 25010000002 PALONOSETRON PER 25 MCG: Performed by: INTERNAL MEDICINE

## 2020-03-02 PROCEDURE — 96411 CHEMO IV PUSH ADDL DRUG: CPT

## 2020-03-02 PROCEDURE — 25010000002 LEUCOVORIN CALCIUM PER 50 MG: Performed by: INTERNAL MEDICINE

## 2020-03-02 RX ORDER — DEXTROSE MONOHYDRATE 50 MG/ML
250 INJECTION, SOLUTION INTRAVENOUS ONCE
Status: COMPLETED | OUTPATIENT
Start: 2020-03-02 | End: 2020-03-02

## 2020-03-02 RX ORDER — PALONOSETRON 0.05 MG/ML
0.25 INJECTION, SOLUTION INTRAVENOUS ONCE
Status: COMPLETED | OUTPATIENT
Start: 2020-03-02 | End: 2020-03-02

## 2020-03-02 RX ORDER — FLUOROURACIL 50 MG/ML
400 INJECTION, SOLUTION INTRAVENOUS ONCE
Status: COMPLETED | OUTPATIENT
Start: 2020-03-02 | End: 2020-03-02

## 2020-03-02 RX ORDER — SODIUM CHLORIDE 9 MG/ML
250 INJECTION, SOLUTION INTRAVENOUS ONCE
Status: COMPLETED | OUTPATIENT
Start: 2020-03-02 | End: 2020-03-02

## 2020-03-02 RX ADMIN — OXALIPLATIN 190 MG: 100 INJECTION, SOLUTION, CONCENTRATE INTRAVENOUS at 10:22

## 2020-03-02 RX ADMIN — SODIUM CHLORIDE 250 ML: 9 INJECTION, SOLUTION INTRAVENOUS at 08:55

## 2020-03-02 RX ADMIN — DEXAMETHASONE SODIUM PHOSPHATE 12 MG: 10 INJECTION, SOLUTION INTRAMUSCULAR; INTRAVENOUS at 09:03

## 2020-03-02 RX ADMIN — DEXTROSE MONOHYDRATE 250 ML: 5 INJECTION, SOLUTION INTRAVENOUS at 10:18

## 2020-03-02 RX ADMIN — FLUOROURACIL 5400 MG: 50 INJECTION, SOLUTION INTRAVENOUS at 12:32

## 2020-03-02 RX ADMIN — BEVACIZUMAB-AWWB 500 MG: 400 INJECTION, SOLUTION INTRAVENOUS at 09:41

## 2020-03-02 RX ADMIN — FLUOROURACIL 900 MG: 50 INJECTION, SOLUTION INTRAVENOUS at 12:32

## 2020-03-02 RX ADMIN — PALONOSETRON 0.25 MG: 0.05 INJECTION, SOLUTION INTRAVENOUS at 09:02

## 2020-03-02 RX ADMIN — LEUCOVORIN CALCIUM 900 MG: 350 INJECTION, POWDER, LYOPHILIZED, FOR SOLUTION INTRAMUSCULAR; INTRAVENOUS at 10:21

## 2020-03-02 NOTE — PROGRESS NOTES
[facility] GROUP OUTPATIENT FOLLOW UP CLINIC VISIT    REASON FOR FOLLOW-UP:    1.  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  Palliative therapy with FOLFIRI initiated on 11/4/2019.  3. Evidence of disease progression following 4 cycles of FOLFIRI.  4.  FOLFOX+bevacizumab initiated 1/6/2020    HISTORY OF PRESENT ILLNESS:  Silver Sloan is a 49 y.o. male with the above mentioned history, who returns to the office today in anticipation of FOLFOX+frank, cycle 5.    He continues tolerating treatment reasonably well.  He does experience fatigue and struggles with mild constipation though he is taking a daily stool softener for relief.  He has the expected cold sensitivity related to the oxaliplatin, though this quickly wears after about 5 days.  Chemotherapy-induced nausea is well controlled with the use of Zofran.  He denies any significant upper or lower extremity neuropathies.  No infectious symptoms including fevers or chills.  No excess bleeding or bruising noted.    He has ongoing, intermittent right lower quadrant abdominal pain though this has not worsened.  He is not requiring anything for relief. Finally, he has mild issues with insomnia but has been using Nyquil-Z for relief. He has no other concerns.    ONCOLOGIC HISTORY:  He lives in [facility].  He presented to [facility] for further care.  CT imaging indicated liver lesions.  He had a CT-guided liver biopsy performed on 10/14/2019 with pathology consistent with adenocarcinoma metastatic from colon.  He had a PET scan performed on 10/18/2019 which revealed a 2.7 cm mass in the sigmoid colon thought to be the primary malignancy.  The liver is enlarged measuring 22 cm.  There is a 14.3 cm mass in the right lobe of the liver with an SUV of 6.52 and an 11 cm mass in the left lobe of the liver with an SUV of 6.38.  There is a 1.5 cm lesion in the inferior right lobe of the liver with an SUV of  4.39.  There was no other evidence for metastatic disease.  There is no lymphadenopathy in the neck chest abdomen pelvis or retroperitoneum.  Multinodular goiter with thyromegaly was visualized.  No bowel obstruction was visualized.  An umbilical hernia and a left inguinal hernia were visualized.  There is some degenerative disc disease in the lumbar spine.     He saw an oncologist in [place].  He has family that lives here in [place] and he believes that he wants to be treated here in [place].  A Mediport is scheduled in [place] on Friday.     His right upper quadrant abdominal pain is adequately controlled with oxycodone 5 mg tablets 1-2 every 6 hours as needed.  He does continue to have some sharp right lower chest pain with deep breaths.  He has nausea that is well controlled with Zofran and Phenergan.  He is having bowel movements with a bowel regimen.  He has lost about 25 pounds over the past month or so.    He was initially seen in consultation on 10/22/2019.  He wanted to see a surgical oncologist and was referred to Dr. Painting who agreed with initiating therapy with chemotherapy first with surgical resection possible at a later date.  He was referred for colonoscopy and this was done at the [facility].    He initiated palliative FOLFIRI on 11/4/2019.    Cycle #2 initiated on 11/18/2019.  Mild reaction.  We continue 50 mg of Benadryl with each cycle.    CT imaging on 12/23/2019 with progression.      Neutropenic on 12/30/2019 requiring a delay in therapy.      CEA 4399 on 10/22/2019 --> 6735 on 1/6/2020    FOLFOX+frank initiated 1/6/2020    ALLERGIES:  No Known Allergies    MEDICATIONS:  The medication list has been reviewed with the patient by the medical assistant, and the list has been updated in the electronic medical record, which I reviewed.  Medication dosages and frequencies were confirmed to be accurate.    I have reviewed the patient's medical history in detail and  updated the computerized patient record.    Review of Systems   Constitutional: Negative for appetite change, chills and fever. Fatigue: stable    HENT:   Negative for trouble swallowing.    Respiratory: Negative for cough and shortness of breath.    Cardiovascular: Negative for chest pain and leg swelling.   Gastrointestinal: Positive for abdominal pain (occasional in right upper quadrant, unchanged ) and nausea (well controlled ). Negative for abdominal distention, blood in stool, constipation and diarrhea.        Hemorrhoid bleeding intermittently, this has resolved    Musculoskeletal: Negative for arthralgias and myalgias.   Skin: Negative for rash.   Neurological: Negative for dizziness and extremity weakness.   Hematological: Does not bruise/bleed easily.   Review of systems 3/2/2020 unchanged from previous office visit except as updated.      There were no vitals filed for this visit.    PHYSICAL EXAMINATION:  GENERAL:  Well-developed well-nourished male; awake, alert and oriented, in no acute distress.  SKIN:  Warm and dry, without rashes, purpura, or petechiae.  Several nevi on his forehead which are benign in appearance, no additional Nevi today   HEAD:  Normocephalic, atraumatic.  EARS:  Hearing intact.  NOSE:  Septum midline.  No excoriations or nasal discharge.  MOUTH:  No stomatitis or ulce oral rs.  Lips are normal.  THROAT:  Oropharynx without lesions or exudates.  LYMPHATICS:  No cervical, supraclavicular lymphadenopathy.  CHEST:  Lungs are clear to auscultation bilaterally.  No wheezes, rales, or rhonchi.  Left subclavian Mediport which is benign in appearance.  HEART:  Regular rate; normal rhythm.  No murmurs, gallops or rubs.  ABDOMEN:  Soft, no tenderness to palpation.  Non-distended.  Normal active bowel sounds.  No organomegaly.  EXTREMITIES:  No clubbing, cyanosis, or edema.  NEUROLOGICAL:  No focal neurologic deficits.  Physical exam 03/1/2020 unchanged from previous office visit except as  updated.      DIAGNOSTIC DATA:  Results for orders placed or performed in visit on 02/17/20   Comprehensive metabolic panel   Result Value Ref Range    Glucose 97 74 - 124 mg/dL    BUN 10 6 - 20 mg/dL    Creatinine 1.00 0.70 - 1.30 mg/dL    Sodium 142 134 - 145 mmol/L    Potassium 4.3 3.5 - 4.7 mmol/L    Chloride 104 98 - 107 mmol/L    CO2 27.1 22.0 - 29.0 mmol/L    Calcium 8.9 8.5 - 10.2 mg/dL    Total Protein 6.7 6.3 - 8.0 g/dL    Albumin 3.70 3.50 - 5.20 g/dL    ALT (SGPT) 14 0 - 41 U/L    AST (SGOT) 17 0 - 40 U/L    Alkaline Phosphatase 85 38 - 116 U/L    Total Bilirubin 0.5 0.2 - 1.2 mg/dL    eGFR Non African Amer 79 >60 mL/min/1.73    eGFR  African Amer 96 >60 mL/min/1.73    Globulin 3.0 1.8 - 3.5 gm/dL    A/G Ratio 1.2 1.1 - 2.4 g/dL    BUN/Creatinine Ratio 10.0 7.3 - 30.0    Anion Gap 10.9 5.0 - 15.0 mmol/L   Urinalysis without microscopic (no culture) - Urine, Clean Catch   Result Value Ref Range    Color, UA Yellow Yellow, Straw    Appearance, UA Clear Clear    pH, UA 5.5 4.5 - 8.0    Specific Gravity, UA 1.025 1.002 - 1.030    Glucose, UA Negative Negative    Ketones, UA Negative Negative    Bilirubin, UA Negative Negative    Blood, UA Negative Negative    Protein, UA Negative Negative    Leuk Esterase, UA Negative Negative    Nitrite, UA Negative Negative    Urobilinogen, UA 0.2 E.U./dL 0.2 - 1.0 E.U./dL   CBC Auto Differential   Result Value Ref Range    WBC 4.86 3.40 - 10.80 10*3/mm3    RBC 4.95 4.14 - 5.80 10*6/mm3    Hemoglobin 13.4 13.0 - 17.7 g/dL    Hematocrit 42.3 37.5 - 51.0 %    MCV 85.5 79.0 - 97.0 fL    MCH 27.1 26.6 - 33.0 pg    MCHC 31.7 31.5 - 35.7 g/dL    RDW 20.4 (H) 12.3 - 15.4 %    RDW-SD 62.4 (H) 37.0 - 54.0 fl    MPV 9.1 6.0 - 12.0 fL    Platelets 123 (L) 140 - 450 10*3/mm3    Neutrophil % 35.2 (L) 42.7 - 76.0 %    Lymphocyte % 42.0 19.6 - 45.3 %    Monocyte % 15.6 (H) 5.0 - 12.0 %    Eosinophil % 5.8 0.3 - 6.2 %    Basophil % 1.2 0.0 - 1.5 %    Immature Grans % 0.2 0.0 - 0.5 %     Neutrophils, Absolute 1.71 1.70 - 7.00 10*3/mm3    Lymphocytes, Absolute 2.04 0.70 - 3.10 10*3/mm3    Monocytes, Absolute 0.76 0.10 - 0.90 10*3/mm3    Eosinophils, Absolute 0.28 0.00 - 0.40 10*3/mm3    Basophils, Absolute 0.06 0.00 - 0.20 10*3/mm3    Immature Grans, Absolute 0.01 0.00 - 0.05 10*3/mm3    nRBC 0.0 0.0 - 0.2 /100 WBC       IMAGING: None reviewed today    ASSESSMENT:  This is a 49 y.o. male with:  1.  Metastatic colon cancer: Extensive metastatic disease in the liver at diagnosis.  No other metastatic disease.  There is a 2.7 cm hypermetabolic lesion in the sigmoid colon on his PET imaging which is likely the primary tumor.  He did have a colonoscopy at the [facility] recently and we are working to get these results.  Mismatch repair intact.  K-lynnette mutation positive so he is not a candidate for Erbitux/vectibix.  BRAF mutation negative.  NRAS mutation negative.  HER-2 and MET negative by FISH.  TRK negative.  He was referred to Dr. Painting to discuss surgical options.  We have elected to proceed with chemotherapy followed by repeat imaging and then he will be reassessed to see if he is a surgical candidate or if there are any other local therapy options with respect to the liver.  We initially elected for palliative therapy with FOLFIRI.      Genetic testing with two VUS (variants of uncertain significance), one in MSH3 and one in NBN.     CT with progression after four cycle of FOLFIRI.  CEA also increased significantly.    Neutropenic on 12/30/2019.    Cycle 1 FOLFOX + frank on 1/6/2020.  Proceed with cycle 5 today. He will undergo repeat CT imaging on 3/9/20 and will see Dr. Georges on 3/16/20 for review and consideration of cycle 6 of chemotherapy.     2.  Cancer related pain: He was having worsening pain on oxycodone and therefore this was switched to immediate release morphine.His pain has been reasonably well controlled and he is only required a few doses of the MSIR.    Pain remains  intermittent, but he has not required the MSIR recently.     3.  Chemotherapy-induced neutropenia.    His blood counts have recovered.  Total white blood cell count 5.44, ANC 1.88    4.  Tachycardia: Heart rate is a little higher than his previous resting rate.  Heart rate today is 86. Asymptomatic.  Continue to monitor.    5.  Benign-appearing nevi on his forehead: He states he is are increasing in number.  Continue to monitor.  We may refer to dermatology if this continues to be an issue. This has not progressed as of today    6.  Intermittent nausea.  Utilizing Zofran as needed.  This is most noticeable at night. Zantac nightly has previously been suggested. This has been helpful??    7.  Borderline thrombocytopenia.  Platelet count 126,000 today.  Only occasional, intermittent hemorrhoid bleeding.  No dose adjustments required for chemotherapy. This is stable today. No excess bleeding     PLAN:  1. Cycle 5 FOLFOX + frank today at the same doses.  2. Continue Zantac nightly, continue Zofran as needed  3. Continue MSIR as needed  4. Continue stool softener Preparation H for hemorrhoids  5. In 1 week, the patient will undergo CT of the chest, abdomen, pelvis to evaluate disease response to 5 cycles of therapy thus far  6. MD follow-up with Dr. Georges in 2 weeks for CT scan review  7. The patient understands to call for new or worsening symptoms.    Patient is on drug therapy requiring extensive monitoring     LLOYD Carmona  3/1/2020

## 2020-03-04 ENCOUNTER — INFUSION (OUTPATIENT)
Dept: ONCOLOGY | Facility: HOSPITAL | Age: 50
End: 2020-03-04

## 2020-03-04 DIAGNOSIS — C18.7 PRIMARY MALIGNANT NEOPLASM OF SIGMOID COLON (HCC): Primary | ICD-10-CM

## 2020-03-04 DIAGNOSIS — Z45.2 ENCOUNTER FOR FITTING AND ADJUSTMENT OF VASCULAR CATHETER: ICD-10-CM

## 2020-03-04 PROCEDURE — 25010000003 HEPARIN LOCK FLUCH PER 10 UNITS: Performed by: INTERNAL MEDICINE

## 2020-03-04 RX ORDER — SODIUM CHLORIDE 0.9 % (FLUSH) 0.9 %
10 SYRINGE (ML) INJECTION AS NEEDED
Status: CANCELLED | OUTPATIENT
Start: 2020-03-04

## 2020-03-04 RX ORDER — SODIUM CHLORIDE 0.9 % (FLUSH) 0.9 %
10 SYRINGE (ML) INJECTION AS NEEDED
Status: DISCONTINUED | OUTPATIENT
Start: 2020-03-04 | End: 2020-03-04 | Stop reason: HOSPADM

## 2020-03-04 RX ORDER — HEPARIN SODIUM (PORCINE) LOCK FLUSH IV SOLN 100 UNIT/ML 100 UNIT/ML
500 SOLUTION INTRAVENOUS AS NEEDED
Status: CANCELLED | OUTPATIENT
Start: 2020-03-04

## 2020-03-04 RX ORDER — HEPARIN SODIUM (PORCINE) LOCK FLUSH IV SOLN 100 UNIT/ML 100 UNIT/ML
500 SOLUTION INTRAVENOUS AS NEEDED
Status: DISCONTINUED | OUTPATIENT
Start: 2020-03-04 | End: 2020-03-04 | Stop reason: HOSPADM

## 2020-03-04 RX ADMIN — SODIUM CHLORIDE, PRESERVATIVE FREE 500 UNITS: 5 INJECTION INTRAVENOUS at 10:47

## 2020-03-04 RX ADMIN — Medication 10 ML: at 10:47

## 2020-03-09 ENCOUNTER — HOSPITAL ENCOUNTER (OUTPATIENT)
Dept: PET IMAGING | Facility: HOSPITAL | Age: 50
Discharge: HOME | End: 2020-03-09
Admitting: INTERNAL MEDICINE

## 2020-03-09 ENCOUNTER — INFUSION (OUTPATIENT)
Dept: ONCOLOGY | Facility: HOSPITAL | Age: 50
End: 2020-03-09

## 2020-03-09 DIAGNOSIS — C18.7 PRIMARY MALIGNANT NEOPLASM OF SIGMOID COLON (HCC): ICD-10-CM

## 2020-03-09 LAB — CREAT BLDA-MCNC: 0.7 MG/DL (ref 0.6–1.3)

## 2020-03-09 PROCEDURE — 71260 CT THORAX DX C+: CPT

## 2020-03-09 PROCEDURE — 0 DIATRIZOATE MEGLUMINE & SODIUM PER 1 ML: Performed by: INTERNAL MEDICINE

## 2020-03-09 PROCEDURE — 82565 ASSAY OF CREATININE: CPT

## 2020-03-09 PROCEDURE — 96523 IRRIG DRUG DELIVERY DEVICE: CPT

## 2020-03-09 PROCEDURE — 74177 CT ABD & PELVIS W/CONTRAST: CPT

## 2020-03-09 PROCEDURE — 25010000002 IOPAMIDOL 61 % SOLUTION: Performed by: INTERNAL MEDICINE

## 2020-03-09 RX ADMIN — IOPAMIDOL 85 ML: 612 INJECTION, SOLUTION INTRAVENOUS at 10:06

## 2020-03-09 RX ADMIN — DIATRIZOATE MEGLUMINE AND DIATRIZOATE SODIUM 30 ML: 660; 100 LIQUID ORAL; RECTAL at 09:03

## 2020-03-16 ENCOUNTER — INFUSION (OUTPATIENT)
Dept: ONCOLOGY | Facility: HOSPITAL | Age: 50
End: 2020-03-16

## 2020-03-16 ENCOUNTER — OFFICE VISIT (OUTPATIENT)
Dept: ONCOLOGY | Facility: CLINIC | Age: 50
End: 2020-03-16

## 2020-03-16 VITALS
SYSTOLIC BLOOD PRESSURE: 137 MMHG | OXYGEN SATURATION: 99 % | RESPIRATION RATE: 16 BRPM | BODY MASS INDEX: 31.33 KG/M2 | WEIGHT: 231.3 LBS | TEMPERATURE: 97.6 F | HEIGHT: 72 IN | HEART RATE: 97 BPM | DIASTOLIC BLOOD PRESSURE: 92 MMHG

## 2020-03-16 DIAGNOSIS — C18.7 PRIMARY MALIGNANT NEOPLASM OF SIGMOID COLON (HCC): ICD-10-CM

## 2020-03-16 DIAGNOSIS — C18.7 PRIMARY MALIGNANT NEOPLASM OF SIGMOID COLON (HCC): Primary | ICD-10-CM

## 2020-03-16 DIAGNOSIS — G47.01 INSOMNIA DUE TO MEDICAL CONDITION: Primary | ICD-10-CM

## 2020-03-16 LAB
ALBUMIN SERPL-MCNC: 3.9 G/DL (ref 3.5–5.2)
ALBUMIN/GLOB SERPL: 1.2 G/DL (ref 1.1–2.4)
ALP SERPL-CCNC: 98 U/L (ref 38–116)
ALT SERPL W P-5'-P-CCNC: 18 U/L (ref 0–41)
ANION GAP SERPL CALCULATED.3IONS-SCNC: 12.7 MMOL/L (ref 5–15)
AST SERPL-CCNC: 19 U/L (ref 0–40)
BASOPHILS # BLD AUTO: 0.03 10*3/MM3 (ref 0–0.2)
BASOPHILS NFR BLD AUTO: 0.8 % (ref 0–1.5)
BILIRUB SERPL-MCNC: 0.5 MG/DL (ref 0.2–1.2)
BILIRUB UR QL STRIP: NEGATIVE
BUN BLD-MCNC: 14 MG/DL (ref 6–20)
BUN/CREAT SERPL: 16.9 (ref 7.3–30)
CALCIUM SPEC-SCNC: 9 MG/DL (ref 8.5–10.2)
CHLORIDE SERPL-SCNC: 103 MMOL/L (ref 98–107)
CLARITY UR: CLEAR
CO2 SERPL-SCNC: 22.3 MMOL/L (ref 22–29)
COLOR UR: YELLOW
CREAT BLD-MCNC: 0.83 MG/DL (ref 0.7–1.3)
DEPRECATED RDW RBC AUTO: 61.2 FL (ref 37–54)
EOSINOPHIL # BLD AUTO: 0.11 10*3/MM3 (ref 0–0.4)
EOSINOPHIL NFR BLD AUTO: 2.9 % (ref 0.3–6.2)
ERYTHROCYTE [DISTWIDTH] IN BLOOD BY AUTOMATED COUNT: 19.5 % (ref 12.3–15.4)
GFR SERPL CREATININE-BSD FRML MDRD: 119 ML/MIN/1.73
GFR SERPL CREATININE-BSD FRML MDRD: 98 ML/MIN/1.73
GLOBULIN UR ELPH-MCNC: 3.3 GM/DL (ref 1.8–3.5)
GLUCOSE BLD-MCNC: 132 MG/DL (ref 74–124)
GLUCOSE UR STRIP-MCNC: NEGATIVE MG/DL
HCT VFR BLD AUTO: 44.6 % (ref 37.5–51)
HGB BLD-MCNC: 14.1 G/DL (ref 13–17.7)
HGB UR QL STRIP.AUTO: NEGATIVE
IMM GRANULOCYTES # BLD AUTO: 0 10*3/MM3 (ref 0–0.05)
IMM GRANULOCYTES NFR BLD AUTO: 0 % (ref 0–0.5)
KETONES UR QL STRIP: NEGATIVE
LEUKOCYTE ESTERASE UR QL STRIP.AUTO: NEGATIVE
LYMPHOCYTES # BLD AUTO: 2.09 10*3/MM3 (ref 0.7–3.1)
LYMPHOCYTES NFR BLD AUTO: 55.6 % (ref 19.6–45.3)
MCH RBC QN AUTO: 27.7 PG (ref 26.6–33)
MCHC RBC AUTO-ENTMCNC: 31.6 G/DL (ref 31.5–35.7)
MCV RBC AUTO: 87.6 FL (ref 79–97)
MONOCYTES # BLD AUTO: 0.53 10*3/MM3 (ref 0.1–0.9)
MONOCYTES NFR BLD AUTO: 14.1 % (ref 5–12)
NEUTROPHILS # BLD AUTO: 1 10*3/MM3 (ref 1.7–7)
NEUTROPHILS NFR BLD AUTO: 26.6 % (ref 42.7–76)
NITRITE UR QL STRIP: NEGATIVE
NRBC BLD AUTO-RTO: 0 /100 WBC (ref 0–0.2)
PH UR STRIP.AUTO: 6 [PH] (ref 4.5–8)
PLATELET # BLD AUTO: 135 10*3/MM3 (ref 140–450)
PMV BLD AUTO: 9.2 FL (ref 6–12)
POTASSIUM BLD-SCNC: 3.9 MMOL/L (ref 3.5–4.7)
PROT SERPL-MCNC: 7.2 G/DL (ref 6.3–8)
PROT UR QL STRIP: NEGATIVE
RBC # BLD AUTO: 5.09 10*6/MM3 (ref 4.14–5.8)
SODIUM BLD-SCNC: 138 MMOL/L (ref 134–145)
SP GR UR STRIP: 1.02 (ref 1–1.03)
UROBILINOGEN UR QL STRIP: NORMAL
WBC NRBC COR # BLD: 3.76 10*3/MM3 (ref 3.4–10.8)

## 2020-03-16 PROCEDURE — 25010000002 PALONOSETRON PER 25 MCG: Performed by: INTERNAL MEDICINE

## 2020-03-16 PROCEDURE — 25010000002 OXALIPLATIN PER 0.5 MG: Performed by: INTERNAL MEDICINE

## 2020-03-16 PROCEDURE — 25010000002 DEXAMETHASONE SODIUM PHOSPHATE 100 MG/10ML SOLUTION: Performed by: INTERNAL MEDICINE

## 2020-03-16 PROCEDURE — 25010000002 FLUOROURACIL PER 500 MG: Performed by: INTERNAL MEDICINE

## 2020-03-16 PROCEDURE — 80053 COMPREHEN METABOLIC PANEL: CPT

## 2020-03-16 PROCEDURE — 85025 COMPLETE CBC W/AUTO DIFF WBC: CPT

## 2020-03-16 PROCEDURE — 96375 TX/PRO/DX INJ NEW DRUG ADDON: CPT

## 2020-03-16 PROCEDURE — 99215 OFFICE O/P EST HI 40 MIN: CPT | Performed by: INTERNAL MEDICINE

## 2020-03-16 PROCEDURE — 25010000002 LEUCOVORIN CALCIUM PER 50 MG: Performed by: INTERNAL MEDICINE

## 2020-03-16 PROCEDURE — 25010000002 BEVACIZUMAB-AWWB 100 MG/4ML SOLUTION 4 ML VIAL: Performed by: INTERNAL MEDICINE

## 2020-03-16 PROCEDURE — 96415 CHEMO IV INFUSION ADDL HR: CPT

## 2020-03-16 PROCEDURE — 96368 THER/DIAG CONCURRENT INF: CPT

## 2020-03-16 PROCEDURE — 25010000002 BEVACIZUMAB-AWWB 400 MG/16ML SOLUTION 16 ML VIAL: Performed by: INTERNAL MEDICINE

## 2020-03-16 PROCEDURE — 81003 URINALYSIS AUTO W/O SCOPE: CPT

## 2020-03-16 PROCEDURE — 96416 CHEMO PROLONG INFUSE W/PUMP: CPT

## 2020-03-16 PROCEDURE — 96413 CHEMO IV INFUSION 1 HR: CPT

## 2020-03-16 PROCEDURE — 96417 CHEMO IV INFUS EACH ADDL SEQ: CPT

## 2020-03-16 RX ORDER — FLUOROURACIL 50 MG/ML
400 INJECTION, SOLUTION INTRAVENOUS ONCE
Status: CANCELLED | OUTPATIENT
Start: 2020-04-20

## 2020-03-16 RX ORDER — PALONOSETRON 0.05 MG/ML
0.25 INJECTION, SOLUTION INTRAVENOUS ONCE
Status: CANCELLED | OUTPATIENT
Start: 2020-04-20

## 2020-03-16 RX ORDER — DIPHENHYDRAMINE HYDROCHLORIDE 50 MG/ML
50 INJECTION INTRAMUSCULAR; INTRAVENOUS AS NEEDED
Status: CANCELLED | OUTPATIENT
Start: 2020-03-30

## 2020-03-16 RX ORDER — PALONOSETRON 0.05 MG/ML
0.25 INJECTION, SOLUTION INTRAVENOUS ONCE
Status: COMPLETED | OUTPATIENT
Start: 2020-03-16 | End: 2020-03-16

## 2020-03-16 RX ORDER — DEXTROSE MONOHYDRATE 50 MG/ML
250 INJECTION, SOLUTION INTRAVENOUS ONCE
Status: CANCELLED | OUTPATIENT
Start: 2020-04-20

## 2020-03-16 RX ORDER — SODIUM CHLORIDE 9 MG/ML
250 INJECTION, SOLUTION INTRAVENOUS ONCE
Status: CANCELLED | OUTPATIENT
Start: 2020-03-30

## 2020-03-16 RX ORDER — DIPHENHYDRAMINE HYDROCHLORIDE 50 MG/ML
50 INJECTION INTRAMUSCULAR; INTRAVENOUS AS NEEDED
Status: CANCELLED | OUTPATIENT
Start: 2020-03-16

## 2020-03-16 RX ORDER — FLUOROURACIL 50 MG/ML
400 INJECTION, SOLUTION INTRAVENOUS ONCE
Status: COMPLETED | OUTPATIENT
Start: 2020-03-16 | End: 2020-03-16

## 2020-03-16 RX ORDER — SODIUM CHLORIDE 9 MG/ML
250 INJECTION, SOLUTION INTRAVENOUS ONCE
Status: CANCELLED | OUTPATIENT
Start: 2020-04-20

## 2020-03-16 RX ORDER — DEXTROSE MONOHYDRATE 50 MG/ML
250 INJECTION, SOLUTION INTRAVENOUS ONCE
Status: CANCELLED | OUTPATIENT
Start: 2020-03-16

## 2020-03-16 RX ORDER — FAMOTIDINE 10 MG/ML
20 INJECTION, SOLUTION INTRAVENOUS AS NEEDED
Status: CANCELLED | OUTPATIENT
Start: 2020-04-20

## 2020-03-16 RX ORDER — SODIUM CHLORIDE 9 MG/ML
250 INJECTION, SOLUTION INTRAVENOUS ONCE
Status: CANCELLED | OUTPATIENT
Start: 2020-03-16

## 2020-03-16 RX ORDER — FAMOTIDINE 10 MG/ML
20 INJECTION, SOLUTION INTRAVENOUS AS NEEDED
Status: CANCELLED | OUTPATIENT
Start: 2020-03-30

## 2020-03-16 RX ORDER — FAMOTIDINE 10 MG/ML
20 INJECTION, SOLUTION INTRAVENOUS AS NEEDED
Status: CANCELLED | OUTPATIENT
Start: 2020-03-16

## 2020-03-16 RX ORDER — TEMAZEPAM 15 MG/1
15 CAPSULE ORAL NIGHTLY PRN
Qty: 30 CAPSULE | Refills: 0 | Status: SHIPPED | OUTPATIENT
Start: 2020-03-16 | End: 2020-03-30 | Stop reason: SDUPTHER

## 2020-03-16 RX ORDER — DEXTROSE MONOHYDRATE 50 MG/ML
250 INJECTION, SOLUTION INTRAVENOUS ONCE
Status: COMPLETED | OUTPATIENT
Start: 2020-03-16 | End: 2020-03-16

## 2020-03-16 RX ORDER — PALONOSETRON 0.05 MG/ML
0.25 INJECTION, SOLUTION INTRAVENOUS ONCE
Status: CANCELLED | OUTPATIENT
Start: 2020-03-16

## 2020-03-16 RX ORDER — SERTRALINE HYDROCHLORIDE 100 MG/1
100 TABLET, FILM COATED ORAL DAILY
Qty: 90 TABLET | Refills: 3 | Status: SHIPPED | OUTPATIENT
Start: 2020-03-16 | End: 2020-03-30 | Stop reason: SDUPTHER

## 2020-03-16 RX ORDER — DEXTROSE MONOHYDRATE 50 MG/ML
250 INJECTION, SOLUTION INTRAVENOUS ONCE
Status: CANCELLED | OUTPATIENT
Start: 2020-03-30

## 2020-03-16 RX ORDER — SODIUM CHLORIDE 9 MG/ML
250 INJECTION, SOLUTION INTRAVENOUS ONCE
Status: COMPLETED | OUTPATIENT
Start: 2020-03-16 | End: 2020-03-16

## 2020-03-16 RX ORDER — FLUOROURACIL 50 MG/ML
400 INJECTION, SOLUTION INTRAVENOUS ONCE
Status: CANCELLED | OUTPATIENT
Start: 2020-03-30

## 2020-03-16 RX ORDER — PALONOSETRON 0.05 MG/ML
0.25 INJECTION, SOLUTION INTRAVENOUS ONCE
Status: CANCELLED | OUTPATIENT
Start: 2020-03-30

## 2020-03-16 RX ORDER — FLUOROURACIL 50 MG/ML
400 INJECTION, SOLUTION INTRAVENOUS ONCE
Status: CANCELLED | OUTPATIENT
Start: 2020-03-16

## 2020-03-16 RX ORDER — DIPHENHYDRAMINE HYDROCHLORIDE 50 MG/ML
50 INJECTION INTRAMUSCULAR; INTRAVENOUS AS NEEDED
Status: CANCELLED | OUTPATIENT
Start: 2020-04-20

## 2020-03-16 RX ADMIN — LEUCOVORIN CALCIUM 900 MG: 350 INJECTION, POWDER, LYOPHILIZED, FOR SOLUTION INTRAMUSCULAR; INTRAVENOUS at 10:14

## 2020-03-16 RX ADMIN — FLUOROURACIL 900 MG: 50 INJECTION, SOLUTION INTRAVENOUS at 12:21

## 2020-03-16 RX ADMIN — DEXAMETHASONE SODIUM PHOSPHATE 12 MG: 10 INJECTION, SOLUTION INTRAMUSCULAR; INTRAVENOUS at 09:02

## 2020-03-16 RX ADMIN — PALONOSETRON 0.25 MG: 0.05 INJECTION, SOLUTION INTRAVENOUS at 09:02

## 2020-03-16 RX ADMIN — OXALIPLATIN 190 MG: 5 INJECTION, SOLUTION, CONCENTRATE INTRAVENOUS at 10:14

## 2020-03-16 RX ADMIN — DEXTROSE MONOHYDRATE 250 ML: 5 INJECTION, SOLUTION INTRAVENOUS at 10:14

## 2020-03-16 RX ADMIN — FLUOROURACIL 5400 MG: 50 INJECTION, SOLUTION INTRAVENOUS at 12:21

## 2020-03-16 RX ADMIN — SODIUM CHLORIDE 250 ML: 9 INJECTION, SOLUTION INTRAVENOUS at 09:01

## 2020-03-16 RX ADMIN — BEVACIZUMAB-AWWB 500 MG: 400 INJECTION, SOLUTION INTRAVENOUS at 09:36

## 2020-03-16 NOTE — PROGRESS NOTES
[facility] GROUP OUTPATIENT FOLLOW UP CLINIC VISIT    REASON FOR FOLLOW-UP:    1.  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  Palliative therapy with FOLFIRI initiated on 11/4/2019.  3. Evidence of disease progression following 4 cycles of FOLFIRI.  4.  FOLFOX+bevacizumab initiated 1/6/2020    HISTORY OF PRESENT ILLNESS:  Silver Sloan is a 49 y.o. male who returns today for follow up of the above issue, anticipating cycle 2 of FOLFOX+frank.    He continues to tolerate FOLFOX and bevacizumab very well.  Some cold sensitivity which is a minor issue for him.  He is maintaining his weight.  Pain is not too much of an issue for him at this point and he requires pain medication about 1 time per week.  His biggest issue is worsening depression and issues with insomnia due to the inability to stop thinking about things at night.  He is continuing Zoloft 50 mg daily.  He is not really taking anything to help him sleep.  No fevers or chills.  No mucositis.  He does have some dry skin which he is moisturizing.  No rash.    ONCOLOGIC HISTORY:  He lives in [facility].  He presented to [facility] for further care.  CT imaging indicated liver lesions.  He had a CT-guided liver biopsy performed on 10/14/2019 with pathology consistent with adenocarcinoma metastatic from colon.  He had a PET scan performed on 10/18/2019 which revealed a 2.7 cm mass in the sigmoid colon thought to be the primary malignancy.  The liver is enlarged measuring 22 cm.  There is a 14.3 cm mass in the right lobe of the liver with an SUV of 6.52 and an 11 cm mass in the left lobe of the liver with an SUV of 6.38.  There is a 1.5 cm lesion in the inferior right lobe of the liver with an SUV of 4.39.  There was no other evidence for metastatic disease.  There is no lymphadenopathy in the neck chest abdomen pelvis or retroperitoneum.  Multinodular goiter with thyromegaly was visualized.  No  bowel obstruction was visualized.  An umbilical hernia and a left inguinal hernia were visualized.  There is some degenerative disc disease in the lumbar spine.     He saw an oncologist in [place].  He has family that lives here in [place] and he believes that he wants to be treated here in [place].  A Mediport is scheduled in [place] on Friday.     His right upper quadrant abdominal pain is adequately controlled with oxycodone 5 mg tablets 1-2 every 6 hours as needed.  He does continue to have some sharp right lower chest pain with deep breaths.  He has nausea that is well controlled with Zofran and Phenergan.  He is having bowel movements with a bowel regimen.  He has lost about 25 pounds over the past month or so.    He was initially seen in consultation on 10/22/2019.  He wanted to see a surgical oncologist and was referred to Dr. Painting who agreed with initiating therapy with chemotherapy first with surgical resection possible at a later date.  He was referred for colonoscopy and this was done at the [facility].    He initiated palliative FOLFIRI on 11/4/2019.    Cycle #2 initiated on 11/18/2019.  Mild reaction.  We continue 50 mg of Benadryl with each cycle.    CT imaging on 12/23/2019 with progression.      Neutropenic on 12/30/2019 requiring a delay in therapy.      CEA 4399 on 10/22/2019 --> 6735 on 1/6/2020    FOLFOX+frank initiated 1/6/2020    Scans after 5 cycles of therapy on 3/9/2020 show a good response to therapy with a decrease in the liver metastases.    Cycle #6 initiated 3/16/2020.    ALLERGIES:  No Known Allergies    MEDICATIONS:  The medication list has been reviewed with the patient by the medical assistant, and the list has been updated in the electronic medical record, which I reviewed.  Medication dosages and frequencies were confirmed to be accurate.    REVIEW OF SYSTEMS:  PAIN:  See Vital Signs below.  GENERAL:  No fevers, chills, night sweats.  Fatigue.   "Weight is stable  SKIN: Increase in nevi on his head  HEME/LYMPH:  No abnormal bleeding.  No palpable lymphadenopathy.  EYES:  No vision changes or diplopia.   ENT:  No sore throat or difficulty swallowing.  RESPIRATORY:  No cough, shortness of breath, hemoptysis, or wheezing.  CARDIOVASCULAR:  No chest pain, palpitations, orthopnea, or dyspnea on exertion.  GASTROINTESTINAL: Intermittent abdominal pain.  Overall improved.  No significant bowel changes  GENITOURINARY:  No dysuria or hematuria.  MUSCULOSKELETAL:  No joint pain, swelling, or erythema.  NEUROLOGIC:  No dizziness, loss of consciousness, or seizures.  PSYCHIATRIC:  No depression, anxiety, or mood changes.    Vitals:    03/16/20 0755   BP: 137/92   Pulse: 97   Resp: 16   Temp: 97.6 °F (36.4 °C)   TempSrc: Oral   SpO2: 99%   Weight: 105 kg (231 lb 4.8 oz)   Height: 183 cm (72.05\")   PainSc: 0-No pain  Comment: colon cancer       PHYSICAL EXAMINATION:  GENERAL:  Well-developed well-nourished male; awake, alert and oriented, in no acute distress.  SKIN:  Warm and dry, without rashes, purpura, or petechiae.  Dry skin noted on his legs.  HEAD:  Normocephalic, atraumatic.  EARS:  Hearing intact.  NOSE:  Septum midline.  No excoriations or nasal discharge.  MOUTH:  No stomatitis or ulcers.  Lips are normal.  THROAT:  Oropharynx without lesions or exudates.  CHEST:  Lungs are clear to auscultation bilaterally.  No wheezes, rales, or rhonchi.  Left subclavian Mediport which is benign in appearance.  HEART:  Regular rate; normal rhythm.  No murmurs, gallops or rubs.  ABDOMEN:  Soft, no tenderness to palpation.  Non-distended.  Normal active bowel sounds.  No organomegaly.  EXTREMITIES:  No clubbing, cyanosis, or edema.  NEUROLOGICAL:  No focal neurologic deficits.  Exam unchanged from prior except as updated    DIAGNOSTIC DATA:  Results for orders placed or performed in visit on 03/16/20   CBC Auto Differential   Result Value Ref Range    WBC 3.76 3.40 - 10.80 " 10*3/mm3    RBC 5.09 4.14 - 5.80 10*6/mm3    Hemoglobin 14.1 13.0 - 17.7 g/dL    Hematocrit 44.6 37.5 - 51.0 %    MCV 87.6 79.0 - 97.0 fL    MCH 27.7 26.6 - 33.0 pg    MCHC 31.6 31.5 - 35.7 g/dL    RDW 19.5 (H) 12.3 - 15.4 %    RDW-SD 61.2 (H) 37.0 - 54.0 fl    MPV 9.2 6.0 - 12.0 fL    Platelets 135 (L) 140 - 450 10*3/mm3    Neutrophil % 26.6 (L) 42.7 - 76.0 %    Lymphocyte % 55.6 (H) 19.6 - 45.3 %    Monocyte % 14.1 (H) 5.0 - 12.0 %    Eosinophil % 2.9 0.3 - 6.2 %    Basophil % 0.8 0.0 - 1.5 %    Immature Grans % 0.0 0.0 - 0.5 %    Neutrophils, Absolute 1.00 (L) 1.70 - 7.00 10*3/mm3    Lymphocytes, Absolute 2.09 0.70 - 3.10 10*3/mm3    Monocytes, Absolute 0.53 0.10 - 0.90 10*3/mm3    Eosinophils, Absolute 0.11 0.00 - 0.40 10*3/mm3    Basophils, Absolute 0.03 0.00 - 0.20 10*3/mm3    Immature Grans, Absolute 0.00 0.00 - 0.05 10*3/mm3    nRBC 0.0 0.0 - 0.2 /100 WBC       IMAGING: Scans after 5 cycles of therapy on 3/9/2020 show a good response to therapy with a decrease in the liver metastases.  Images personally reviewed.    ASSESSMENT:  This is a 49 y.o. male with:  1.  Metastatic colon cancer: Extensive metastatic disease in the liver at diagnosis.  No other metastatic disease.  There is a 2.7 cm hypermetabolic lesion in the sigmoid colon on his PET imaging which is likely the primary tumor.  [facility] and we are working to get these results.  Mismatch repair intact.  K-lynnette mutation positive so he is not a candidate for Erbitux/vectibix.  BRAF mutation negative.  NRAS mutation negative.  HER-2 and MET negative by FISH.  TRK negative.  He was referred to Dr. Painting to discuss surgical options.  We have elected to proceed with chemotherapy followed by repeat imaging and then he will be reassessed to see if he is a surgical candidate or if there are any other local therapy options with respect to the liver.  We initially elected for palliative therapy with  FOLFIRI.      Genetic testing with two VUS (variants of uncertain significance), one in MSH3 and one in NBN.     CT with progression after four cycle of FOLFIRI.  CEA also increased significantly.    Neutropenic on 12/30/2019.    Cycle 1 FOLFOX + frank on 1/6/2020.      Scans after 5 cycles of therapy on 3/9/2020 show a good response to therapy with a decrease in the liver metastases.    Cycle #6 initiated 3/16/2020.    2.  Cancer related pain: He was having worsening pain on oxycodone and therefore this was switched to immediate release morphine.His pain has been reasonably well controlled and he is only required a few doses of the MSIR.    He is not having any significant pain at this time and he continues pain medication as needed which has been about 1 time per week..    3.  Chemotherapy-induced neutropenia.  Absolute neutrophil count 1.0 today.  Proceed with treatment as scheduled.    4.  Tachycardia: Heart rate high normal today.    5.  Depression and insomnia: New issue discussed today.  He has been on 50 mg of Zoloft daily.  We will increase this to 100 mg.  Prescription for temazepam 15 mg nightly as needed #30 electronically sent to his pharmacy.  I advised him he can take 2 if needed but to notify us.  He can also use Benadryl if needed.    6.  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  I advised him to keep an eye out for persistent neuropathy.    PLAN:  1. Cycle 6 FOLFOX + frank today at the same doses  2. Nurse practitioner visit in 2, 4 weeks with treatment.  3. I will see him back in 6 weeks with treatment  4. Depending on the next scan results and his tolerance to chemotherapy we will need to discuss the possibility of some local therapy with Dr. Painting.

## 2020-03-18 ENCOUNTER — INFUSION (OUTPATIENT)
Dept: ONCOLOGY | Facility: HOSPITAL | Age: 50
End: 2020-03-18

## 2020-03-18 DIAGNOSIS — Z45.2 ENCOUNTER FOR FITTING AND ADJUSTMENT OF VASCULAR CATHETER: ICD-10-CM

## 2020-03-18 DIAGNOSIS — C18.7 PRIMARY MALIGNANT NEOPLASM OF SIGMOID COLON (HCC): Primary | ICD-10-CM

## 2020-03-18 PROCEDURE — 25010000003 HEPARIN LOCK FLUCH PER 10 UNITS: Performed by: INTERNAL MEDICINE

## 2020-03-18 PROCEDURE — 96523 IRRIG DRUG DELIVERY DEVICE: CPT

## 2020-03-18 RX ORDER — HEPARIN SODIUM (PORCINE) LOCK FLUSH IV SOLN 100 UNIT/ML 100 UNIT/ML
500 SOLUTION INTRAVENOUS AS NEEDED
Status: CANCELLED | OUTPATIENT
Start: 2020-03-18

## 2020-03-18 RX ORDER — SODIUM CHLORIDE 0.9 % (FLUSH) 0.9 %
10 SYRINGE (ML) INJECTION AS NEEDED
Status: DISCONTINUED | OUTPATIENT
Start: 2020-03-18 | End: 2020-03-18 | Stop reason: HOSPADM

## 2020-03-18 RX ORDER — HEPARIN SODIUM (PORCINE) LOCK FLUSH IV SOLN 100 UNIT/ML 100 UNIT/ML
500 SOLUTION INTRAVENOUS AS NEEDED
Status: DISCONTINUED | OUTPATIENT
Start: 2020-03-18 | End: 2020-03-18 | Stop reason: HOSPADM

## 2020-03-18 RX ORDER — SODIUM CHLORIDE 0.9 % (FLUSH) 0.9 %
10 SYRINGE (ML) INJECTION AS NEEDED
Status: CANCELLED | OUTPATIENT
Start: 2020-03-18

## 2020-03-18 RX ADMIN — Medication 500 UNITS: at 10:46

## 2020-03-18 RX ADMIN — SODIUM CHLORIDE, PRESERVATIVE FREE 10 ML: 5 INJECTION INTRAVENOUS at 10:46

## 2020-03-30 ENCOUNTER — INFUSION (OUTPATIENT)
Dept: ONCOLOGY | Facility: HOSPITAL | Age: 50
End: 2020-03-30

## 2020-03-30 ENCOUNTER — OFFICE VISIT (OUTPATIENT)
Dept: ONCOLOGY | Facility: CLINIC | Age: 50
End: 2020-03-30

## 2020-03-30 ENCOUNTER — TELEPHONE (OUTPATIENT)
Dept: ONCOLOGY | Facility: CLINIC | Age: 50
End: 2020-03-30

## 2020-03-30 VITALS
RESPIRATION RATE: 20 BRPM | HEART RATE: 88 BPM | BODY MASS INDEX: 31.22 KG/M2 | OXYGEN SATURATION: 99 % | DIASTOLIC BLOOD PRESSURE: 92 MMHG | TEMPERATURE: 98 F | HEIGHT: 72 IN | WEIGHT: 230.5 LBS | SYSTOLIC BLOOD PRESSURE: 128 MMHG

## 2020-03-30 DIAGNOSIS — R80.9 URINE PROTEIN INCREASED: ICD-10-CM

## 2020-03-30 DIAGNOSIS — C18.7 PRIMARY MALIGNANT NEOPLASM OF SIGMOID COLON (HCC): Primary | ICD-10-CM

## 2020-03-30 DIAGNOSIS — G47.01 INSOMNIA DUE TO MEDICAL CONDITION: ICD-10-CM

## 2020-03-30 DIAGNOSIS — T45.1X5A CHEMOTHERAPY-INDUCED THROMBOCYTOPENIA: ICD-10-CM

## 2020-03-30 DIAGNOSIS — D69.59 CHEMOTHERAPY-INDUCED THROMBOCYTOPENIA: ICD-10-CM

## 2020-03-30 DIAGNOSIS — C18.7 PRIMARY MALIGNANT NEOPLASM OF SIGMOID COLON (HCC): ICD-10-CM

## 2020-03-30 LAB
ALBUMIN SERPL-MCNC: 4 G/DL (ref 3.5–5.2)
ALBUMIN/GLOB SERPL: 1.2 G/DL (ref 1.1–2.4)
ALP SERPL-CCNC: 88 U/L (ref 38–116)
ALT SERPL W P-5'-P-CCNC: 15 U/L (ref 0–41)
ANION GAP SERPL CALCULATED.3IONS-SCNC: 13.6 MMOL/L (ref 5–15)
AST SERPL-CCNC: 18 U/L (ref 0–40)
BASOPHILS # BLD AUTO: 0.03 10*3/MM3 (ref 0–0.2)
BASOPHILS NFR BLD AUTO: 0.8 % (ref 0–1.5)
BILIRUB SERPL-MCNC: 0.4 MG/DL (ref 0.2–1.2)
BILIRUB UR QL STRIP: NEGATIVE
BUN BLD-MCNC: 11 MG/DL (ref 6–20)
BUN/CREAT SERPL: 12.9 (ref 7.3–30)
CALCIUM SPEC-SCNC: 9.2 MG/DL (ref 8.5–10.2)
CEA SERPL-MCNC: 1552 NG/ML
CHLORIDE SERPL-SCNC: 102 MMOL/L (ref 98–107)
CLARITY UR: CLEAR
CO2 SERPL-SCNC: 23.4 MMOL/L (ref 22–29)
COLOR UR: YELLOW
CREAT BLD-MCNC: 0.85 MG/DL (ref 0.7–1.3)
DEPRECATED RDW RBC AUTO: 59.1 FL (ref 37–54)
EOSINOPHIL # BLD AUTO: 0.12 10*3/MM3 (ref 0–0.4)
EOSINOPHIL NFR BLD AUTO: 3.3 % (ref 0.3–6.2)
ERYTHROCYTE [DISTWIDTH] IN BLOOD BY AUTOMATED COUNT: 19.2 % (ref 12.3–15.4)
GFR SERPL CREATININE-BSD FRML MDRD: 116 ML/MIN/1.73
GFR SERPL CREATININE-BSD FRML MDRD: 96 ML/MIN/1.73
GLOBULIN UR ELPH-MCNC: 3.3 GM/DL (ref 1.8–3.5)
GLUCOSE BLD-MCNC: 98 MG/DL (ref 74–124)
GLUCOSE UR STRIP-MCNC: NEGATIVE MG/DL
HCT VFR BLD AUTO: 43.3 % (ref 37.5–51)
HGB BLD-MCNC: 14 G/DL (ref 13–17.7)
HGB UR QL STRIP.AUTO: NEGATIVE
IMM GRANULOCYTES # BLD AUTO: 0 10*3/MM3 (ref 0–0.05)
IMM GRANULOCYTES NFR BLD AUTO: 0 % (ref 0–0.5)
KETONES UR QL STRIP: NEGATIVE
LEUKOCYTE ESTERASE UR QL STRIP.AUTO: NEGATIVE
LYMPHOCYTES # BLD AUTO: 1.82 10*3/MM3 (ref 0.7–3.1)
LYMPHOCYTES NFR BLD AUTO: 49.7 % (ref 19.6–45.3)
MCH RBC QN AUTO: 28.2 PG (ref 26.6–33)
MCHC RBC AUTO-ENTMCNC: 32.3 G/DL (ref 31.5–35.7)
MCV RBC AUTO: 87.1 FL (ref 79–97)
MONOCYTES # BLD AUTO: 0.64 10*3/MM3 (ref 0.1–0.9)
MONOCYTES NFR BLD AUTO: 17.5 % (ref 5–12)
NEUTROPHILS # BLD AUTO: 1.05 10*3/MM3 (ref 1.7–7)
NEUTROPHILS NFR BLD AUTO: 28.7 % (ref 42.7–76)
NITRITE UR QL STRIP: NEGATIVE
NRBC BLD AUTO-RTO: 0 /100 WBC (ref 0–0.2)
PH UR STRIP.AUTO: 5.5 [PH] (ref 4.5–8)
PLATELET # BLD AUTO: 104 10*3/MM3 (ref 140–450)
PMV BLD AUTO: 8.8 FL (ref 6–12)
POTASSIUM BLD-SCNC: 3.8 MMOL/L (ref 3.5–4.7)
PROT SERPL-MCNC: 7.3 G/DL (ref 6.3–8)
PROT UR QL STRIP: ABNORMAL
RBC # BLD AUTO: 4.97 10*6/MM3 (ref 4.14–5.8)
SODIUM BLD-SCNC: 139 MMOL/L (ref 134–145)
SP GR UR STRIP: >=1.03 (ref 1–1.03)
UROBILINOGEN UR QL STRIP: ABNORMAL
WBC NRBC COR # BLD: 3.66 10*3/MM3 (ref 3.4–10.8)

## 2020-03-30 PROCEDURE — 82378 CARCINOEMBRYONIC ANTIGEN: CPT | Performed by: INTERNAL MEDICINE

## 2020-03-30 PROCEDURE — 99214 OFFICE O/P EST MOD 30 MIN: CPT | Performed by: NURSE PRACTITIONER

## 2020-03-30 PROCEDURE — 25010000002 FLUOROURACIL PER 500 MG: Performed by: INTERNAL MEDICINE

## 2020-03-30 PROCEDURE — 96411 CHEMO IV PUSH ADDL DRUG: CPT

## 2020-03-30 PROCEDURE — 96416 CHEMO PROLONG INFUSE W/PUMP: CPT

## 2020-03-30 PROCEDURE — 25010000002 BEVACIZUMAB-AWWB 100 MG/4ML SOLUTION 4 ML VIAL: Performed by: INTERNAL MEDICINE

## 2020-03-30 PROCEDURE — 96417 CHEMO IV INFUS EACH ADDL SEQ: CPT

## 2020-03-30 PROCEDURE — 25010000002 PALONOSETRON PER 25 MCG: Performed by: INTERNAL MEDICINE

## 2020-03-30 PROCEDURE — 96368 THER/DIAG CONCURRENT INF: CPT

## 2020-03-30 PROCEDURE — 25010000002 OXALIPLATIN PER 0.5 MG: Performed by: INTERNAL MEDICINE

## 2020-03-30 PROCEDURE — 85025 COMPLETE CBC W/AUTO DIFF WBC: CPT

## 2020-03-30 PROCEDURE — 96413 CHEMO IV INFUSION 1 HR: CPT

## 2020-03-30 PROCEDURE — 81003 URINALYSIS AUTO W/O SCOPE: CPT

## 2020-03-30 PROCEDURE — 96375 TX/PRO/DX INJ NEW DRUG ADDON: CPT

## 2020-03-30 PROCEDURE — 96415 CHEMO IV INFUSION ADDL HR: CPT

## 2020-03-30 PROCEDURE — 25010000002 DEXAMETHASONE SODIUM PHOSPHATE 100 MG/10ML SOLUTION: Performed by: INTERNAL MEDICINE

## 2020-03-30 PROCEDURE — 25010000002 BEVACIZUMAB-AWWB 400 MG/16ML SOLUTION 16 ML VIAL: Performed by: INTERNAL MEDICINE

## 2020-03-30 PROCEDURE — 25010000002 LEUCOVORIN CALCIUM PER 50 MG: Performed by: INTERNAL MEDICINE

## 2020-03-30 PROCEDURE — 80053 COMPREHEN METABOLIC PANEL: CPT

## 2020-03-30 RX ORDER — DEXTROSE MONOHYDRATE 50 MG/ML
250 INJECTION, SOLUTION INTRAVENOUS ONCE
Status: COMPLETED | OUTPATIENT
Start: 2020-03-30 | End: 2020-03-30

## 2020-03-30 RX ORDER — SODIUM CHLORIDE 9 MG/ML
250 INJECTION, SOLUTION INTRAVENOUS ONCE
Status: COMPLETED | OUTPATIENT
Start: 2020-03-30 | End: 2020-03-30

## 2020-03-30 RX ORDER — FLUOROURACIL 50 MG/ML
400 INJECTION, SOLUTION INTRAVENOUS ONCE
Status: COMPLETED | OUTPATIENT
Start: 2020-03-30 | End: 2020-03-30

## 2020-03-30 RX ORDER — TEMAZEPAM 15 MG/1
15 CAPSULE ORAL NIGHTLY PRN
Qty: 30 CAPSULE | Refills: 0 | Status: SHIPPED | OUTPATIENT
Start: 2020-03-30 | End: 2020-04-29

## 2020-03-30 RX ORDER — PALONOSETRON 0.05 MG/ML
0.25 INJECTION, SOLUTION INTRAVENOUS ONCE
Status: COMPLETED | OUTPATIENT
Start: 2020-03-30 | End: 2020-03-30

## 2020-03-30 RX ORDER — SERTRALINE HYDROCHLORIDE 100 MG/1
100 TABLET, FILM COATED ORAL DAILY
Qty: 90 TABLET | Refills: 3 | Status: SHIPPED | OUTPATIENT
Start: 2020-03-30 | End: 2021-03-25

## 2020-03-30 RX ADMIN — DEXTROSE MONOHYDRATE 900 MG: 5 INJECTION, SOLUTION INTRAVENOUS at 12:40

## 2020-03-30 RX ADMIN — SODIUM CHLORIDE 500 MG: 9 INJECTION, SOLUTION INTRAVENOUS at 11:51

## 2020-03-30 RX ADMIN — FLUOROURACIL 900 MG: 50 INJECTION, SOLUTION INTRAVENOUS at 14:53

## 2020-03-30 RX ADMIN — FLUOROURACIL 5400 MG: 50 INJECTION, SOLUTION INTRAVENOUS at 14:54

## 2020-03-30 RX ADMIN — SODIUM CHLORIDE 100 ML: 9 INJECTION, SOLUTION INTRAVENOUS at 10:27

## 2020-03-30 RX ADMIN — PALONOSETRON 0.25 MG: 0.05 INJECTION, SOLUTION INTRAVENOUS at 10:27

## 2020-03-30 RX ADMIN — DEXAMETHASONE SODIUM PHOSPHATE 12 MG: 10 INJECTION, SOLUTION INTRAMUSCULAR; INTRAVENOUS at 10:30

## 2020-03-30 RX ADMIN — DEXTROSE MONOHYDRATE 250 ML: 5 INJECTION, SOLUTION INTRAVENOUS at 12:38

## 2020-03-30 RX ADMIN — OXALIPLATIN 190 MG: 100 INJECTION, SOLUTION INTRAVENOUS at 12:38

## 2020-03-30 NOTE — PROGRESS NOTES
[facility] GROUP OUTPATIENT FOLLOW UP CLINIC VISIT    REASON FOR FOLLOW-UP:    1.  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  Palliative therapy with FOLFIRI initiated on 11/4/2019.  3. Evidence of disease progression following 4 cycles of FOLFIRI.  4.  FOLFOX+bevacizumab initiated 1/6/2020    HISTORY OF PRESENT ILLNESS:  Silver Sloan is a 49 y.o. male who returns today for follow up of the above issue, anticipating cycle 7 of FOLFOX+frank.     He continues to tolerate chemotherapy reasonably well.  He does have fatigue with delayed recovery following treatment.  He also has numbness with cold exposure.  He denies neuropathy without cold exposure.  He reports his bowels are moving.  He denies signs or symptoms of bleeding.  He denies headaches or blurred vision.  He denies fevers or chills, signs or symptoms of infection.  He did have imaging 3/9/2020 which showed improvement in his disease state.  He is very pleased with this.  He denies any new pain.  He is taking an increased dose of Zoloft which has been helpful for his mood.  He is also utilizing temazepam nightly for sleep which has been beneficial as well.  He is requesting a refill of both his Zoloft and his temazepam today.    ONCOLOGIC HISTORY:  He lives in [facility].  He presented to [facility] for further care.  CT imaging indicated liver lesions.  He had a CT-guided liver biopsy performed on 10/14/2019 with pathology consistent with adenocarcinoma metastatic from colon.  He had a PET scan performed on 10/18/2019 which revealed a 2.7 cm mass in the sigmoid colon thought to be the primary malignancy.  The liver is enlarged measuring 22 cm.  There is a 14.3 cm mass in the right lobe of the liver with an SUV of 6.52 and an 11 cm mass in the left lobe of the liver with an SUV of 6.38.  There is a 1.5 cm lesion in the inferior right lobe of the liver with an SUV of 4.39.  There was no  other evidence for metastatic disease.  There is no lymphadenopathy in the neck chest abdomen pelvis or retroperitoneum.  Multinodular goiter with thyromegaly was visualized.  No bowel obstruction was visualized.  An umbilical hernia and a left inguinal hernia were visualized.  There is some degenerative disc disease in the lumbar spine.     He saw an oncologist in [place].  He has family that lives here in [place] and he believes that he wants to be treated here in [place].  A Mediport is scheduled in [place] on Friday.     His right upper quadrant abdominal pain is adequately controlled with oxycodone 5 mg tablets 1-2 every 6 hours as needed.  He does continue to have some sharp right lower chest pain with deep breaths.  He has nausea that is well controlled with Zofran and Phenergan.  He is having bowel movements with a bowel regimen.  He has lost about 25 pounds over the past month or so.    He was initially seen in consultation on 10/22/2019.  He wanted to see a surgical oncologist and was referred to Dr. Painting who agreed with initiating therapy with chemotherapy first with surgical resection possible at a later date.  He was referred for colonoscopy and this was done at the [facility].    He initiated palliative FOLFIRI on 11/4/2019.    Cycle #2 initiated on 11/18/2019.  Mild reaction.  We continue 50 mg of Benadryl with each cycle.    CT imaging on 12/23/2019 with progression.      Neutropenic on 12/30/2019 requiring a delay in therapy.      CEA 4399 on 10/22/2019 --> 6735 on 1/6/2020    FOLFOX+frank initiated 1/6/2020    Scans after 5 cycles of therapy on 3/9/2020 show a good response to therapy with a decrease in the liver metastases.    Cycle #6 initiated 3/16/2020.    ALLERGIES:  No Known Allergies    MEDICATIONS:  The medication list has been reviewed with the patient by the medical assistant, and the list has been updated in the electronic medical record, which I  "reviewed.  Medication dosages and frequencies were confirmed to be accurate.    I have reviewed the patient's medical history in detail and updated the computerized patient record.    Review of Systems   Constitutional: Positive for fatigue. Negative for appetite change, chills and fever.   HENT:   Negative for trouble swallowing.    Respiratory: Negative for cough and shortness of breath.    Cardiovascular: Negative for chest pain and leg swelling.   Gastrointestinal: Negative for abdominal distention, blood in stool, constipation, diarrhea and nausea.   Musculoskeletal: Negative for arthralgias and myalgias.   Skin: Negative for rash.   Neurological: Negative for dizziness and extremity weakness.   Hematological: Does not bruise/bleed easily.   Psychiatric/Behavioral: Positive for sleep disturbance.   Review of systems 03/30/2020 unchanged from previous office visit except as updated.       Vitals:    03/30/20 0918   BP: 128/92   Pulse: 88   Resp: 20   Temp: 98 °F (36.7 °C)   SpO2: 99%   Weight: 105 kg (230 lb 8 oz)   Height: 183 cm (72.05\")   PainSc: 0-No pain       PHYSICAL EXAMINATION:  GENERAL:  Well-developed well-nourished male; awake, alert and oriented, in no acute distress.  SKIN:  Warm and dry, without rashes, purpura, or petechiae.  Dry skin noted on his legs.  HEAD:  Normocephalic, atraumatic.  EARS:  Hearing intact.  CHEST:  Lungs are clear to auscultation bilaterally.  No wheezes, rales, or rhonchi.  Left subclavian Mediport which is benign in appearance.  HEART:  Regular rate; normal rhythm.  No murmurs, gallops or rubs.  ABDOMEN:  Soft, no tenderness to palpation.  Non-distended.  Normal active bowel sounds.  EXTREMITIES:  No clubbing, cyanosis, or edema.  NEUROLOGICAL:  No focal neurologic deficits.  Physical exam 03/30/2020  unchanged from previous office visit except as updated.      DIAGNOSTIC DATA:  Results for orders placed or performed in visit on 03/30/20   CEA   Result Value Ref Range    " CEA 1,552.00 ng/mL   Comprehensive metabolic panel   Result Value Ref Range    Glucose 98 74 - 124 mg/dL    BUN 11 6 - 20 mg/dL    Creatinine 0.85 0.70 - 1.30 mg/dL    Sodium 139 134 - 145 mmol/L    Potassium 3.8 3.5 - 4.7 mmol/L    Chloride 102 98 - 107 mmol/L    CO2 23.4 22.0 - 29.0 mmol/L    Calcium 9.2 8.5 - 10.2 mg/dL    Total Protein 7.3 6.3 - 8.0 g/dL    Albumin 4.00 3.50 - 5.20 g/dL    ALT (SGPT) 15 0 - 41 U/L    AST (SGOT) 18 0 - 40 U/L    Alkaline Phosphatase 88 38 - 116 U/L    Total Bilirubin 0.4 0.2 - 1.2 mg/dL    eGFR Non African Amer 96 >60 mL/min/1.73    eGFR  African Amer 116 >60 mL/min/1.73    Globulin 3.3 1.8 - 3.5 gm/dL    A/G Ratio 1.2 1.1 - 2.4 g/dL    BUN/Creatinine Ratio 12.9 7.3 - 30.0    Anion Gap 13.6 5.0 - 15.0 mmol/L   Urinalysis without microscopic (no culture) - Urine, Clean Catch   Result Value Ref Range    Color, UA Yellow Yellow, Straw    Appearance, UA Clear Clear    pH, UA 5.5 4.5 - 8.0    Specific Gravity, UA >=1.030 1.002 - 1.030    Glucose, UA Negative Negative    Ketones, UA Negative Negative    Bilirubin, UA Negative Negative    Blood, UA Negative Negative    Protein, UA 30 mg/dL (1+) (A) Negative    Leuk Esterase, UA Negative Negative    Nitrite, UA Negative Negative    Urobilinogen, UA 0.2 E.U./dL 0.2 - 1.0 E.U./dL   CBC Auto Differential   Result Value Ref Range    WBC 3.66 3.40 - 10.80 10*3/mm3    RBC 4.97 4.14 - 5.80 10*6/mm3    Hemoglobin 14.0 13.0 - 17.7 g/dL    Hematocrit 43.3 37.5 - 51.0 %    MCV 87.1 79.0 - 97.0 fL    MCH 28.2 26.6 - 33.0 pg    MCHC 32.3 31.5 - 35.7 g/dL    RDW 19.2 (H) 12.3 - 15.4 %    RDW-SD 59.1 (H) 37.0 - 54.0 fl    MPV 8.8 6.0 - 12.0 fL    Platelets 104 (L) 140 - 450 10*3/mm3    Neutrophil % 28.7 (L) 42.7 - 76.0 %    Lymphocyte % 49.7 (H) 19.6 - 45.3 %    Monocyte % 17.5 (H) 5.0 - 12.0 %    Eosinophil % 3.3 0.3 - 6.2 %    Basophil % 0.8 0.0 - 1.5 %    Immature Grans % 0.0 0.0 - 0.5 %    Neutrophils, Absolute 1.05 (L) 1.70 - 7.00 10*3/mm3     Lymphocytes, Absolute 1.82 0.70 - 3.10 10*3/mm3    Monocytes, Absolute 0.64 0.10 - 0.90 10*3/mm3    Eosinophils, Absolute 0.12 0.00 - 0.40 10*3/mm3    Basophils, Absolute 0.03 0.00 - 0.20 10*3/mm3    Immature Grans, Absolute 0.00 0.00 - 0.05 10*3/mm3    nRBC 0.0 0.0 - 0.2 /100 WBC       IMAGING: Scans after 5 cycles of therapy on 3/9/2020 show a good response to therapy with a decrease in the liver metastases.      ASSESSMENT:  This is a 49 y.o. male with:  1.  Metastatic colon cancer: Extensive metastatic disease in the liver at diagnosis.  No other metastatic disease.  There is a 2.7 cm hypermetabolic lesion in the sigmoid colon on his PET imaging which is likely the primary tumor.  He did have a colonoscopy at the [facility] recently and we are working to get these results.  Mismatch repair intact.  K-lynnette mutation positive so he is not a candidate for Erbitux/vectibix.  BRAF mutation negative.  NRAS mutation negative.  HER-2 and MET negative by FISH.  TRK negative.  He was referred to Dr. Painting to discuss surgical options.  We have elected to proceed with chemotherapy followed by repeat imaging and then he will be reassessed to see if he is a surgical candidate or if there are any other local therapy options with respect to the liver.  We initially elected for palliative therapy with FOLFIRI.      Genetic testing with two VUS (variants of uncertain significance), one in MSH3 and one in NBN.     CT with progression after four cycle of FOLFIRI.  CEA also increased significantly.    Neutropenic on 12/30/2019.    Cycle 1 FOLFOX + frank on 1/6/2020.      Scans after 5 cycles of therapy on 3/9/2020 show a good response to therapy with a decrease in the liver metastases.    Proceed with cycle 7 today.     2.  Cancer related pain: He was having worsening pain on oxycodone and therefore this was switched to immediate release morphine.His pain has been reasonably well controlled and he is only required a few  doses of the MSIR.    He is not having any significant pain at this time and he continues pain medication as needed which has been about 1 time per week.    3.  Chemotherapy-induced neutropenia.  Absolute neutrophil count 1.05 today.  Proceed with treatment as scheduled.    4.  Tachycardia: Heart rate is normal today.    5.  Depression and insomnia: Dr. Georges increased Zoloft to 100 mg.  He is in need of a refill of his Zoloft.  He is also utilizing temazepam nightly, he is in need of a refill    6.  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  He is having no issues with neuropathy without cold exposure.  We will continue to monitor.    7.  1+ urine protein.  Blood pressure within normal limits.  Continue to monitor while on Avastin.    8.  Thrombocytopenia secondary to chemotherapy.  Platelets borderline at 104,000 today.  Without signs or symptoms of bleeding. We will proceed as scheduled and continue to monitor    PLAN:  1. Cycle 7 FOLFOX + frank today at the same doses  2. Zoloft 100 mg daily electronically prescribed  3. Temazepam 15 mg nightly refilled.  4. Return in 2 weeks for CBC, CMP, nurse practitioner follow-up and cycle 8 FOLFOX + frank  5. MD follow up in 4 weeks with continued treatment.  6. Depending on the next scan results and his tolerance to chemotherapy we will need to discuss the possibility of some local therapy with Dr. Painting.    Today's plan to proceed with treatment was discussed reviewed with Dr. Georges who is in agreement.    Lorraine Acosta, APRN  03/30/2020

## 2020-03-30 NOTE — TELEPHONE ENCOUNTER
----- Message from Radha Simmons RN sent at 3/30/2020  2:55 PM EDT -----  Please make appointment for chemo unhook for Wednesday 04/01/20  At 1300  thanks

## 2020-04-01 ENCOUNTER — INFUSION (OUTPATIENT)
Dept: ONCOLOGY | Facility: HOSPITAL | Age: 50
End: 2020-04-01

## 2020-04-01 DIAGNOSIS — Z45.2 ENCOUNTER FOR FITTING AND ADJUSTMENT OF VASCULAR CATHETER: ICD-10-CM

## 2020-04-01 DIAGNOSIS — C18.7 PRIMARY MALIGNANT NEOPLASM OF SIGMOID COLON (HCC): Primary | ICD-10-CM

## 2020-04-01 PROCEDURE — 25010000003 HEPARIN LOCK FLUCH PER 10 UNITS: Performed by: INTERNAL MEDICINE

## 2020-04-01 RX ORDER — SODIUM CHLORIDE 0.9 % (FLUSH) 0.9 %
10 SYRINGE (ML) INJECTION AS NEEDED
Status: CANCELLED | OUTPATIENT
Start: 2020-04-01

## 2020-04-01 RX ORDER — HEPARIN SODIUM (PORCINE) LOCK FLUSH IV SOLN 100 UNIT/ML 100 UNIT/ML
500 SOLUTION INTRAVENOUS AS NEEDED
Status: DISCONTINUED | OUTPATIENT
Start: 2020-04-01 | End: 2020-04-01 | Stop reason: HOSPADM

## 2020-04-01 RX ORDER — SODIUM CHLORIDE 0.9 % (FLUSH) 0.9 %
10 SYRINGE (ML) INJECTION AS NEEDED
Status: DISCONTINUED | OUTPATIENT
Start: 2020-04-01 | End: 2020-04-01 | Stop reason: HOSPADM

## 2020-04-01 RX ORDER — HEPARIN SODIUM (PORCINE) LOCK FLUSH IV SOLN 100 UNIT/ML 100 UNIT/ML
500 SOLUTION INTRAVENOUS AS NEEDED
Status: CANCELLED | OUTPATIENT
Start: 2020-04-01

## 2020-04-01 RX ADMIN — Medication 500 UNITS: at 13:12

## 2020-04-01 RX ADMIN — SODIUM CHLORIDE, PRESERVATIVE FREE 10 ML: 5 INJECTION INTRAVENOUS at 13:12

## 2020-04-02 PROBLEM — F32.A DEPRESSION: Status: ACTIVE | Noted: 2020-04-02

## 2020-04-06 ENCOUNTER — TELEPHONE (OUTPATIENT)
Dept: ONCOLOGY | Facility: CLINIC | Age: 50
End: 2020-04-06

## 2020-04-12 ENCOUNTER — TELEPHONE (OUTPATIENT)
Dept: ONCOLOGY | Facility: CLINIC | Age: 50
End: 2020-04-12

## 2020-04-12 NOTE — TELEPHONE ENCOUNTER
On call note:    Patient called with sharp mid abdominal pain starting at around 10pm.  It's somewhat improved after pepcid and two morphine tablets.  Currently 6-7/10 in severity.  No nausea, vomiting, or diarrhea.  He has not been constipated.  He sounds uncomfortable on the phone.    He is on bevacizumab which puts him at risk for bowel perforation.  I advised that if his pain doesn't improve quickly that he should come to the [facility] ER for further evaluation.  He is reluctant but voiced understanding.

## 2020-04-13 ENCOUNTER — DOCUMENTATION (OUTPATIENT)
Dept: ONCOLOGY | Facility: CLINIC | Age: 50
End: 2020-04-13

## 2020-04-13 ENCOUNTER — INFUSION (OUTPATIENT)
Dept: ONCOLOGY | Facility: HOSPITAL | Age: 50
End: 2020-04-13

## 2020-04-13 ENCOUNTER — OFFICE VISIT (OUTPATIENT)
Dept: ONCOLOGY | Facility: CLINIC | Age: 50
End: 2020-04-13

## 2020-04-13 VITALS
HEART RATE: 104 BPM | RESPIRATION RATE: 16 BRPM | WEIGHT: 231 LBS | SYSTOLIC BLOOD PRESSURE: 115 MMHG | TEMPERATURE: 98 F | HEIGHT: 72 IN | BODY MASS INDEX: 31.29 KG/M2 | DIASTOLIC BLOOD PRESSURE: 87 MMHG | OXYGEN SATURATION: 99 %

## 2020-04-13 DIAGNOSIS — T45.1X5A CHEMOTHERAPY INDUCED NEUTROPENIA (HCC): ICD-10-CM

## 2020-04-13 DIAGNOSIS — C18.7 PRIMARY MALIGNANT NEOPLASM OF SIGMOID COLON (HCC): ICD-10-CM

## 2020-04-13 DIAGNOSIS — D70.1 CHEMOTHERAPY INDUCED NEUTROPENIA (HCC): ICD-10-CM

## 2020-04-13 DIAGNOSIS — C18.7 PRIMARY MALIGNANT NEOPLASM OF SIGMOID COLON (HCC): Primary | ICD-10-CM

## 2020-04-13 DIAGNOSIS — Z45.2 ENCOUNTER FOR FITTING AND ADJUSTMENT OF VASCULAR CATHETER: Primary | ICD-10-CM

## 2020-04-13 LAB
ALBUMIN SERPL-MCNC: 4.1 G/DL (ref 3.5–5.2)
ALBUMIN/GLOB SERPL: 1.1 G/DL (ref 1.1–2.4)
ALP SERPL-CCNC: 77 U/L (ref 38–116)
ALT SERPL W P-5'-P-CCNC: 16 U/L (ref 0–41)
ANION GAP SERPL CALCULATED.3IONS-SCNC: 12.9 MMOL/L (ref 5–15)
AST SERPL-CCNC: 20 U/L (ref 0–40)
BASOPHILS # BLD AUTO: 0.04 10*3/MM3 (ref 0–0.2)
BASOPHILS NFR BLD AUTO: 1.1 % (ref 0–1.5)
BILIRUB SERPL-MCNC: 0.7 MG/DL (ref 0.2–1.2)
BUN BLD-MCNC: 7 MG/DL (ref 6–20)
BUN/CREAT SERPL: 7.3 (ref 7.3–30)
CALCIUM SPEC-SCNC: 9.5 MG/DL (ref 8.5–10.2)
CHLORIDE SERPL-SCNC: 101 MMOL/L (ref 98–107)
CO2 SERPL-SCNC: 24.1 MMOL/L (ref 22–29)
CREAT BLD-MCNC: 0.96 MG/DL (ref 0.7–1.3)
DEPRECATED RDW RBC AUTO: 58.1 FL (ref 37–54)
EOSINOPHIL # BLD AUTO: 0.15 10*3/MM3 (ref 0–0.4)
EOSINOPHIL NFR BLD AUTO: 4.2 % (ref 0.3–6.2)
ERYTHROCYTE [DISTWIDTH] IN BLOOD BY AUTOMATED COUNT: 18.7 % (ref 12.3–15.4)
GFR SERPL CREATININE-BSD FRML MDRD: 101 ML/MIN/1.73
GFR SERPL CREATININE-BSD FRML MDRD: 83 ML/MIN/1.73
GLOBULIN UR ELPH-MCNC: 3.6 GM/DL (ref 1.8–3.5)
GLUCOSE BLD-MCNC: 102 MG/DL (ref 74–124)
HCT VFR BLD AUTO: 46.3 % (ref 37.5–51)
HGB BLD-MCNC: 15 G/DL (ref 13–17.7)
IMM GRANULOCYTES # BLD AUTO: 0 10*3/MM3 (ref 0–0.05)
IMM GRANULOCYTES NFR BLD AUTO: 0 % (ref 0–0.5)
LYMPHOCYTES # BLD AUTO: 2.06 10*3/MM3 (ref 0.7–3.1)
LYMPHOCYTES NFR BLD AUTO: 57.2 % (ref 19.6–45.3)
MCH RBC QN AUTO: 28.3 PG (ref 26.6–33)
MCHC RBC AUTO-ENTMCNC: 32.4 G/DL (ref 31.5–35.7)
MCV RBC AUTO: 87.4 FL (ref 79–97)
MONOCYTES # BLD AUTO: 0.64 10*3/MM3 (ref 0.1–0.9)
MONOCYTES NFR BLD AUTO: 17.8 % (ref 5–12)
NEUTROPHILS # BLD AUTO: 0.71 10*3/MM3 (ref 1.7–7)
NEUTROPHILS NFR BLD AUTO: 19.7 % (ref 42.7–76)
NRBC BLD AUTO-RTO: 0 /100 WBC (ref 0–0.2)
PLATELET # BLD AUTO: 143 10*3/MM3 (ref 140–450)
PMV BLD AUTO: 8.9 FL (ref 6–12)
POTASSIUM BLD-SCNC: 3.9 MMOL/L (ref 3.5–4.7)
PROT SERPL-MCNC: 7.7 G/DL (ref 6.3–8)
RBC # BLD AUTO: 5.3 10*6/MM3 (ref 4.14–5.8)
SODIUM BLD-SCNC: 138 MMOL/L (ref 134–145)
WBC NRBC COR # BLD: 3.6 10*3/MM3 (ref 3.4–10.8)

## 2020-04-13 PROCEDURE — 80053 COMPREHEN METABOLIC PANEL: CPT

## 2020-04-13 PROCEDURE — 85025 COMPLETE CBC W/AUTO DIFF WBC: CPT

## 2020-04-13 PROCEDURE — 36591 DRAW BLOOD OFF VENOUS DEVICE: CPT

## 2020-04-13 PROCEDURE — 99214 OFFICE O/P EST MOD 30 MIN: CPT | Performed by: NURSE PRACTITIONER

## 2020-04-13 PROCEDURE — 25010000003 HEPARIN LOCK FLUCH PER 10 UNITS: Performed by: INTERNAL MEDICINE

## 2020-04-13 PROCEDURE — 96523 IRRIG DRUG DELIVERY DEVICE: CPT

## 2020-04-13 RX ORDER — SODIUM CHLORIDE 0.9 % (FLUSH) 0.9 %
10 SYRINGE (ML) INJECTION AS NEEDED
Status: DISCONTINUED | OUTPATIENT
Start: 2020-04-13 | End: 2020-04-13 | Stop reason: HOSPADM

## 2020-04-13 RX ORDER — HEPARIN SODIUM (PORCINE) LOCK FLUSH IV SOLN 100 UNIT/ML 100 UNIT/ML
500 SOLUTION INTRAVENOUS AS NEEDED
Status: CANCELLED | OUTPATIENT
Start: 2020-04-13

## 2020-04-13 RX ORDER — SODIUM CHLORIDE 0.9 % (FLUSH) 0.9 %
10 SYRINGE (ML) INJECTION AS NEEDED
Status: CANCELLED | OUTPATIENT
Start: 2020-04-13

## 2020-04-13 RX ORDER — HEPARIN SODIUM (PORCINE) LOCK FLUSH IV SOLN 100 UNIT/ML 100 UNIT/ML
500 SOLUTION INTRAVENOUS AS NEEDED
Status: DISCONTINUED | OUTPATIENT
Start: 2020-04-13 | End: 2020-04-13 | Stop reason: HOSPADM

## 2020-04-13 RX ADMIN — Medication 500 UNITS: at 10:28

## 2020-04-13 RX ADMIN — SODIUM CHLORIDE, PRESERVATIVE FREE 10 ML: 5 INJECTION INTRAVENOUS at 10:28

## 2020-04-13 NOTE — PROGRESS NOTES
[facility] GROUP OUTPATIENT FOLLOW UP CLINIC VISIT    REASON FOR FOLLOW-UP:    1.  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  Palliative therapy with FOLFIRI initiated on 11/4/2019.  3. Evidence of disease progression following 4 cycles of FOLFIRI.  4.  FOLFOX+bevacizumab initiated 1/6/2020    HISTORY OF PRESENT ILLNESS:  Silver Sloan is a 49 y.o. male who returns today for follow up of the above issue, anticipating cycle 8 of FOLFOX+frank.      Patient reports he is quite tired today.  He denies any recent fevers, nausea, shortness of breath, or new cough.  He did have an event episode earlier this past weekend of severe abdominal pain, up to a 7 out of 10 he reports.  He did call the on-call physician which happened to be Dr. Georges and reported this discomfort.  He had taken Zantac and 2 doses of morphine with gradual improvement in his symptoms over a few hour timeframe.  Dr. Georges instructed him if this worsened for him to go to the ER.  The patient states this did not recur.  He was informed of the concerns for intestinal perforation which she is aware to watch for in the future.  Patient does report some slow bowel movements, denying constipation as they are soft but he feels like it takes a while for him to empty.  He denies trouble with his appetite currently and is able to maintain his weight.      ONCOLOGIC HISTORY:  He lives in [facility].  He presented to [facility] for further care.  CT imaging indicated liver lesions.  He had a CT-guided liver biopsy performed on 10/14/2019 with pathology consistent with adenocarcinoma metastatic from colon.  He had a PET scan performed on 10/18/2019 which revealed a 2.7 cm mass in the sigmoid colon thought to be the primary malignancy.  The liver is enlarged measuring 22 cm.  There is a 14.3 cm mass in the right lobe of the liver with an SUV of 6.52 and an 11 cm mass in the left lobe of the liver with  an SUV of 6.38.  There is a 1.5 cm lesion in the inferior right lobe of the liver with an SUV of 4.39.  There was no other evidence for metastatic disease.  There is no lymphadenopathy in the neck chest abdomen pelvis or retroperitoneum.  Multinodular goiter with thyromegaly was visualized.  No bowel obstruction was visualized.  An umbilical hernia and a left inguinal hernia were visualized.  There is some degenerative disc disease in the lumbar spine.     He saw an oncologist in [place].  He has family that lives here in [place] and he believes that he wants to be treated here in [place].  A Mediport is scheduled in [place] on Friday.     His right upper quadrant abdominal pain is adequately controlled with oxycodone 5 mg tablets 1-2 every 6 hours as needed.  He does continue to have some sharp right lower chest pain with deep breaths.  He has nausea that is well controlled with Zofran and Phenergan.  He is having bowel movements with a bowel regimen.  He has lost about 25 pounds over the past month or so.    He was initially seen in consultation on 10/22/2019.  He wanted to see a surgical oncologist and was referred to Dr. Painting who agreed with initiating therapy with chemotherapy first with surgical resection possible at a later date.  He was referred for colonoscopy and this was done at the [facility].    He initiated palliative FOLFIRI on 11/4/2019.    Cycle #2 initiated on 11/18/2019.  Mild reaction.  We continue 50 mg of Benadryl with each cycle.    CT imaging on 12/23/2019 with progression.      Neutropenic on 12/30/2019 requiring a delay in therapy.      CEA 4399 on 10/22/2019 --> 6735 on 1/6/2020    FOLFOX+frank initiated 1/6/2020    Scans after 5 cycles of therapy on 3/9/2020 show a good response to therapy with a decrease in the liver metastases.    Cycle #6 initiated 3/16/2020.    ALLERGIES:  No Known Allergies    MEDICATIONS:  The medication list has been reviewed with  "the patient by the medical assistant, and the list has been updated in the electronic medical record, which I reviewed.  Medication dosages and frequencies were confirmed to be accurate.    I have reviewed the patient's medical history in detail and updated the computerized patient record.    Review of Systems   Constitutional: Positive for fatigue. Negative for appetite change, chills and fever.   HENT:   Negative for trouble swallowing.    Respiratory: Negative for cough and shortness of breath.    Cardiovascular: Negative for chest pain and leg swelling.   Gastrointestinal: Positive for abdominal pain. Negative for abdominal distention, blood in stool, constipation, diarrhea and nausea.   Musculoskeletal: Negative for arthralgias and myalgias.   Skin: Negative for rash.   Neurological: Negative for dizziness and extremity weakness.   Hematological: Does not bruise/bleed easily.   Psychiatric/Behavioral: Positive for sleep disturbance.   Review of systems 4/13/2020 unchanged from previous office visit except as updated.       Vitals:    04/13/20 0949   BP: 115/87   Pulse: 104   Resp: 16   Temp: 98 °F (36.7 °C)   TempSrc: Oral   SpO2: 99%   Weight: 105 kg (231 lb)   Height: 183 cm (72.05\")   PainSc: 0-No pain       PHYSICAL EXAMINATION:  GENERAL:  Well-developed well-nourished male; awake, alert and oriented, in no acute distress.  SKIN:  Warm and dry, without rashes, purpura, or petechiae.  HEAD:  Normocephalic, atraumatic.  EARS:  Hearing intact.  CHEST: Normal respiratory effort.  Left subclavian Mediport which is benign in appearance.  ABDOMEN:  Soft, no tenderness to palpation.  Non-distended.  Normal active bowel sounds.  EXTREMITIES:  No clubbing, cyanosis, or edema.  NEUROLOGICAL:  No focal neurologic deficits.  Physical exam 4/13/2020 unchanged from previous office visit except as updated.      DIAGNOSTIC DATA:  Results for orders placed or performed in visit on 04/13/20   Comprehensive metabolic panel "   Result Value Ref Range    Glucose 102 74 - 124 mg/dL    BUN 7 6 - 20 mg/dL    Creatinine 0.96 0.70 - 1.30 mg/dL    Sodium 138 134 - 145 mmol/L    Potassium 3.9 3.5 - 4.7 mmol/L    Chloride 101 98 - 107 mmol/L    CO2 24.1 22.0 - 29.0 mmol/L    Calcium 9.5 8.5 - 10.2 mg/dL    Total Protein 7.7 6.3 - 8.0 g/dL    Albumin 4.10 3.50 - 5.20 g/dL    ALT (SGPT) 16 0 - 41 U/L    AST (SGOT) 20 0 - 40 U/L    Alkaline Phosphatase 77 38 - 116 U/L    Total Bilirubin 0.7 0.2 - 1.2 mg/dL    eGFR Non African Amer 83 >60 mL/min/1.73    eGFR  African Amer 101 >60 mL/min/1.73    Globulin 3.6 (H) 1.8 - 3.5 gm/dL    A/G Ratio 1.1 1.1 - 2.4 g/dL    BUN/Creatinine Ratio 7.3 7.3 - 30.0    Anion Gap 12.9 5.0 - 15.0 mmol/L   CBC Auto Differential   Result Value Ref Range    WBC 3.60 3.40 - 10.80 10*3/mm3    RBC 5.30 4.14 - 5.80 10*6/mm3    Hemoglobin 15.0 13.0 - 17.7 g/dL    Hematocrit 46.3 37.5 - 51.0 %    MCV 87.4 79.0 - 97.0 fL    MCH 28.3 26.6 - 33.0 pg    MCHC 32.4 31.5 - 35.7 g/dL    RDW 18.7 (H) 12.3 - 15.4 %    RDW-SD 58.1 (H) 37.0 - 54.0 fl    MPV 8.9 6.0 - 12.0 fL    Platelets 143 140 - 450 10*3/mm3    Neutrophil % 19.7 (L) 42.7 - 76.0 %    Lymphocyte % 57.2 (H) 19.6 - 45.3 %    Monocyte % 17.8 (H) 5.0 - 12.0 %    Eosinophil % 4.2 0.3 - 6.2 %    Basophil % 1.1 0.0 - 1.5 %    Immature Grans % 0.0 0.0 - 0.5 %    Neutrophils, Absolute 0.71 (L) 1.70 - 7.00 10*3/mm3    Lymphocytes, Absolute 2.06 0.70 - 3.10 10*3/mm3    Monocytes, Absolute 0.64 0.10 - 0.90 10*3/mm3    Eosinophils, Absolute 0.15 0.00 - 0.40 10*3/mm3    Basophils, Absolute 0.04 0.00 - 0.20 10*3/mm3    Immature Grans, Absolute 0.00 0.00 - 0.05 10*3/mm3    nRBC 0.0 0.0 - 0.2 /100 WBC       IMAGING: Scans after 5 cycles of therapy on 3/9/2020 show a good response to therapy with a decrease in the liver metastases.      ASSESSMENT:  This is a 49 y.o. male with:  1.  Metastatic colon cancer: Extensive metastatic disease in the liver at diagnosis.  No other metastatic disease.   There is a 2.7 cm hypermetabolic lesion in the sigmoid colon on his PET imaging which is likely the primary tumor.  He did have a colonoscopy at the [facility] recently and we are working to get these results.  Mismatch repair intact.  K-lynnette mutation positive so he is not a candidate for Erbitux/vectibix.  BRAF mutation negative.  NRAS mutation negative.  HER-2 and MET negative by FISH.  TRK negative.  He was referred to Dr. Painting to discuss surgical options.  We have elected to proceed with chemotherapy followed by repeat imaging and then he will be reassessed to see if he is a surgical candidate or if there are any other local therapy options with respect to the liver.  We initially elected for palliative therapy with FOLFIRI.      Genetic testing with two VUS (variants of uncertain significance), one in MSH3 and one in NBN.     CT with progression after four cycle of FOLFIRI.  CEA also increased significantly.    Neutropenic on 12/30/2019.    Cycle 1 FOLFOX + frank on 1/6/2020.      Scans after 5 cycles of therapy on 3/9/2020 show a good response to therapy with a decrease in the liver metastases.    We will hold cycle 8 today x1 week due to neutropenia.  We will obtain approval for G-CSF for the patient to begin for 3 days following disconnect.  I will try to get this approved for home use to lessen his trips to our office.  He does live with a nurse currently who could assist him with these injections.  Nursing would need to show him how to do these himself if he feels he is able at the next visit if this is approved for home use.    2.  Cancer related pain: He was having worsening pain on oxycodone and therefore this was switched to immediate release morphine.His pain has been reasonably well controlled and he is only required a few doses of the MSIR.    Patient did have an episode of abdominal pain this past weekend the lasted a few hours.  He did call into the on-call physician which happened  to be Dr. Georges.  He was instructed to the ER but did not improve due to concerns of intestinal perforation.  Fortunately after a dose of Zantac and 2 doses of morphine this did gradually improve and he has had no episode since that time.    3.  Chemotherapy-induced neutropenia.  Absolute neutrophil count 0.71 today.  Hold treatment x1 week.  We will add Xarzio 480 mcg x 3 days with each treatment.    4.  Tachycardia: Heart rate is regular today.    5.  Depression and insomnia: Dr. Georges increased Zoloft to 100 mg.  He is in need of a refill of his Zoloft.  He is also utilizing temazepam nightly, he is in need of a refill    6.  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  He is having no issues with neuropathy without cold exposure.  We will continue to monitor.    7.  1+ urine protein.  Blood pressure within normal limits.  Continue to monitor while on Avastin.    8.  Thrombocytopenia secondary to chemotherapy.  Platelet count has normalized this week.    PLAN:  1. Hold cycle 8 x 1 week  2. Obtain approval for Zarxio 480 mcg x 3 doses with each treatment  3. Return in 1 week for review by Dr. Georges with cycle 8 FOLFOX plus bevacizumab and Zarxio x3 doses.  4. Depending on the next scan results and his tolerance to chemotherapy we will need to discuss the possibility of some local therapy with Dr. Painting.    Today's plan to proceed with treatment was discussed reviewed with Dr. Georges who is in agreement.    LLOYD Enamorado

## 2020-04-13 NOTE — PROGRESS NOTES
Staff message rec from Jaye GUZMAN NP about pt needing Neupogen at home. See below.    Jaye Radford APRN [facility]   Cc: Rosalva Montemayor.             Please get 480mcg Neupogen x 3 days with each treatment, please see if they will allow from home as well      Pt has [place] medicaid. I have submitted the Neupogen PA through covermymeds.  Waiting for a decision.    Response rec from [place] [employer] stating pt needs to have tried and failed Zarxio.    Message sent to Jaye to authorize change.

## 2020-04-13 NOTE — PROGRESS NOTES
Zarxio approved from 4/13/2020-4/13/2021-[employer].    Pt must use [facility].    I have escribed the rx to [facility]

## 2020-04-14 NOTE — PROGRESS NOTES
Zarxio rx sent to [facility] pharmacy yesterday in error. I have resent this to [facility]

## 2020-04-16 ENCOUNTER — TELEPHONE (OUTPATIENT)
Dept: ONCOLOGY | Facility: CLINIC | Age: 50
End: 2020-04-16

## 2020-04-20 ENCOUNTER — INFUSION (OUTPATIENT)
Dept: ONCOLOGY | Facility: HOSPITAL | Age: 50
End: 2020-04-20

## 2020-04-20 ENCOUNTER — TELEPHONE (OUTPATIENT)
Dept: ONCOLOGY | Facility: CLINIC | Age: 50
End: 2020-04-20

## 2020-04-20 ENCOUNTER — OFFICE VISIT (OUTPATIENT)
Dept: ONCOLOGY | Facility: CLINIC | Age: 50
End: 2020-04-20

## 2020-04-20 VITALS
HEIGHT: 72 IN | WEIGHT: 230.4 LBS | HEART RATE: 110 BPM | RESPIRATION RATE: 16 BRPM | TEMPERATURE: 98.6 F | SYSTOLIC BLOOD PRESSURE: 131 MMHG | BODY MASS INDEX: 31.21 KG/M2 | DIASTOLIC BLOOD PRESSURE: 89 MMHG | OXYGEN SATURATION: 97 %

## 2020-04-20 DIAGNOSIS — C18.7 PRIMARY MALIGNANT NEOPLASM OF SIGMOID COLON (HCC): ICD-10-CM

## 2020-04-20 DIAGNOSIS — C18.7 PRIMARY MALIGNANT NEOPLASM OF SIGMOID COLON (HCC): Primary | ICD-10-CM

## 2020-04-20 LAB
ALBUMIN SERPL-MCNC: 4 G/DL (ref 3.5–5.2)
ALBUMIN/GLOB SERPL: 1.1 G/DL (ref 1.1–2.4)
ALP SERPL-CCNC: 85 U/L (ref 38–116)
ALT SERPL W P-5'-P-CCNC: 17 U/L (ref 0–41)
ANION GAP SERPL CALCULATED.3IONS-SCNC: 16.2 MMOL/L (ref 5–15)
AST SERPL-CCNC: 25 U/L (ref 0–40)
BASOPHILS # BLD AUTO: 0.06 10*3/MM3 (ref 0–0.2)
BASOPHILS NFR BLD AUTO: 1.2 % (ref 0–1.5)
BILIRUB SERPL-MCNC: 0.7 MG/DL (ref 0.2–1.2)
BILIRUB UR QL STRIP: ABNORMAL
BUN BLD-MCNC: 14 MG/DL (ref 6–20)
BUN/CREAT SERPL: 15.1 (ref 7.3–30)
CALCIUM SPEC-SCNC: 9.3 MG/DL (ref 8.5–10.2)
CEA SERPL-MCNC: 1609 NG/ML
CHLORIDE SERPL-SCNC: 100 MMOL/L (ref 98–107)
CLARITY UR: CLEAR
CO2 SERPL-SCNC: 21.8 MMOL/L (ref 22–29)
COLOR UR: YELLOW
CREAT BLD-MCNC: 0.93 MG/DL (ref 0.7–1.3)
DEPRECATED RDW RBC AUTO: 60 FL (ref 37–54)
EOSINOPHIL # BLD AUTO: 0.23 10*3/MM3 (ref 0–0.4)
EOSINOPHIL NFR BLD AUTO: 4.8 % (ref 0.3–6.2)
ERYTHROCYTE [DISTWIDTH] IN BLOOD BY AUTOMATED COUNT: 18.3 % (ref 12.3–15.4)
GFR SERPL CREATININE-BSD FRML MDRD: 105 ML/MIN/1.73
GFR SERPL CREATININE-BSD FRML MDRD: 86 ML/MIN/1.73
GLOBULIN UR ELPH-MCNC: 3.8 GM/DL (ref 1.8–3.5)
GLUCOSE BLD-MCNC: 148 MG/DL (ref 74–124)
GLUCOSE UR STRIP-MCNC: NEGATIVE MG/DL
HCT VFR BLD AUTO: 45.8 % (ref 37.5–51)
HGB BLD-MCNC: 14.7 G/DL (ref 13–17.7)
HGB UR QL STRIP.AUTO: NEGATIVE
IMM GRANULOCYTES # BLD AUTO: 0.06 10*3/MM3 (ref 0–0.05)
IMM GRANULOCYTES NFR BLD AUTO: 1.2 % (ref 0–0.5)
KETONES UR QL STRIP: NEGATIVE
LEUKOCYTE ESTERASE UR QL STRIP.AUTO: NEGATIVE
LYMPHOCYTES # BLD AUTO: 1.95 10*3/MM3 (ref 0.7–3.1)
LYMPHOCYTES NFR BLD AUTO: 40.5 % (ref 19.6–45.3)
MCH RBC QN AUTO: 28.8 PG (ref 26.6–33)
MCHC RBC AUTO-ENTMCNC: 32.1 G/DL (ref 31.5–35.7)
MCV RBC AUTO: 89.8 FL (ref 79–97)
MONOCYTES # BLD AUTO: 1.01 10*3/MM3 (ref 0.1–0.9)
MONOCYTES NFR BLD AUTO: 21 % (ref 5–12)
NEUTROPHILS # BLD AUTO: 1.5 10*3/MM3 (ref 1.7–7)
NEUTROPHILS NFR BLD AUTO: 31.3 % (ref 42.7–76)
NITRITE UR QL STRIP: NEGATIVE
NRBC BLD AUTO-RTO: 0 /100 WBC (ref 0–0.2)
PH UR STRIP.AUTO: 5.5 [PH] (ref 4.5–8)
PLATELET # BLD AUTO: 200 10*3/MM3 (ref 140–450)
PMV BLD AUTO: 9.9 FL (ref 6–12)
POTASSIUM BLD-SCNC: 3.6 MMOL/L (ref 3.5–4.7)
PROT SERPL-MCNC: 7.8 G/DL (ref 6.3–8)
PROT UR QL STRIP: ABNORMAL
RBC # BLD AUTO: 5.1 10*6/MM3 (ref 4.14–5.8)
SODIUM BLD-SCNC: 138 MMOL/L (ref 134–145)
SP GR UR STRIP: >=1.03 (ref 1–1.03)
UROBILINOGEN UR QL STRIP: ABNORMAL
WBC NRBC COR # BLD: 4.81 10*3/MM3 (ref 3.4–10.8)

## 2020-04-20 PROCEDURE — 96416 CHEMO PROLONG INFUSE W/PUMP: CPT

## 2020-04-20 PROCEDURE — 96417 CHEMO IV INFUS EACH ADDL SEQ: CPT

## 2020-04-20 PROCEDURE — 25010000002 BEVACIZUMAB-AWWB 100 MG/4ML SOLUTION 4 ML VIAL: Performed by: INTERNAL MEDICINE

## 2020-04-20 PROCEDURE — 80053 COMPREHEN METABOLIC PANEL: CPT

## 2020-04-20 PROCEDURE — 25010000002 FLUOROURACIL PER 500 MG: Performed by: INTERNAL MEDICINE

## 2020-04-20 PROCEDURE — 96375 TX/PRO/DX INJ NEW DRUG ADDON: CPT

## 2020-04-20 PROCEDURE — 82378 CARCINOEMBRYONIC ANTIGEN: CPT | Performed by: INTERNAL MEDICINE

## 2020-04-20 PROCEDURE — 96368 THER/DIAG CONCURRENT INF: CPT

## 2020-04-20 PROCEDURE — 96411 CHEMO IV PUSH ADDL DRUG: CPT

## 2020-04-20 PROCEDURE — 81003 URINALYSIS AUTO W/O SCOPE: CPT

## 2020-04-20 PROCEDURE — 25010000002 DEXAMETHASONE SODIUM PHOSPHATE 100 MG/10ML SOLUTION: Performed by: INTERNAL MEDICINE

## 2020-04-20 PROCEDURE — 96413 CHEMO IV INFUSION 1 HR: CPT

## 2020-04-20 PROCEDURE — 25010000002 BEVACIZUMAB-AWWB 400 MG/16ML SOLUTION 16 ML VIAL: Performed by: INTERNAL MEDICINE

## 2020-04-20 PROCEDURE — 25010000002 OXALIPLATIN PER 0.5 MG: Performed by: INTERNAL MEDICINE

## 2020-04-20 PROCEDURE — 25010000002 LEUCOVORIN CALCIUM PER 50 MG: Performed by: INTERNAL MEDICINE

## 2020-04-20 PROCEDURE — 25010000002 PALONOSETRON PER 25 MCG: Performed by: INTERNAL MEDICINE

## 2020-04-20 PROCEDURE — 96415 CHEMO IV INFUSION ADDL HR: CPT

## 2020-04-20 PROCEDURE — 99214 OFFICE O/P EST MOD 30 MIN: CPT | Performed by: INTERNAL MEDICINE

## 2020-04-20 PROCEDURE — 85025 COMPLETE CBC W/AUTO DIFF WBC: CPT

## 2020-04-20 RX ORDER — DEXTROSE MONOHYDRATE 50 MG/ML
250 INJECTION, SOLUTION INTRAVENOUS ONCE
Status: CANCELLED | OUTPATIENT
Start: 2020-05-18

## 2020-04-20 RX ORDER — DEXTROSE MONOHYDRATE 50 MG/ML
250 INJECTION, SOLUTION INTRAVENOUS ONCE
Status: CANCELLED | OUTPATIENT
Start: 2020-05-04

## 2020-04-20 RX ORDER — SODIUM CHLORIDE 9 MG/ML
250 INJECTION, SOLUTION INTRAVENOUS ONCE
Status: COMPLETED | OUTPATIENT
Start: 2020-04-20 | End: 2020-04-20

## 2020-04-20 RX ORDER — SODIUM CHLORIDE 9 MG/ML
250 INJECTION, SOLUTION INTRAVENOUS ONCE
Status: CANCELLED | OUTPATIENT
Start: 2020-05-04

## 2020-04-20 RX ORDER — DEXTROSE MONOHYDRATE 50 MG/ML
250 INJECTION, SOLUTION INTRAVENOUS ONCE
Status: COMPLETED | OUTPATIENT
Start: 2020-04-20 | End: 2020-04-20

## 2020-04-20 RX ORDER — SODIUM CHLORIDE 9 MG/ML
250 INJECTION, SOLUTION INTRAVENOUS ONCE
Status: CANCELLED | OUTPATIENT
Start: 2020-05-18

## 2020-04-20 RX ORDER — FLUOROURACIL 50 MG/ML
400 INJECTION, SOLUTION INTRAVENOUS ONCE
Status: CANCELLED | OUTPATIENT
Start: 2020-05-18

## 2020-04-20 RX ORDER — FAMOTIDINE 10 MG/ML
20 INJECTION, SOLUTION INTRAVENOUS AS NEEDED
Status: CANCELLED | OUTPATIENT
Start: 2020-05-04

## 2020-04-20 RX ORDER — FLUOROURACIL 50 MG/ML
400 INJECTION, SOLUTION INTRAVENOUS ONCE
Status: COMPLETED | OUTPATIENT
Start: 2020-04-20 | End: 2020-04-20

## 2020-04-20 RX ORDER — PALONOSETRON 0.05 MG/ML
0.25 INJECTION, SOLUTION INTRAVENOUS ONCE
Status: COMPLETED | OUTPATIENT
Start: 2020-04-20 | End: 2020-04-20

## 2020-04-20 RX ORDER — DIPHENHYDRAMINE HYDROCHLORIDE 50 MG/ML
50 INJECTION INTRAMUSCULAR; INTRAVENOUS AS NEEDED
Status: CANCELLED | OUTPATIENT
Start: 2020-05-04

## 2020-04-20 RX ORDER — FAMOTIDINE 10 MG/ML
20 INJECTION, SOLUTION INTRAVENOUS AS NEEDED
Status: CANCELLED | OUTPATIENT
Start: 2020-05-18

## 2020-04-20 RX ORDER — FLUOROURACIL 50 MG/ML
400 INJECTION, SOLUTION INTRAVENOUS ONCE
Status: CANCELLED | OUTPATIENT
Start: 2020-05-04

## 2020-04-20 RX ORDER — PALONOSETRON 0.05 MG/ML
0.25 INJECTION, SOLUTION INTRAVENOUS ONCE
Status: CANCELLED | OUTPATIENT
Start: 2020-05-18

## 2020-04-20 RX ORDER — PALONOSETRON 0.05 MG/ML
0.25 INJECTION, SOLUTION INTRAVENOUS ONCE
Status: CANCELLED | OUTPATIENT
Start: 2020-05-04

## 2020-04-20 RX ORDER — DIPHENHYDRAMINE HYDROCHLORIDE 50 MG/ML
50 INJECTION INTRAMUSCULAR; INTRAVENOUS AS NEEDED
Status: CANCELLED | OUTPATIENT
Start: 2020-05-18

## 2020-04-20 RX ADMIN — BEVACIZUMAB-AWWB 500 MG: 400 INJECTION, SOLUTION INTRAVENOUS at 11:20

## 2020-04-20 RX ADMIN — SODIUM CHLORIDE 250 ML: 9 INJECTION, SOLUTION INTRAVENOUS at 10:24

## 2020-04-20 RX ADMIN — LEUCOVORIN CALCIUM 900 MG: 350 INJECTION, POWDER, LYOPHILIZED, FOR SOLUTION INTRAMUSCULAR; INTRAVENOUS at 11:58

## 2020-04-20 RX ADMIN — PALONOSETRON HYDROCHLORIDE 0.25 MG: 0.25 INJECTION INTRAVENOUS at 10:47

## 2020-04-20 RX ADMIN — DEXTROSE MONOHYDRATE 250 ML: 50 INJECTION, SOLUTION INTRAVENOUS at 11:56

## 2020-04-20 RX ADMIN — DEXAMETHASONE SODIUM PHOSPHATE 12 MG: 10 INJECTION, SOLUTION INTRAMUSCULAR; INTRAVENOUS at 10:49

## 2020-04-20 RX ADMIN — OXALIPLATIN 190 MG: 5 INJECTION, SOLUTION, CONCENTRATE INTRAVENOUS at 11:58

## 2020-04-20 RX ADMIN — FLUOROURACIL 5400 MG: 50 INJECTION, SOLUTION INTRAVENOUS at 14:08

## 2020-04-20 RX ADMIN — FLUOROURACIL 900 MG: 50 INJECTION, SOLUTION INTRAVENOUS at 14:07

## 2020-04-20 NOTE — PROGRESS NOTES
[facility] GROUP OUTPATIENT FOLLOW UP CLINIC VISIT    REASON FOR FOLLOW-UP:    1.  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  Palliative therapy with FOLFIRI initiated on 11/4/2019.  3. Evidence of disease progression following 4 cycles of FOLFIRI.  4.  FOLFOX+bevacizumab initiated 1/6/2020    HISTORY OF PRESENT ILLNESS:  Silver Sloan is a 49 y.o. male who returns today for follow up of the above issue, anticipating cycle 8 of FOLFOX+frank.    Treatment was delayed last week due to neutropenia.  No fevers or chills.    He recently had some acute abdominal pain.  This resolved after a few hours.  He is now taking his stool softeners more frequently with more frequent bowel movements and has not had recurrence of the pain.  He remains fatigued.  No skin changes.  No mucositis.  No bleeding.    ONCOLOGIC HISTORY:  He lives in [facility].  He presented to [facility] for further care.  CT imaging indicated liver lesions.  He had a CT-guided liver biopsy performed on 10/14/2019 with pathology consistent with adenocarcinoma metastatic from colon.  He had a PET scan performed on 10/18/2019 which revealed a 2.7 cm mass in the sigmoid colon thought to be the primary malignancy.  The liver is enlarged measuring 22 cm.  There is a 14.3 cm mass in the right lobe of the liver with an SUV of 6.52 and an 11 cm mass in the left lobe of the liver with an SUV of 6.38.  There is a 1.5 cm lesion in the inferior right lobe of the liver with an SUV of 4.39.  There was no other evidence for metastatic disease.  There is no lymphadenopathy in the neck chest abdomen pelvis or retroperitoneum.  Multinodular goiter with thyromegaly was visualized.  No bowel obstruction was visualized.  An umbilical hernia and a left inguinal hernia were visualized.  There is some degenerative disc disease in the lumbar spine.     He saw an oncologist in [place].  He has family that  lives here in [place] and he believes that he wants to be treated here in [place].  A Mediport is scheduled in [place] on Friday.     His right upper quadrant abdominal pain is adequately controlled with oxycodone 5 mg tablets 1-2 every 6 hours as needed.  He does continue to have some sharp right lower chest pain with deep breaths.  He has nausea that is well controlled with Zofran and Phenergan.  He is having bowel movements with a bowel regimen.  He has lost about 25 pounds over the past month or so.    He was initially seen in consultation on 10/22/2019.  He wanted to see a surgical oncologist and was referred to Dr. Painting who agreed with initiating therapy with chemotherapy first with surgical resection possible at a later date.  He was referred for colonoscopy and this was done at the [facility].    He initiated palliative FOLFIRI on 11/4/2019.    Cycle #2 initiated on 11/18/2019.  Mild reaction.  We continue 50 mg of Benadryl with each cycle.    CT imaging on 12/23/2019 with progression.      Neutropenic on 12/30/2019 requiring a delay in therapy.      CEA 4399 on 10/22/2019 --> 6735 on 1/6/2020    FOLFOX+frank initiated 1/6/2020    Scans after 5 cycles of therapy on 3/9/2020 show a good response to therapy with a decrease in the liver metastases.    Cycle #6 initiated 3/16/2020.    ALLERGIES:  No Known Allergies    MEDICATIONS:  The medication list has been reviewed with the patient by the medical assistant, and the list has been updated in the electronic medical record, which I reviewed.  Medication dosages and frequencies were confirmed to be accurate.    I have reviewed the patient's medical history in detail and updated the computerized patient record.    Review of Systems   Constitutional: Positive for fatigue. Negative for appetite change, chills and fever.   HENT:   Negative for trouble swallowing.    Respiratory: Negative for cough and shortness of breath.   "  Cardiovascular: Negative for chest pain and leg swelling.   Gastrointestinal: Positive for abdominal pain (Resolved). Negative for abdominal distention, blood in stool, constipation, diarrhea and nausea.   Musculoskeletal: Negative for arthralgias and myalgias.   Skin: Negative for rash.   Neurological: Negative for dizziness and extremity weakness.   Hematological: Does not bruise/bleed easily.   Psychiatric/Behavioral: Positive for sleep disturbance.   Review of systems 4/13/2020 unchanged from previous office visit except as updated.       Vitals:    04/20/20 0936   BP: 131/89   Pulse: 110   Resp: 16   Temp: 98.6 °F (37 °C)   TempSrc: Oral   SpO2: 97%   Weight: 105 kg (230 lb 6.4 oz)   Height: 183 cm (72.05\")   PainSc: 0-No pain  Comment: colon cancer       PHYSICAL EXAMINATION:  GENERAL:  Well-developed well-nourished male; awake, alert and oriented, in no acute distress.    DIAGNOSTIC DATA:  Results for orders placed or performed in visit on 04/20/20   CBC Auto Differential   Result Value Ref Range    WBC 4.81 3.40 - 10.80 10*3/mm3    RBC 5.10 4.14 - 5.80 10*6/mm3    Hemoglobin 14.7 13.0 - 17.7 g/dL    Hematocrit 45.8 37.5 - 51.0 %    MCV 89.8 79.0 - 97.0 fL    MCH 28.8 26.6 - 33.0 pg    MCHC 32.1 31.5 - 35.7 g/dL    RDW 18.3 (H) 12.3 - 15.4 %    RDW-SD 60.0 (H) 37.0 - 54.0 fl    MPV 9.9 6.0 - 12.0 fL    Platelets 200 140 - 450 10*3/mm3    Neutrophil % 31.3 (L) 42.7 - 76.0 %    Lymphocyte % 40.5 19.6 - 45.3 %    Monocyte % 21.0 (H) 5.0 - 12.0 %    Eosinophil % 4.8 0.3 - 6.2 %    Basophil % 1.2 0.0 - 1.5 %    Immature Grans % 1.2 (H) 0.0 - 0.5 %    Neutrophils, Absolute 1.50 (L) 1.70 - 7.00 10*3/mm3    Lymphocytes, Absolute 1.95 0.70 - 3.10 10*3/mm3    Monocytes, Absolute 1.01 (H) 0.10 - 0.90 10*3/mm3    Eosinophils, Absolute 0.23 0.00 - 0.40 10*3/mm3    Basophils, Absolute 0.06 0.00 - 0.20 10*3/mm3    Immature Grans, Absolute 0.06 (H) 0.00 - 0.05 10*3/mm3    nRBC 0.0 0.0 - 0.2 /100 WBC       IMAGING: Scans " after 5 cycles of therapy on 3/9/2020 show a good response to therapy with a decrease in the liver metastases.      ASSESSMENT:  This is a 49 y.o. male with:  1.  Metastatic colon cancer: Extensive metastatic disease in the liver at diagnosis.  No other metastatic disease.  There is a 2.7 cm hypermetabolic lesion in the sigmoid colon on his PET imaging which is likely the primary tumor.  He did have a colonoscopy at the [facility] recently and we are working to get these results.  Mismatch repair intact.  K-lynnette mutation positive so he is not a candidate for Erbitux/vectibix.  BRAF mutation negative.  NRAS mutation negative.  HER-2 and MET negative by FISH.  TRK negative.  He was referred to Dr. Painting to discuss surgical options.  We have elected to proceed with chemotherapy followed by repeat imaging and then he will be reassessed to see if he is a surgical candidate or if there are any other local therapy options with respect to the liver.  We initially elected for palliative therapy with FOLFIRI.      Genetic testing with two VUS (variants of uncertain significance), one in MSH3 and one in NBN.     CT with progression after four cycle of FOLFIRI.  CEA also increased significantly.    Neutropenic on 12/30/2019.    Cycle 1 FOLFOX + frank on 1/6/2020.      Scans after 5 cycles of therapy on 3/9/2020 show a good response to therapy with a decrease in the liver metastases.    Neutropenia developed prior to cycle #8.  Treatment was held for 1 week.  Blood counts have recovered.  We will add G- mcg for 3 days following each treatment at this point.  He will administer this at home.  We have arranged this for him.    2.  Cancer related pain: He was having worsening pain on oxycodone and therefore this was switched to immediate release morphine.His pain has been reasonably well controlled and he is only required a few doses of the MSIR.    Patient did have an episode of acute abdominal pain 2 weekends  ago that resolved after a few hours.  Unclear etiology.  He is now using his stool softeners more frequently and has not had a recurrence of the pain.    3.  Chemotherapy-induced neutropenia.  Blood counts have recovered.  Add G- mcg daily for 3 days following chemotherapy.  He will give these at home after some teaching.    4.  Tachycardia: Heart rate is regular today.    5.  Depression and insomnia: We have increased Zoloft to 100 mg.  He is also utilizing temazepam nightly.    6.  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  He is having no issues with neuropathy without cold exposure.  We will continue to monitor.    7.  1+ urine protein.  Blood pressure within normal limits.  Continue to monitor while on Avastin.    8.  Thrombocytopenia secondary to chemotherapy.  Platelet count has normalized this week.    PLAN:  1. Proceed with cycle #8 of therapy  2. G- mcg x 3 days following each treatment  3. FOLFOX plus bevacizumab in 2, 4, and 6 weeks  4. CT imaging in 5 weeks and I will see him back in 6 weeks to review  5. Depending on the next scan results and his tolerance to chemotherapy we will need to discuss the possibility of some local therapy with Dr. Painting.    Marty Georges MD

## 2020-04-20 NOTE — TELEPHONE ENCOUNTER
Wendi with [facility] wanting to schedule delivery of Zarxio syringe for this patient. Would like callback in regards to this at [phone]

## 2020-04-20 NOTE — TELEPHONE ENCOUNTER
HUB message from [facility] forwarded to me-they were calling to schedule the delivery of Zarxio. I returned the call and spoke with Rosalva. She states pt has scheduled this to be delivered to the office on Thursday, 4/23/2020. Pt is scheduled in the office on Wednesday for Unhook and teaching. This will be delivered to our office tomorrow, 4/21/2020

## 2020-04-22 ENCOUNTER — DOCUMENTATION (OUTPATIENT)
Dept: PHARMACY | Facility: HOSPITAL | Age: 50
End: 2020-04-22

## 2020-04-22 ENCOUNTER — INFUSION (OUTPATIENT)
Dept: ONCOLOGY | Facility: HOSPITAL | Age: 50
End: 2020-04-22

## 2020-04-22 DIAGNOSIS — Z45.2 ENCOUNTER FOR FITTING AND ADJUSTMENT OF VASCULAR CATHETER: ICD-10-CM

## 2020-04-22 DIAGNOSIS — C18.7 PRIMARY MALIGNANT NEOPLASM OF SIGMOID COLON (HCC): Primary | ICD-10-CM

## 2020-04-22 PROCEDURE — G0463 HOSPITAL OUTPT CLINIC VISIT: HCPCS

## 2020-04-22 PROCEDURE — 25010000003 HEPARIN LOCK FLUCH PER 10 UNITS: Performed by: INTERNAL MEDICINE

## 2020-04-22 RX ORDER — SODIUM CHLORIDE 0.9 % (FLUSH) 0.9 %
10 SYRINGE (ML) INJECTION AS NEEDED
Status: CANCELLED | OUTPATIENT
Start: 2020-04-22

## 2020-04-22 RX ORDER — HEPARIN SODIUM (PORCINE) LOCK FLUSH IV SOLN 100 UNIT/ML 100 UNIT/ML
500 SOLUTION INTRAVENOUS AS NEEDED
Status: CANCELLED | OUTPATIENT
Start: 2020-04-22

## 2020-04-22 RX ORDER — SODIUM CHLORIDE 0.9 % (FLUSH) 0.9 %
10 SYRINGE (ML) INJECTION AS NEEDED
Status: DISCONTINUED | OUTPATIENT
Start: 2020-04-22 | End: 2020-04-22 | Stop reason: HOSPADM

## 2020-04-22 RX ORDER — HEPARIN SODIUM (PORCINE) LOCK FLUSH IV SOLN 100 UNIT/ML 100 UNIT/ML
500 SOLUTION INTRAVENOUS AS NEEDED
Status: DISCONTINUED | OUTPATIENT
Start: 2020-04-22 | End: 2020-04-22 | Stop reason: HOSPADM

## 2020-04-22 RX ADMIN — Medication 500 UNITS: at 12:52

## 2020-04-22 RX ADMIN — SODIUM CHLORIDE, PRESERVATIVE FREE 10 ML: 5 INJECTION INTRAVENOUS at 12:53

## 2020-04-22 NOTE — PROGRESS NOTES
Patient given Zarxio injections to take home.  Patient to take one for the next 3 days with this cycle and the next cycle.  Instructed patient on self injection, injection site selection.  Patient v/u.  Instructed patient to call with any questions or concerns.

## 2020-04-22 NOTE — PROGRESS NOTES
Gave pt samples of Zarxio while pt was in shot appointment with Erlinda. She was explaining how to give at home. Verified .

## 2020-04-27 ENCOUNTER — APPOINTMENT (OUTPATIENT)
Dept: ONCOLOGY | Facility: HOSPITAL | Age: 50
End: 2020-04-27

## 2020-05-04 ENCOUNTER — INFUSION (OUTPATIENT)
Dept: ONCOLOGY | Facility: HOSPITAL | Age: 50
End: 2020-05-04

## 2020-05-04 VITALS
BODY MASS INDEX: 31.69 KG/M2 | WEIGHT: 234 LBS | TEMPERATURE: 98.5 F | DIASTOLIC BLOOD PRESSURE: 85 MMHG | HEART RATE: 106 BPM | SYSTOLIC BLOOD PRESSURE: 116 MMHG | OXYGEN SATURATION: 97 %

## 2020-05-04 DIAGNOSIS — C18.7 PRIMARY MALIGNANT NEOPLASM OF SIGMOID COLON (HCC): Primary | ICD-10-CM

## 2020-05-04 LAB
ALBUMIN SERPL-MCNC: 4.2 G/DL (ref 3.5–5.2)
ALBUMIN/GLOB SERPL: 1.1 G/DL (ref 1.1–2.4)
ALP SERPL-CCNC: 97 U/L (ref 38–116)
ALT SERPL W P-5'-P-CCNC: 19 U/L (ref 0–41)
ANION GAP SERPL CALCULATED.3IONS-SCNC: 15.8 MMOL/L (ref 5–15)
AST SERPL-CCNC: 25 U/L (ref 0–40)
BASOPHILS # BLD AUTO: 0.04 10*3/MM3 (ref 0–0.2)
BASOPHILS NFR BLD AUTO: 0.8 % (ref 0–1.5)
BILIRUB SERPL-MCNC: 0.8 MG/DL (ref 0.2–1.2)
BILIRUB UR QL STRIP: ABNORMAL
BUN BLD-MCNC: 11 MG/DL (ref 6–20)
BUN/CREAT SERPL: 12.4 (ref 7.3–30)
CALCIUM SPEC-SCNC: 9.7 MG/DL (ref 8.5–10.2)
CHLORIDE SERPL-SCNC: 102 MMOL/L (ref 98–107)
CLARITY UR: CLEAR
CO2 SERPL-SCNC: 21.2 MMOL/L (ref 22–29)
COLOR UR: YELLOW
CREAT BLD-MCNC: 0.89 MG/DL (ref 0.7–1.3)
DEPRECATED RDW RBC AUTO: 56.5 FL (ref 37–54)
EOSINOPHIL # BLD AUTO: 0.41 10*3/MM3 (ref 0–0.4)
EOSINOPHIL NFR BLD AUTO: 8.1 % (ref 0.3–6.2)
ERYTHROCYTE [DISTWIDTH] IN BLOOD BY AUTOMATED COUNT: 18 % (ref 12.3–15.4)
GFR SERPL CREATININE-BSD FRML MDRD: 110 ML/MIN/1.73
GFR SERPL CREATININE-BSD FRML MDRD: 91 ML/MIN/1.73
GLOBULIN UR ELPH-MCNC: 3.8 GM/DL (ref 1.8–3.5)
GLUCOSE BLD-MCNC: 99 MG/DL (ref 74–124)
GLUCOSE UR STRIP-MCNC: NEGATIVE MG/DL
HCT VFR BLD AUTO: 44 % (ref 37.5–51)
HGB BLD-MCNC: 14.5 G/DL (ref 13–17.7)
HGB UR QL STRIP.AUTO: NEGATIVE
IMM GRANULOCYTES # BLD AUTO: 0.02 10*3/MM3 (ref 0–0.05)
IMM GRANULOCYTES NFR BLD AUTO: 0.4 % (ref 0–0.5)
KETONES UR QL STRIP: NEGATIVE
LEUKOCYTE ESTERASE UR QL STRIP.AUTO: NEGATIVE
LYMPHOCYTES # BLD AUTO: 2.19 10*3/MM3 (ref 0.7–3.1)
LYMPHOCYTES NFR BLD AUTO: 43.3 % (ref 19.6–45.3)
MCH RBC QN AUTO: 29.1 PG (ref 26.6–33)
MCHC RBC AUTO-ENTMCNC: 33 G/DL (ref 31.5–35.7)
MCV RBC AUTO: 88.4 FL (ref 79–97)
MONOCYTES # BLD AUTO: 0.74 10*3/MM3 (ref 0.1–0.9)
MONOCYTES NFR BLD AUTO: 14.6 % (ref 5–12)
NEUTROPHILS # BLD AUTO: 1.66 10*3/MM3 (ref 1.7–7)
NEUTROPHILS NFR BLD AUTO: 32.8 % (ref 42.7–76)
NITRITE UR QL STRIP: NEGATIVE
NRBC BLD AUTO-RTO: 0 /100 WBC (ref 0–0.2)
PH UR STRIP.AUTO: 5.5 [PH] (ref 4.5–8)
PLATELET # BLD AUTO: 170 10*3/MM3 (ref 140–450)
PMV BLD AUTO: 10.2 FL (ref 6–12)
POTASSIUM BLD-SCNC: 3.7 MMOL/L (ref 3.5–4.7)
PROT SERPL-MCNC: 8 G/DL (ref 6.3–8)
PROT UR QL STRIP: ABNORMAL
RBC # BLD AUTO: 4.98 10*6/MM3 (ref 4.14–5.8)
SODIUM BLD-SCNC: 139 MMOL/L (ref 134–145)
SP GR UR STRIP: >=1.03 (ref 1–1.03)
UROBILINOGEN UR QL STRIP: ABNORMAL
WBC NRBC COR # BLD: 5.06 10*3/MM3 (ref 3.4–10.8)

## 2020-05-04 PROCEDURE — 96416 CHEMO PROLONG INFUSE W/PUMP: CPT

## 2020-05-04 PROCEDURE — 25010000002 BEVACIZUMAB-AWWB 100 MG/4ML SOLUTION 4 ML VIAL: Performed by: INTERNAL MEDICINE

## 2020-05-04 PROCEDURE — 25010000002 LEUCOVORIN CALCIUM PER 50 MG: Performed by: INTERNAL MEDICINE

## 2020-05-04 PROCEDURE — 25010000002 DEXAMETHASONE SODIUM PHOSPHATE 100 MG/10ML SOLUTION: Performed by: INTERNAL MEDICINE

## 2020-05-04 PROCEDURE — 96411 CHEMO IV PUSH ADDL DRUG: CPT

## 2020-05-04 PROCEDURE — 96368 THER/DIAG CONCURRENT INF: CPT

## 2020-05-04 PROCEDURE — 96415 CHEMO IV INFUSION ADDL HR: CPT

## 2020-05-04 PROCEDURE — 96413 CHEMO IV INFUSION 1 HR: CPT

## 2020-05-04 PROCEDURE — 80053 COMPREHEN METABOLIC PANEL: CPT

## 2020-05-04 PROCEDURE — 96375 TX/PRO/DX INJ NEW DRUG ADDON: CPT

## 2020-05-04 PROCEDURE — 36415 COLL VENOUS BLD VENIPUNCTURE: CPT

## 2020-05-04 PROCEDURE — 85025 COMPLETE CBC W/AUTO DIFF WBC: CPT

## 2020-05-04 PROCEDURE — 25010000002 FLUOROURACIL PER 500 MG: Performed by: INTERNAL MEDICINE

## 2020-05-04 PROCEDURE — 81003 URINALYSIS AUTO W/O SCOPE: CPT

## 2020-05-04 PROCEDURE — 25010000002 PALONOSETRON PER 25 MCG: Performed by: INTERNAL MEDICINE

## 2020-05-04 PROCEDURE — 25010000002 OXALIPLATIN PER 0.5 MG: Performed by: INTERNAL MEDICINE

## 2020-05-04 PROCEDURE — 96417 CHEMO IV INFUS EACH ADDL SEQ: CPT

## 2020-05-04 PROCEDURE — 25010000002 BEVACIZUMAB-AWWB 400 MG/16ML SOLUTION 16 ML VIAL: Performed by: INTERNAL MEDICINE

## 2020-05-04 RX ORDER — PALONOSETRON 0.05 MG/ML
0.25 INJECTION, SOLUTION INTRAVENOUS ONCE
Status: COMPLETED | OUTPATIENT
Start: 2020-05-04 | End: 2020-05-04

## 2020-05-04 RX ORDER — SODIUM CHLORIDE 9 MG/ML
250 INJECTION, SOLUTION INTRAVENOUS ONCE
Status: COMPLETED | OUTPATIENT
Start: 2020-05-04 | End: 2020-05-04

## 2020-05-04 RX ORDER — DEXTROSE MONOHYDRATE 50 MG/ML
250 INJECTION, SOLUTION INTRAVENOUS ONCE
Status: COMPLETED | OUTPATIENT
Start: 2020-05-04 | End: 2020-05-04

## 2020-05-04 RX ORDER — FLUOROURACIL 50 MG/ML
400 INJECTION, SOLUTION INTRAVENOUS ONCE
Status: COMPLETED | OUTPATIENT
Start: 2020-05-04 | End: 2020-05-04

## 2020-05-04 RX ADMIN — SODIUM CHLORIDE 250 ML: 9 INJECTION, SOLUTION INTRAVENOUS at 09:54

## 2020-05-04 RX ADMIN — FLUOROURACIL 900 MG: 50 INJECTION, SOLUTION INTRAVENOUS at 13:04

## 2020-05-04 RX ADMIN — DEXTROSE MONOHYDRATE 250 ML: 50 INJECTION, SOLUTION INTRAVENOUS at 11:05

## 2020-05-04 RX ADMIN — FLUOROURACIL 5400 MG: 50 INJECTION, SOLUTION INTRAVENOUS at 13:04

## 2020-05-04 RX ADMIN — DEXAMETHASONE SODIUM PHOSPHATE 12 MG: 10 INJECTION, SOLUTION INTRAMUSCULAR; INTRAVENOUS at 09:54

## 2020-05-04 RX ADMIN — LEUCOVORIN CALCIUM 900 MG: 350 INJECTION, POWDER, LYOPHILIZED, FOR SOLUTION INTRAMUSCULAR; INTRAVENOUS at 11:06

## 2020-05-04 RX ADMIN — OXALIPLATIN 190 MG: 5 INJECTION, SOLUTION, CONCENTRATE INTRAVENOUS at 11:08

## 2020-05-04 RX ADMIN — BEVACIZUMAB-AWWB 500 MG: 400 INJECTION, SOLUTION INTRAVENOUS at 10:28

## 2020-05-04 RX ADMIN — PALONSETRON HYDROCHLORIDE 0.25 MG: 0.25 INJECTION, SOLUTION INTRAVENOUS at 09:54

## 2020-05-06 ENCOUNTER — INFUSION (OUTPATIENT)
Dept: ONCOLOGY | Facility: HOSPITAL | Age: 50
End: 2020-05-06

## 2020-05-06 DIAGNOSIS — C18.7 PRIMARY MALIGNANT NEOPLASM OF SIGMOID COLON (HCC): Primary | ICD-10-CM

## 2020-05-06 DIAGNOSIS — Z45.2 ENCOUNTER FOR FITTING AND ADJUSTMENT OF VASCULAR CATHETER: ICD-10-CM

## 2020-05-06 PROCEDURE — 25010000003 HEPARIN LOCK FLUSH PER 10 UNITS: Performed by: INTERNAL MEDICINE

## 2020-05-06 RX ORDER — HEPARIN SODIUM (PORCINE) LOCK FLUSH IV SOLN 100 UNIT/ML 100 UNIT/ML
500 SOLUTION INTRAVENOUS AS NEEDED
Status: CANCELLED | OUTPATIENT
Start: 2020-05-06

## 2020-05-06 RX ORDER — SODIUM CHLORIDE 0.9 % (FLUSH) 0.9 %
10 SYRINGE (ML) INJECTION AS NEEDED
Status: CANCELLED | OUTPATIENT
Start: 2020-05-06

## 2020-05-06 RX ORDER — HEPARIN SODIUM (PORCINE) LOCK FLUSH IV SOLN 100 UNIT/ML 100 UNIT/ML
500 SOLUTION INTRAVENOUS AS NEEDED
Status: DISCONTINUED | OUTPATIENT
Start: 2020-05-06 | End: 2020-05-06 | Stop reason: HOSPADM

## 2020-05-06 RX ORDER — SODIUM CHLORIDE 0.9 % (FLUSH) 0.9 %
10 SYRINGE (ML) INJECTION AS NEEDED
Status: DISCONTINUED | OUTPATIENT
Start: 2020-05-06 | End: 2020-05-06 | Stop reason: HOSPADM

## 2020-05-06 RX ADMIN — Medication 500 UNITS: at 11:29

## 2020-05-06 RX ADMIN — SODIUM CHLORIDE, PRESERVATIVE FREE 10 ML: 5 INJECTION INTRAVENOUS at 11:28

## 2020-05-18 ENCOUNTER — INFUSION (OUTPATIENT)
Dept: ONCOLOGY | Facility: HOSPITAL | Age: 50
End: 2020-05-18

## 2020-05-18 VITALS
SYSTOLIC BLOOD PRESSURE: 117 MMHG | TEMPERATURE: 98.8 F | OXYGEN SATURATION: 97 % | RESPIRATION RATE: 15 BRPM | HEART RATE: 103 BPM | BODY MASS INDEX: 31.56 KG/M2 | DIASTOLIC BLOOD PRESSURE: 79 MMHG | WEIGHT: 233 LBS

## 2020-05-18 DIAGNOSIS — C18.7 PRIMARY MALIGNANT NEOPLASM OF SIGMOID COLON (HCC): Primary | ICD-10-CM

## 2020-05-18 LAB
ALBUMIN SERPL-MCNC: 3.9 G/DL (ref 3.5–5.2)
ALBUMIN/GLOB SERPL: 1.1 G/DL (ref 1.1–2.4)
ALP SERPL-CCNC: 102 U/L (ref 38–116)
ALT SERPL W P-5'-P-CCNC: 21 U/L (ref 0–41)
ANION GAP SERPL CALCULATED.3IONS-SCNC: 11.4 MMOL/L (ref 5–15)
AST SERPL-CCNC: 26 U/L (ref 0–40)
BASOPHILS # BLD AUTO: 0.04 10*3/MM3 (ref 0–0.2)
BASOPHILS NFR BLD AUTO: 1 % (ref 0–1.5)
BILIRUB SERPL-MCNC: 0.5 MG/DL (ref 0.2–1.2)
BILIRUB UR QL STRIP: NEGATIVE
BUN BLD-MCNC: 11 MG/DL (ref 6–20)
BUN/CREAT SERPL: 12.6 (ref 7.3–30)
CALCIUM SPEC-SCNC: 9.1 MG/DL (ref 8.5–10.2)
CHLORIDE SERPL-SCNC: 103 MMOL/L (ref 98–107)
CLARITY UR: CLEAR
CO2 SERPL-SCNC: 23.6 MMOL/L (ref 22–29)
COLOR UR: YELLOW
CREAT BLD-MCNC: 0.87 MG/DL (ref 0.7–1.3)
DEPRECATED RDW RBC AUTO: 57.1 FL (ref 37–54)
EOSINOPHIL # BLD AUTO: 0.2 10*3/MM3 (ref 0–0.4)
EOSINOPHIL NFR BLD AUTO: 4.8 % (ref 0.3–6.2)
ERYTHROCYTE [DISTWIDTH] IN BLOOD BY AUTOMATED COUNT: 18 % (ref 12.3–15.4)
GFR SERPL CREATININE-BSD FRML MDRD: 113 ML/MIN/1.73
GFR SERPL CREATININE-BSD FRML MDRD: 93 ML/MIN/1.73
GLOBULIN UR ELPH-MCNC: 3.4 GM/DL (ref 1.8–3.5)
GLUCOSE BLD-MCNC: 167 MG/DL (ref 74–124)
GLUCOSE UR STRIP-MCNC: NEGATIVE MG/DL
HCT VFR BLD AUTO: 41.8 % (ref 37.5–51)
HGB BLD-MCNC: 13.5 G/DL (ref 13–17.7)
HGB UR QL STRIP.AUTO: NEGATIVE
IMM GRANULOCYTES # BLD AUTO: 0.05 10*3/MM3 (ref 0–0.05)
IMM GRANULOCYTES NFR BLD AUTO: 1.2 % (ref 0–0.5)
KETONES UR QL STRIP: NEGATIVE
LEUKOCYTE ESTERASE UR QL STRIP.AUTO: NEGATIVE
LYMPHOCYTES # BLD AUTO: 1.62 10*3/MM3 (ref 0.7–3.1)
LYMPHOCYTES NFR BLD AUTO: 38.8 % (ref 19.6–45.3)
MCH RBC QN AUTO: 29 PG (ref 26.6–33)
MCHC RBC AUTO-ENTMCNC: 32.3 G/DL (ref 31.5–35.7)
MCV RBC AUTO: 89.9 FL (ref 79–97)
MONOCYTES # BLD AUTO: 0.62 10*3/MM3 (ref 0.1–0.9)
MONOCYTES NFR BLD AUTO: 14.8 % (ref 5–12)
NEUTROPHILS # BLD AUTO: 1.65 10*3/MM3 (ref 1.7–7)
NEUTROPHILS NFR BLD AUTO: 39.4 % (ref 42.7–76)
NITRITE UR QL STRIP: NEGATIVE
NRBC BLD AUTO-RTO: 0 /100 WBC (ref 0–0.2)
PH UR STRIP.AUTO: 5.5 [PH] (ref 4.5–8)
PLATELET # BLD AUTO: 111 10*3/MM3 (ref 140–450)
PMV BLD AUTO: 9 FL (ref 6–12)
POTASSIUM BLD-SCNC: 3.7 MMOL/L (ref 3.5–4.7)
PROT SERPL-MCNC: 7.3 G/DL (ref 6.3–8)
PROT UR QL STRIP: ABNORMAL
RBC # BLD AUTO: 4.65 10*6/MM3 (ref 4.14–5.8)
SODIUM BLD-SCNC: 138 MMOL/L (ref 134–145)
SP GR UR STRIP: >=1.03 (ref 1–1.03)
UROBILINOGEN UR QL STRIP: ABNORMAL
WBC NRBC COR # BLD: 4.18 10*3/MM3 (ref 3.4–10.8)

## 2020-05-18 PROCEDURE — 96368 THER/DIAG CONCURRENT INF: CPT

## 2020-05-18 PROCEDURE — 25010000002 DEXAMETHASONE SODIUM PHOSPHATE 100 MG/10ML SOLUTION: Performed by: INTERNAL MEDICINE

## 2020-05-18 PROCEDURE — 25010000002 BEVACIZUMAB-AWWB 400 MG/16ML SOLUTION 16 ML VIAL: Performed by: INTERNAL MEDICINE

## 2020-05-18 PROCEDURE — 80053 COMPREHEN METABOLIC PANEL: CPT

## 2020-05-18 PROCEDURE — 96417 CHEMO IV INFUS EACH ADDL SEQ: CPT

## 2020-05-18 PROCEDURE — 96416 CHEMO PROLONG INFUSE W/PUMP: CPT

## 2020-05-18 PROCEDURE — 96413 CHEMO IV INFUSION 1 HR: CPT

## 2020-05-18 PROCEDURE — 81003 URINALYSIS AUTO W/O SCOPE: CPT

## 2020-05-18 PROCEDURE — 25010000002 LEUCOVORIN CALCIUM PER 50 MG: Performed by: INTERNAL MEDICINE

## 2020-05-18 PROCEDURE — 96375 TX/PRO/DX INJ NEW DRUG ADDON: CPT

## 2020-05-18 PROCEDURE — 96415 CHEMO IV INFUSION ADDL HR: CPT

## 2020-05-18 PROCEDURE — 25010000002 BEVACIZUMAB-AWWB 100 MG/4ML SOLUTION 4 ML VIAL: Performed by: INTERNAL MEDICINE

## 2020-05-18 PROCEDURE — 96411 CHEMO IV PUSH ADDL DRUG: CPT

## 2020-05-18 PROCEDURE — 25010000002 FLUOROURACIL PER 500 MG: Performed by: INTERNAL MEDICINE

## 2020-05-18 PROCEDURE — 25010000002 OXALIPLATIN PER 0.5 MG: Performed by: INTERNAL MEDICINE

## 2020-05-18 PROCEDURE — 25010000002 PALONOSETRON PER 25 MCG: Performed by: INTERNAL MEDICINE

## 2020-05-18 PROCEDURE — 85025 COMPLETE CBC W/AUTO DIFF WBC: CPT

## 2020-05-18 RX ORDER — FLUOROURACIL 50 MG/ML
400 INJECTION, SOLUTION INTRAVENOUS ONCE
Status: COMPLETED | OUTPATIENT
Start: 2020-05-18 | End: 2020-05-18

## 2020-05-18 RX ORDER — SODIUM CHLORIDE 9 MG/ML
250 INJECTION, SOLUTION INTRAVENOUS ONCE
Status: COMPLETED | OUTPATIENT
Start: 2020-05-18 | End: 2020-05-18

## 2020-05-18 RX ORDER — DEXTROSE MONOHYDRATE 50 MG/ML
250 INJECTION, SOLUTION INTRAVENOUS ONCE
Status: COMPLETED | OUTPATIENT
Start: 2020-05-18 | End: 2020-05-18

## 2020-05-18 RX ORDER — PALONOSETRON 0.05 MG/ML
0.25 INJECTION, SOLUTION INTRAVENOUS ONCE
Status: COMPLETED | OUTPATIENT
Start: 2020-05-18 | End: 2020-05-18

## 2020-05-18 RX ADMIN — DEXTROSE MONOHYDRATE 250 ML: 50 INJECTION, SOLUTION INTRAVENOUS at 10:38

## 2020-05-18 RX ADMIN — FLUOROURACIL 900 MG: 50 INJECTION, SOLUTION INTRAVENOUS at 12:45

## 2020-05-18 RX ADMIN — LEUCOVORIN CALCIUM 900 MG: 350 INJECTION, POWDER, LYOPHILIZED, FOR SOLUTION INTRAMUSCULAR; INTRAVENOUS at 10:39

## 2020-05-18 RX ADMIN — FLUOROURACIL 5400 MG: 50 INJECTION, SOLUTION INTRAVENOUS at 12:45

## 2020-05-18 RX ADMIN — BEVACIZUMAB-AWWB 500 MG: 400 INJECTION, SOLUTION INTRAVENOUS at 10:00

## 2020-05-18 RX ADMIN — SODIUM CHLORIDE 250 ML: 900 INJECTION, SOLUTION INTRAVENOUS at 08:51

## 2020-05-18 RX ADMIN — DEXAMETHASONE SODIUM PHOSPHATE 12 MG: 10 INJECTION, SOLUTION INTRAMUSCULAR; INTRAVENOUS at 08:51

## 2020-05-18 RX ADMIN — OXALIPLATIN 190 MG: 5 INJECTION, SOLUTION, CONCENTRATE INTRAVENOUS at 10:40

## 2020-05-18 RX ADMIN — PALONOSETRON HYDROCHLORIDE 0.25 MG: 0.25 INJECTION, SOLUTION INTRAVENOUS at 08:51

## 2020-05-20 ENCOUNTER — INFUSION (OUTPATIENT)
Dept: ONCOLOGY | Facility: HOSPITAL | Age: 50
End: 2020-05-20

## 2020-05-20 DIAGNOSIS — C18.7 PRIMARY MALIGNANT NEOPLASM OF SIGMOID COLON (HCC): Primary | ICD-10-CM

## 2020-05-20 DIAGNOSIS — Z45.2 ENCOUNTER FOR FITTING AND ADJUSTMENT OF VASCULAR CATHETER: ICD-10-CM

## 2020-05-20 PROCEDURE — 25010000003 HEPARIN LOCK FLUSH PER 10 UNITS: Performed by: INTERNAL MEDICINE

## 2020-05-20 RX ORDER — HEPARIN SODIUM (PORCINE) LOCK FLUSH IV SOLN 100 UNIT/ML 100 UNIT/ML
500 SOLUTION INTRAVENOUS AS NEEDED
Status: DISCONTINUED | OUTPATIENT
Start: 2020-05-20 | End: 2020-05-20 | Stop reason: HOSPADM

## 2020-05-20 RX ORDER — SODIUM CHLORIDE 0.9 % (FLUSH) 0.9 %
10 SYRINGE (ML) INJECTION AS NEEDED
Status: DISCONTINUED | OUTPATIENT
Start: 2020-05-20 | End: 2020-05-20 | Stop reason: HOSPADM

## 2020-05-20 RX ORDER — SODIUM CHLORIDE 0.9 % (FLUSH) 0.9 %
10 SYRINGE (ML) INJECTION AS NEEDED
Status: CANCELLED | OUTPATIENT
Start: 2020-05-20

## 2020-05-20 RX ORDER — HEPARIN SODIUM (PORCINE) LOCK FLUSH IV SOLN 100 UNIT/ML 100 UNIT/ML
500 SOLUTION INTRAVENOUS AS NEEDED
Status: CANCELLED | OUTPATIENT
Start: 2020-05-20

## 2020-05-20 RX ADMIN — SODIUM CHLORIDE, PRESERVATIVE FREE 10 ML: 5 INJECTION INTRAVENOUS at 11:06

## 2020-05-20 RX ADMIN — Medication 500 UNITS: at 11:06

## 2020-05-28 ENCOUNTER — HOSPITAL ENCOUNTER (OUTPATIENT)
Dept: PET IMAGING | Facility: HOSPITAL | Age: 50
Discharge: HOME | End: 2020-05-28
Admitting: INTERNAL MEDICINE

## 2020-05-28 ENCOUNTER — INFUSION (OUTPATIENT)
Dept: ONCOLOGY | Facility: HOSPITAL | Age: 50
End: 2020-05-28

## 2020-05-28 DIAGNOSIS — C18.7 PRIMARY MALIGNANT NEOPLASM OF SIGMOID COLON (HCC): ICD-10-CM

## 2020-05-28 LAB — CREAT BLDA-MCNC: 0.3 MG/DL (ref 0.6–1.3)

## 2020-05-28 PROCEDURE — 0 DIATRIZOATE MEGLUMINE & SODIUM PER 1 ML: Performed by: INTERNAL MEDICINE

## 2020-05-28 PROCEDURE — 74177 CT ABD & PELVIS W/CONTRAST: CPT

## 2020-05-28 PROCEDURE — 71260 CT THORAX DX C+: CPT

## 2020-05-28 PROCEDURE — 82565 ASSAY OF CREATININE: CPT

## 2020-05-28 PROCEDURE — 25010000002 IOPAMIDOL 61 % SOLUTION: Performed by: INTERNAL MEDICINE

## 2020-05-28 RX ADMIN — IOPAMIDOL 85 ML: 612 INJECTION, SOLUTION INTRAVENOUS at 14:30

## 2020-05-28 RX ADMIN — DIATRIZOATE MEGLUMINE AND DIATRIZOATE SODIUM 30 ML: 660; 100 LIQUID ORAL; RECTAL at 14:30

## 2020-06-01 ENCOUNTER — INFUSION (OUTPATIENT)
Dept: ONCOLOGY | Facility: HOSPITAL | Age: 50
End: 2020-06-01

## 2020-06-01 ENCOUNTER — DOCUMENTATION (OUTPATIENT)
Dept: ONCOLOGY | Facility: CLINIC | Age: 50
End: 2020-06-01

## 2020-06-01 ENCOUNTER — OFFICE VISIT (OUTPATIENT)
Dept: ONCOLOGY | Facility: CLINIC | Age: 50
End: 2020-06-01

## 2020-06-01 ENCOUNTER — TELEPHONE (OUTPATIENT)
Dept: GENERAL RADIOLOGY | Facility: HOSPITAL | Age: 50
End: 2020-06-01

## 2020-06-01 VITALS
WEIGHT: 230.7 LBS | BODY MASS INDEX: 31.25 KG/M2 | HEIGHT: 72 IN | DIASTOLIC BLOOD PRESSURE: 92 MMHG | SYSTOLIC BLOOD PRESSURE: 134 MMHG | RESPIRATION RATE: 16 BRPM | TEMPERATURE: 97.1 F | OXYGEN SATURATION: 94 % | HEART RATE: 97 BPM

## 2020-06-01 VITALS — DIASTOLIC BLOOD PRESSURE: 88 MMHG | SYSTOLIC BLOOD PRESSURE: 122 MMHG | HEART RATE: 82 BPM

## 2020-06-01 DIAGNOSIS — D70.1 CHEMOTHERAPY INDUCED NEUTROPENIA (HCC): Primary | ICD-10-CM

## 2020-06-01 DIAGNOSIS — C18.7 PRIMARY MALIGNANT NEOPLASM OF SIGMOID COLON (HCC): ICD-10-CM

## 2020-06-01 DIAGNOSIS — F32.A DEPRESSION, UNSPECIFIED DEPRESSION TYPE: ICD-10-CM

## 2020-06-01 DIAGNOSIS — C18.7 PRIMARY MALIGNANT NEOPLASM OF SIGMOID COLON (HCC): Primary | ICD-10-CM

## 2020-06-01 DIAGNOSIS — T45.1X5A CHEMOTHERAPY INDUCED NEUTROPENIA (HCC): Primary | ICD-10-CM

## 2020-06-01 LAB
ALBUMIN SERPL-MCNC: 4.1 G/DL (ref 3.5–5.2)
ALBUMIN/GLOB SERPL: 1.1 G/DL (ref 1.1–2.4)
ALP SERPL-CCNC: 91 U/L (ref 38–116)
ALT SERPL W P-5'-P-CCNC: 19 U/L (ref 0–41)
ANION GAP SERPL CALCULATED.3IONS-SCNC: 12.1 MMOL/L (ref 5–15)
AST SERPL-CCNC: 25 U/L (ref 0–40)
BASOPHILS # BLD AUTO: 0.03 10*3/MM3 (ref 0–0.2)
BASOPHILS NFR BLD AUTO: 0.6 % (ref 0–1.5)
BILIRUB SERPL-MCNC: 0.6 MG/DL (ref 0.2–1.2)
BILIRUB UR QL STRIP: NEGATIVE
BUN BLD-MCNC: 9 MG/DL (ref 6–20)
BUN/CREAT SERPL: 9.3 (ref 7.3–30)
CALCIUM SPEC-SCNC: 9.4 MG/DL (ref 8.5–10.2)
CEA SERPL-MCNC: 1879 NG/ML
CHLORIDE SERPL-SCNC: 102 MMOL/L (ref 98–107)
CLARITY UR: CLEAR
CO2 SERPL-SCNC: 23.9 MMOL/L (ref 22–29)
COLOR UR: YELLOW
CREAT BLD-MCNC: 0.97 MG/DL (ref 0.7–1.3)
DEPRECATED RDW RBC AUTO: 58 FL (ref 37–54)
EOSINOPHIL # BLD AUTO: 0.26 10*3/MM3 (ref 0–0.4)
EOSINOPHIL NFR BLD AUTO: 5.4 % (ref 0.3–6.2)
ERYTHROCYTE [DISTWIDTH] IN BLOOD BY AUTOMATED COUNT: 17.6 % (ref 12.3–15.4)
GFR SERPL CREATININE-BSD FRML MDRD: 82 ML/MIN/1.73
GFR SERPL CREATININE-BSD FRML MDRD: 99 ML/MIN/1.73
GLOBULIN UR ELPH-MCNC: 3.8 GM/DL (ref 1.8–3.5)
GLUCOSE BLD-MCNC: 106 MG/DL (ref 74–124)
GLUCOSE UR STRIP-MCNC: NEGATIVE MG/DL
HCT VFR BLD AUTO: 44.8 % (ref 37.5–51)
HGB BLD-MCNC: 14.3 G/DL (ref 13–17.7)
HGB UR QL STRIP.AUTO: NEGATIVE
IMM GRANULOCYTES # BLD AUTO: 0.01 10*3/MM3 (ref 0–0.05)
IMM GRANULOCYTES NFR BLD AUTO: 0.2 % (ref 0–0.5)
KETONES UR QL STRIP: NEGATIVE
LEUKOCYTE ESTERASE UR QL STRIP.AUTO: NEGATIVE
LYMPHOCYTES # BLD AUTO: 1.72 10*3/MM3 (ref 0.7–3.1)
LYMPHOCYTES NFR BLD AUTO: 35.7 % (ref 19.6–45.3)
MCH RBC QN AUTO: 29.1 PG (ref 26.6–33)
MCHC RBC AUTO-ENTMCNC: 31.9 G/DL (ref 31.5–35.7)
MCV RBC AUTO: 91.2 FL (ref 79–97)
MONOCYTES # BLD AUTO: 0.8 10*3/MM3 (ref 0.1–0.9)
MONOCYTES NFR BLD AUTO: 16.6 % (ref 5–12)
NEUTROPHILS # BLD AUTO: 2 10*3/MM3 (ref 1.7–7)
NEUTROPHILS NFR BLD AUTO: 41.5 % (ref 42.7–76)
NITRITE UR QL STRIP: NEGATIVE
NRBC BLD AUTO-RTO: 0 /100 WBC (ref 0–0.2)
PH UR STRIP.AUTO: 5.5 [PH] (ref 4.5–8)
PLATELET # BLD AUTO: 156 10*3/MM3 (ref 140–450)
PMV BLD AUTO: 9.5 FL (ref 6–12)
POTASSIUM BLD-SCNC: 4.1 MMOL/L (ref 3.5–4.7)
PROT SERPL-MCNC: 7.9 G/DL (ref 6.3–8)
PROT UR QL STRIP: NEGATIVE
RBC # BLD AUTO: 4.91 10*6/MM3 (ref 4.14–5.8)
SODIUM BLD-SCNC: 138 MMOL/L (ref 134–145)
SP GR UR STRIP: >=1.03 (ref 1–1.03)
UROBILINOGEN UR QL STRIP: NORMAL
WBC NRBC COR # BLD: 4.82 10*3/MM3 (ref 3.4–10.8)

## 2020-06-01 PROCEDURE — 96375 TX/PRO/DX INJ NEW DRUG ADDON: CPT

## 2020-06-01 PROCEDURE — 25010000002 PALONOSETRON PER 25 MCG: Performed by: INTERNAL MEDICINE

## 2020-06-01 PROCEDURE — 96417 CHEMO IV INFUS EACH ADDL SEQ: CPT

## 2020-06-01 PROCEDURE — 99215 OFFICE O/P EST HI 40 MIN: CPT | Performed by: INTERNAL MEDICINE

## 2020-06-01 PROCEDURE — 25010000002 BEVACIZUMAB-AWWB 400 MG/16ML SOLUTION 16 ML VIAL: Performed by: INTERNAL MEDICINE

## 2020-06-01 PROCEDURE — 80053 COMPREHEN METABOLIC PANEL: CPT

## 2020-06-01 PROCEDURE — 96413 CHEMO IV INFUSION 1 HR: CPT

## 2020-06-01 PROCEDURE — 25010000002 FLUOROURACIL PER 500 MG: Performed by: INTERNAL MEDICINE

## 2020-06-01 PROCEDURE — 25010000002 BEVACIZUMAB-AWWB 100 MG/4ML SOLUTION 4 ML VIAL: Performed by: INTERNAL MEDICINE

## 2020-06-01 PROCEDURE — 25010000002 LEUCOVORIN CALCIUM PER 50 MG: Performed by: INTERNAL MEDICINE

## 2020-06-01 PROCEDURE — 25010000002 DEXAMETHASONE SODIUM PHOSPHATE 100 MG/10ML SOLUTION: Performed by: INTERNAL MEDICINE

## 2020-06-01 PROCEDURE — 96368 THER/DIAG CONCURRENT INF: CPT

## 2020-06-01 PROCEDURE — 96411 CHEMO IV PUSH ADDL DRUG: CPT

## 2020-06-01 PROCEDURE — 96416 CHEMO PROLONG INFUSE W/PUMP: CPT

## 2020-06-01 PROCEDURE — 85025 COMPLETE CBC W/AUTO DIFF WBC: CPT

## 2020-06-01 PROCEDURE — 96415 CHEMO IV INFUSION ADDL HR: CPT

## 2020-06-01 PROCEDURE — 82378 CARCINOEMBRYONIC ANTIGEN: CPT | Performed by: INTERNAL MEDICINE

## 2020-06-01 PROCEDURE — 25010000002 OXALIPLATIN PER 0.5 MG: Performed by: INTERNAL MEDICINE

## 2020-06-01 PROCEDURE — 81003 URINALYSIS AUTO W/O SCOPE: CPT

## 2020-06-01 RX ORDER — DEXTROSE MONOHYDRATE 50 MG/ML
250 INJECTION, SOLUTION INTRAVENOUS ONCE
Status: CANCELLED | OUTPATIENT
Start: 2020-06-01

## 2020-06-01 RX ORDER — FLUOROURACIL 50 MG/ML
400 INJECTION, SOLUTION INTRAVENOUS ONCE
Status: COMPLETED | OUTPATIENT
Start: 2020-06-01 | End: 2020-06-01

## 2020-06-01 RX ORDER — DIPHENHYDRAMINE HYDROCHLORIDE 50 MG/ML
50 INJECTION INTRAMUSCULAR; INTRAVENOUS AS NEEDED
Status: CANCELLED | OUTPATIENT
Start: 2020-06-01

## 2020-06-01 RX ORDER — PALONOSETRON 0.05 MG/ML
0.25 INJECTION, SOLUTION INTRAVENOUS ONCE
Status: CANCELLED | OUTPATIENT
Start: 2020-06-15

## 2020-06-01 RX ORDER — SODIUM CHLORIDE 9 MG/ML
250 INJECTION, SOLUTION INTRAVENOUS ONCE
Status: COMPLETED | OUTPATIENT
Start: 2020-06-01 | End: 2020-06-01

## 2020-06-01 RX ORDER — FAMOTIDINE 10 MG/ML
20 INJECTION, SOLUTION INTRAVENOUS AS NEEDED
Status: CANCELLED | OUTPATIENT
Start: 2020-06-01

## 2020-06-01 RX ORDER — FLUOROURACIL 50 MG/ML
400 INJECTION, SOLUTION INTRAVENOUS ONCE
Status: CANCELLED | OUTPATIENT
Start: 2020-06-15

## 2020-06-01 RX ORDER — SODIUM CHLORIDE 9 MG/ML
250 INJECTION, SOLUTION INTRAVENOUS ONCE
Status: CANCELLED | OUTPATIENT
Start: 2020-06-15

## 2020-06-01 RX ORDER — DEXTROSE MONOHYDRATE 50 MG/ML
250 INJECTION, SOLUTION INTRAVENOUS ONCE
Status: CANCELLED | OUTPATIENT
Start: 2020-06-15

## 2020-06-01 RX ORDER — DEXTROSE MONOHYDRATE 50 MG/ML
250 INJECTION, SOLUTION INTRAVENOUS ONCE
Status: COMPLETED | OUTPATIENT
Start: 2020-06-01 | End: 2020-06-01

## 2020-06-01 RX ORDER — DIPHENHYDRAMINE HYDROCHLORIDE 50 MG/ML
50 INJECTION INTRAMUSCULAR; INTRAVENOUS AS NEEDED
Status: CANCELLED | OUTPATIENT
Start: 2020-06-15

## 2020-06-01 RX ORDER — FAMOTIDINE 10 MG/ML
20 INJECTION, SOLUTION INTRAVENOUS AS NEEDED
Status: CANCELLED | OUTPATIENT
Start: 2020-06-15

## 2020-06-01 RX ORDER — PALONOSETRON 0.05 MG/ML
0.25 INJECTION, SOLUTION INTRAVENOUS ONCE
Status: COMPLETED | OUTPATIENT
Start: 2020-06-01 | End: 2020-06-01

## 2020-06-01 RX ORDER — FLUOROURACIL 50 MG/ML
400 INJECTION, SOLUTION INTRAVENOUS ONCE
Status: CANCELLED | OUTPATIENT
Start: 2020-06-01

## 2020-06-01 RX ORDER — SODIUM CHLORIDE 9 MG/ML
250 INJECTION, SOLUTION INTRAVENOUS ONCE
Status: CANCELLED | OUTPATIENT
Start: 2020-06-01

## 2020-06-01 RX ORDER — PALONOSETRON 0.05 MG/ML
0.25 INJECTION, SOLUTION INTRAVENOUS ONCE
Status: CANCELLED | OUTPATIENT
Start: 2020-06-01

## 2020-06-01 RX ADMIN — FLUOROURACIL 5400 MG: 50 INJECTION, SOLUTION INTRAVENOUS at 12:35

## 2020-06-01 RX ADMIN — BEVACIZUMAB-AWWB 500 MG: 400 INJECTION, SOLUTION INTRAVENOUS at 09:51

## 2020-06-01 RX ADMIN — DEXAMETHASONE SODIUM PHOSPHATE 12 MG: 10 INJECTION, SOLUTION INTRAMUSCULAR; INTRAVENOUS at 09:21

## 2020-06-01 RX ADMIN — DEXTROSE 250 ML: 5 SOLUTION INTRAVENOUS at 10:27

## 2020-06-01 RX ADMIN — FLUOROURACIL 900 MG: 50 INJECTION, SOLUTION INTRAVENOUS at 12:35

## 2020-06-01 RX ADMIN — SODIUM CHLORIDE 250 ML: 9 INJECTION, SOLUTION INTRAVENOUS at 09:21

## 2020-06-01 RX ADMIN — OXALIPLATIN 190 MG: 5 INJECTION, SOLUTION, CONCENTRATE INTRAVENOUS at 10:28

## 2020-06-01 RX ADMIN — LEUCOVORIN CALCIUM 900 MG: 350 INJECTION, POWDER, LYOPHILIZED, FOR SOLUTION INTRAMUSCULAR; INTRAVENOUS at 10:27

## 2020-06-01 RX ADMIN — PALONOSETRON HYDROCHLORIDE 0.25 MG: 0.25 INJECTION, SOLUTION INTRAVENOUS at 09:21

## 2020-06-01 NOTE — PROGRESS NOTES
Staff message rec from Dr Georges-pt did not reorder his Zarxio and he is to continue. See below.    Marty Georges MD sent to Rosalva Montemayor.             He didn't know that he was to continue the Zarxio, so he didn't reorder.  He needs to continue this please.     Thanks!  NIKITA Poole contacted and asked for the scheduling team reach out to pt.

## 2020-06-01 NOTE — TELEPHONE ENCOUNTER
----- Message from Jaimee Camarena RN sent at 6/1/2020 12:40 PM EDT -----  Regarding: chemo unhook  Pt  Needs unhook on Wednesday at 11:00 am please.  He is aware.

## 2020-06-02 ENCOUNTER — TELEPHONE (OUTPATIENT)
Dept: ONCOLOGY | Facility: CLINIC | Age: 50
End: 2020-06-02

## 2020-06-02 NOTE — TELEPHONE ENCOUNTER
HUB call from Mara at [facility] forwarded to me.    I contacted [facility] and spoke to Aarti. The Zarxio will be delivered to the office tomorrow, 6/3/2020. Pt has an appt tomorrow as well for unhook and will pick it up then.

## 2020-06-02 NOTE — TELEPHONE ENCOUNTER
Radha with [email or web address] wanting to schedule delivery of Zarxio 480 MCG syringe for this patient. Would like callback in regards to this at [phone]

## 2020-06-03 ENCOUNTER — INFUSION (OUTPATIENT)
Dept: ONCOLOGY | Facility: HOSPITAL | Age: 50
End: 2020-06-03

## 2020-06-03 DIAGNOSIS — C18.7 PRIMARY MALIGNANT NEOPLASM OF SIGMOID COLON (HCC): Primary | ICD-10-CM

## 2020-06-03 DIAGNOSIS — Z45.2 ENCOUNTER FOR FITTING AND ADJUSTMENT OF VASCULAR CATHETER: ICD-10-CM

## 2020-06-03 PROCEDURE — 25010000003 HEPARIN LOCK FLUSH PER 10 UNITS: Performed by: INTERNAL MEDICINE

## 2020-06-03 RX ORDER — HEPARIN SODIUM (PORCINE) LOCK FLUSH IV SOLN 100 UNIT/ML 100 UNIT/ML
500 SOLUTION INTRAVENOUS AS NEEDED
Status: DISCONTINUED | OUTPATIENT
Start: 2020-06-03 | End: 2020-06-03 | Stop reason: HOSPADM

## 2020-06-03 RX ORDER — SODIUM CHLORIDE 0.9 % (FLUSH) 0.9 %
10 SYRINGE (ML) INJECTION AS NEEDED
Status: DISCONTINUED | OUTPATIENT
Start: 2020-06-03 | End: 2020-06-03 | Stop reason: HOSPADM

## 2020-06-03 RX ORDER — SODIUM CHLORIDE 0.9 % (FLUSH) 0.9 %
10 SYRINGE (ML) INJECTION AS NEEDED
Status: CANCELLED | OUTPATIENT
Start: 2020-06-03

## 2020-06-03 RX ORDER — HEPARIN SODIUM (PORCINE) LOCK FLUSH IV SOLN 100 UNIT/ML 100 UNIT/ML
500 SOLUTION INTRAVENOUS AS NEEDED
Status: CANCELLED | OUTPATIENT
Start: 2020-06-03

## 2020-06-03 RX ADMIN — Medication 500 UNITS: at 12:06

## 2020-06-03 RX ADMIN — SODIUM CHLORIDE, PRESERVATIVE FREE 10 ML: 5 INJECTION INTRAVENOUS at 12:05

## 2020-06-12 NOTE — PROGRESS NOTES
[facility] GROUP OUTPATIENT FOLLOW UP CLINIC VISIT    REASON FOR FOLLOW-UP:    1.  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  Palliative therapy with FOLFIRI initiated on 11/4/2019.  3. Evidence of disease progression following 4 cycles of FOLFIRI.  4.  FOLFOX+bevacizumab initiated 1/6/2020    HISTORY OF PRESENT ILLNESS:  Silver Sloan is a 50 y.o. male who returns today for follow up of the above issue, anticipating cycle 8 of FOLFOX+frank.    Treatment was delayed last week due to neutropenia.  No fevers or chills.    He recently had some acute abdominal pain.  This resolved after a few hours.  He is now taking his stool softeners more frequently with more frequent bowel movements and has not had recurrence of the pain.  He remains fatigued.  No skin changes.  No mucositis.  No bleeding.    ONCOLOGIC HISTORY:  He lives in [facility].  He presented to [facility] for further care.  CT imaging indicated liver lesions.  He had a CT-guided liver biopsy performed on 10/14/2019 with pathology consistent with adenocarcinoma metastatic from colon.  He had a PET scan performed on 10/18/2019 which revealed a 2.7 cm mass in the sigmoid colon thought to be the primary malignancy.  The liver is enlarged measuring 22 cm.  There is a 14.3 cm mass in the right lobe of the liver with an SUV of 6.52 and an 11 cm mass in the left lobe of the liver with an SUV of 6.38.  There is a 1.5 cm lesion in the inferior right lobe of the liver with an SUV of 4.39.  There was no other evidence for metastatic disease.  There is no lymphadenopathy in the neck chest abdomen pelvis or retroperitoneum.  Multinodular goiter with thyromegaly was visualized.  No bowel obstruction was visualized.  An umbilical hernia and a left inguinal hernia were visualized.  There is some degenerative disc disease in the lumbar spine.     He saw an oncologist in [place].  He has family that  lives here in [place] and he believes that he wants to be treated here in [place].  A Mediport is scheduled in [place] on Friday.     His right upper quadrant abdominal pain is adequately controlled with oxycodone 5 mg tablets 1-2 every 6 hours as needed.  He does continue to have some sharp right lower chest pain with deep breaths.  He has nausea that is well controlled with Zofran and Phenergan.  He is having bowel movements with a bowel regimen.  He has lost about 25 pounds over the past month or so.    He was initially seen in consultation on 10/22/2019.  He wanted to see a surgical oncologist and was referred to Dr. Painting who agreed with initiating therapy with chemotherapy first with surgical resection possible at a later date.  He was referred for colonoscopy and this was done at the [facility].    He initiated palliative FOLFIRI on 11/4/2019.    Cycle #2 initiated on 11/18/2019.  Mild reaction.  We continue 50 mg of Benadryl with each cycle.    CT imaging on 12/23/2019 with progression.      Neutropenic on 12/30/2019 requiring a delay in therapy.      CEA 4399 on 10/22/2019 --> 6735 on 1/6/2020    FOLFOX+frank initiated 1/6/2020    Scans after 5 cycles of therapy on 3/9/2020 show a good response to therapy with a decrease in the liver metastases.    Cycle #6 initiated 3/16/2020.    ALLERGIES:  No Known Allergies    MEDICATIONS:  The medication list has been reviewed with the patient by the medical assistant, and the list has been updated in the electronic medical record, which I reviewed.  Medication dosages and frequencies were confirmed to be accurate.    I have reviewed the patient's medical history in detail and updated the computerized patient record.    Review of Systems   Constitutional: Positive for fatigue. Negative for appetite change, chills and fever.   HENT:   Negative for trouble swallowing.    Respiratory: Negative for cough and shortness of breath.     Cardiovascular: Negative for chest pain and leg swelling.   Gastrointestinal: Positive for abdominal pain (Resolved). Negative for abdominal distention, blood in stool, constipation, diarrhea and nausea.   Musculoskeletal: Negative for arthralgias and myalgias.   Skin: Negative for rash.   Neurological: Negative for dizziness and extremity weakness.   Hematological: Does not bruise/bleed easily.   Psychiatric/Behavioral: Positive for sleep disturbance.   Review of systems 4/13/2020 unchanged from previous office visit except as updated.       There were no vitals filed for this visit.    PHYSICAL EXAMINATION:  GENERAL:  Well-developed well-nourished male; awake, alert and oriented, in no acute distress.    DIAGNOSTIC DATA:  Results for orders placed or performed in visit on 06/01/20   CEA   Result Value Ref Range    CEA 1,879.00 ng/mL   Comprehensive metabolic panel   Result Value Ref Range    Glucose 106 74 - 124 mg/dL    BUN 9 6 - 20 mg/dL    Creatinine 0.97 0.70 - 1.30 mg/dL    Sodium 138 134 - 145 mmol/L    Potassium 4.1 3.5 - 4.7 mmol/L    Chloride 102 98 - 107 mmol/L    CO2 23.9 22.0 - 29.0 mmol/L    Calcium 9.4 8.5 - 10.2 mg/dL    Total Protein 7.9 6.3 - 8.0 g/dL    Albumin 4.10 3.50 - 5.20 g/dL    ALT (SGPT) 19 0 - 41 U/L    AST (SGOT) 25 0 - 40 U/L    Alkaline Phosphatase 91 38 - 116 U/L    Total Bilirubin 0.6 0.2 - 1.2 mg/dL    eGFR Non African Amer 82 >60 mL/min/1.73    eGFR  African Amer 99 >60 mL/min/1.73    Globulin 3.8 (H) 1.8 - 3.5 gm/dL    A/G Ratio 1.1 1.1 - 2.4 g/dL    BUN/Creatinine Ratio 9.3 7.3 - 30.0    Anion Gap 12.1 5.0 - 15.0 mmol/L   Urinalysis without microscopic (no culture) - Urine, Clean Catch   Result Value Ref Range    Color, UA Yellow Yellow, Straw    Appearance, UA Clear Clear    pH, UA 5.5 4.5 - 8.0    Specific Gravity, UA >=1.030 1.002 - 1.030    Glucose, UA Negative Negative    Ketones, UA Negative Negative    Bilirubin, UA Negative Negative    Blood, UA Negative Negative     Protein, UA Negative Negative    Leuk Esterase, UA Negative Negative    Nitrite, UA Negative Negative    Urobilinogen, UA 0.2 E.U./dL 0.2 - 1.0 E.U./dL   CBC Auto Differential   Result Value Ref Range    WBC 4.82 3.40 - 10.80 10*3/mm3    RBC 4.91 4.14 - 5.80 10*6/mm3    Hemoglobin 14.3 13.0 - 17.7 g/dL    Hematocrit 44.8 37.5 - 51.0 %    MCV 91.2 79.0 - 97.0 fL    MCH 29.1 26.6 - 33.0 pg    MCHC 31.9 31.5 - 35.7 g/dL    RDW 17.6 (H) 12.3 - 15.4 %    RDW-SD 58.0 (H) 37.0 - 54.0 fl    MPV 9.5 6.0 - 12.0 fL    Platelets 156 140 - 450 10*3/mm3    Neutrophil % 41.5 (L) 42.7 - 76.0 %    Lymphocyte % 35.7 19.6 - 45.3 %    Monocyte % 16.6 (H) 5.0 - 12.0 %    Eosinophil % 5.4 0.3 - 6.2 %    Basophil % 0.6 0.0 - 1.5 %    Immature Grans % 0.2 0.0 - 0.5 %    Neutrophils, Absolute 2.00 1.70 - 7.00 10*3/mm3    Lymphocytes, Absolute 1.72 0.70 - 3.10 10*3/mm3    Monocytes, Absolute 0.80 0.10 - 0.90 10*3/mm3    Eosinophils, Absolute 0.26 0.00 - 0.40 10*3/mm3    Basophils, Absolute 0.03 0.00 - 0.20 10*3/mm3    Immature Grans, Absolute 0.01 0.00 - 0.05 10*3/mm3    nRBC 0.0 0.0 - 0.2 /100 WBC       IMAGING: Scans after 5 cycles of therapy on 3/9/2020 show a good response to therapy with a decrease in the liver metastases.      ASSESSMENT:  This is a 50 y.o. male with:  1.  Metastatic colon cancer: Extensive metastatic disease in the liver at diagnosis.  No other metastatic disease.  There is a 2.7 cm hypermetabolic lesion in the sigmoid colon on his PET imaging which is likely the primary tumor.  He did have a colonoscopy at the [facility] recently and we are working to get these results.  Mismatch repair intact.  K-lynnette mutation positive so he is not a candidate for Erbitux/vectibix.  BRAF mutation negative.  NRAS mutation negative.  HER-2 and MET negative by FISH.  TRK negative.  He was referred to Dr. Painting to discuss surgical options.  We have elected to proceed with chemotherapy followed by repeat imaging and then  he will be reassessed to see if he is a surgical candidate or if there are any other local therapy options with respect to the liver.  We initially elected for palliative therapy with FOLFIRI.      Genetic testing with two VUS (variants of uncertain significance), one in MSH3 and one in NBN.     CT with progression after four cycle of FOLFIRI.  CEA also increased significantly.    Neutropenic on 12/30/2019.    Cycle 1 FOLFOX + frank on 1/6/2020.      Scans after 5 cycles of therapy on 3/9/2020 show a good response to therapy with a decrease in the liver metastases.    Neutropenia developed prior to cycle #8.  Treatment was held for 1 week.  Blood counts have recovered.  We will add G- mcg for 3 days following each treatment at this point.  He will administer this at home.  We have arranged this for him.    2.  Cancer related pain: He was having worsening pain on oxycodone and therefore this was switched to immediate release morphine.His pain has been reasonably well controlled and he is only required a few doses of the MSIR.    Patient did have an episode of acute abdominal pain 2 weekends ago that resolved after a few hours.  Unclear etiology.  He is now using his stool softeners more frequently and has not had a recurrence of the pain.    3.  Chemotherapy-induced neutropenia.  Blood counts have recovered.  Add G- mcg daily for 3 days following chemotherapy.  He will give these at home after some teaching.    4.  Tachycardia: Heart rate is regular today.    5.  Depression and insomnia: We have increased Zoloft to 100 mg.  He is also utilizing temazepam nightly.    6.  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  He is having no issues with neuropathy without cold exposure.  We will continue to monitor.    7.  1+ urine protein.  Blood pressure within normal limits.  Continue to monitor while on Avastin.    8.  Thrombocytopenia secondary to chemotherapy.  Platelet count has  normalized this week.    PLAN:  1. Proceed with cycle #8 of therapy  2. G- mcg x 3 days following each treatment  3. FOLFOX plus bevacizumab in 2, 4, and 6 weeks  4. CT imaging in 5 weeks and I will see him back in 6 weeks to review  5. Depending on the next scan results and his tolerance to chemotherapy we will need to discuss the possibility of some local therapy with Dr. Painting.    LLOYD Carmona

## 2020-06-14 NOTE — PROGRESS NOTES
[facility] GROUP OUTPATIENT FOLLOW UP CLINIC VISIT    REASON FOR FOLLOW-UP:    1.  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  Palliative therapy with FOLFIRI initiated on 11/4/2019.  3. Evidence of disease progression following 4 cycles of FOLFIRI.  4.  FOLFOX+bevacizumab initiated 1/6/2020  5.  CT 3/9/2020 with improvement  6.  CT 5/28/2020 with further improvement    HISTORY OF PRESENT ILLNESS:  Silver Sloan is a 50 y.o. male who returns today for follow up of the above issue, anticipating cycle 12 of FOLFOX+frank.      He is tolerating treatment reasonably well. Neutropenia has improved with 3 doses of growth-factor support following each treatment. He remains fatigued following chemo but is still able to carry out daily activities. His mood has been better with increased dose of zoloft. He is eating and drinking well. Bowels are moving regularly with a daily regimen of stool softeners.  He does have some mild, intermittent reflux that was taking Pepcid to minimize symptoms.    He does experience expected cold sensitivity related to oxaliplatin.  Unfortunately, with this previous cycle he has developed some mild upper and lower extremity neuropathy.  He notes some slight numbness in his fingertips and this is more pronounced in his feet.  We will need to monitor this closely to ensure that we will not need to reduce doses.    Finally, he did question the possibility of obtaining a therapy dog for him as he is relocating to the local area from [place] and his new residence does not allow animals.  I will contact her  to see if she may be able to provide some assistance.    He has no other concerns today.           ONCOLOGIC HISTORY:  He lives in [facility].  He presented to [facility] for further care.  CT imaging indicated liver lesions.  He had a CT-guided liver biopsy performed on 10/14/2019 with pathology consistent with  adenocarcinoma metastatic from colon.  He had a PET scan performed on 10/18/2019 which revealed a 2.7 cm mass in the sigmoid colon thought to be the primary malignancy.  The liver is enlarged measuring 22 cm.  There is a 14.3 cm mass in the right lobe of the liver with an SUV of 6.52 and an 11 cm mass in the left lobe of the liver with an SUV of 6.38.  There is a 1.5 cm lesion in the inferior right lobe of the liver with an SUV of 4.39.  There was no other evidence for metastatic disease.  There is no lymphadenopathy in the neck chest abdomen pelvis or retroperitoneum.  Multinodular goiter with thyromegaly was visualized.  No bowel obstruction was visualized.  An umbilical hernia and a left inguinal hernia were visualized.  There is some degenerative disc disease in the lumbar spine.     He saw an oncologist in [place].  He has family that lives here in [place] and he believes that he wants to be treated here in [place].  A Mediport is scheduled in [place] on Friday.     His right upper quadrant abdominal pain is adequately controlled with oxycodone 5 mg tablets 1-2 every 6 hours as needed.  He does continue to have some sharp right lower chest pain with deep breaths.  He has nausea that is well controlled with Zofran and Phenergan.  He is having bowel movements with a bowel regimen.  He has lost about 25 pounds over the past month or so.    He was initially seen in consultation on 10/22/2019.  He wanted to see a surgical oncologist and was referred to Dr. Painting who agreed with initiating therapy with chemotherapy first with surgical resection possible at a later date.  He was referred for colonoscopy and this was done at the [facility].    He initiated palliative FOLFIRI on 11/4/2019.    Cycle #2 initiated on 11/18/2019.  Mild reaction.  We continue 50 mg of Benadryl with each cycle.    CT imaging on 12/23/2019 with progression.      Neutropenic on 12/30/2019 requiring a delay in  therapy.      CEA 4399 on 10/22/2019 --> 6735 on 1/6/2020    FOLFOX+frank initiated 1/6/2020    Scans after 5 cycles of therapy on 3/9/2020 show a good response to therapy with a decrease in the liver metastases.    Cycle #6 initiated 3/16/2020.    Scans on 5/28/2020 with improvement.    ALLERGIES:  No Known Allergies    MEDICATIONS:  The medication list has been reviewed with the patient by the medical assistant, and the list has been updated in the electronic medical record, which I reviewed.  Medication dosages and frequencies were confirmed to be accurate.    I have reviewed the patient's medical history in detail and updated the computerized patient record.    Review of Systems   Constitutional: Positive for fatigue. Negative for appetite change, chills and fever.   HENT:   Negative for trouble swallowing.    Respiratory: Negative for cough and shortness of breath.    Cardiovascular: Negative for chest pain and leg swelling.   Gastrointestinal: Negative for abdominal distention, abdominal pain, blood in stool, constipation, diarrhea and nausea.        See HPI, reflux    Musculoskeletal: Negative for arthralgias and myalgias.   Skin: Negative for rash.   Neurological: Negative for dizziness and extremity weakness.        Cold sensitivity.  Some mild neuropathy, see HPI    Hematological: Does not bruise/bleed easily.   Psychiatric/Behavioral: Negative for sleep disturbance.   Review of systems 6/15/2020 unchanged from previous office visit except as updated.       There were no vitals filed for this visit.    PHYSICAL EXAMINATION:  GENERAL:  Well-developed well-nourished male; awake, alert and oriented, in no acute distress.  Wearing a mask today.  SKIN:  Warm and dry, without rashes, purpura, or petechiae.  HEAD:  Normocephalic, atraumatic.  EARS:  Hearing intact.  CHEST:  Lungs are clear to auscultation bilaterally.  No wheezes, rales, or rhonchi.  HEART:  Regular rate; normal rhythm.  No murmurs, gallops or  rubs.  ABDOMEN:  Soft, non-tender, non-distended.  Normal active bowel sounds.  No organomegaly.  EXTREMITIES:  No clubbing cyanosis or edema.  NEUROLOGICAL:  No focal neurologic deficits.    Physical exam unchanged from previous     DIAGNOSTIC DATA:  Results for orders placed or performed in visit on 06/01/20   CEA   Result Value Ref Range    CEA 1,879.00 ng/mL   Comprehensive metabolic panel   Result Value Ref Range    Glucose 106 74 - 124 mg/dL    BUN 9 6 - 20 mg/dL    Creatinine 0.97 0.70 - 1.30 mg/dL    Sodium 138 134 - 145 mmol/L    Potassium 4.1 3.5 - 4.7 mmol/L    Chloride 102 98 - 107 mmol/L    CO2 23.9 22.0 - 29.0 mmol/L    Calcium 9.4 8.5 - 10.2 mg/dL    Total Protein 7.9 6.3 - 8.0 g/dL    Albumin 4.10 3.50 - 5.20 g/dL    ALT (SGPT) 19 0 - 41 U/L    AST (SGOT) 25 0 - 40 U/L    Alkaline Phosphatase 91 38 - 116 U/L    Total Bilirubin 0.6 0.2 - 1.2 mg/dL    eGFR Non African Amer 82 >60 mL/min/1.73    eGFR  African Amer 99 >60 mL/min/1.73    Globulin 3.8 (H) 1.8 - 3.5 gm/dL    A/G Ratio 1.1 1.1 - 2.4 g/dL    BUN/Creatinine Ratio 9.3 7.3 - 30.0    Anion Gap 12.1 5.0 - 15.0 mmol/L   Urinalysis without microscopic (no culture) - Urine, Clean Catch   Result Value Ref Range    Color, UA Yellow Yellow, Straw    Appearance, UA Clear Clear    pH, UA 5.5 4.5 - 8.0    Specific Gravity, UA >=1.030 1.002 - 1.030    Glucose, UA Negative Negative    Ketones, UA Negative Negative    Bilirubin, UA Negative Negative    Blood, UA Negative Negative    Protein, UA Negative Negative    Leuk Esterase, UA Negative Negative    Nitrite, UA Negative Negative    Urobilinogen, UA 0.2 E.U./dL 0.2 - 1.0 E.U./dL   CBC Auto Differential   Result Value Ref Range    WBC 4.82 3.40 - 10.80 10*3/mm3    RBC 4.91 4.14 - 5.80 10*6/mm3    Hemoglobin 14.3 13.0 - 17.7 g/dL    Hematocrit 44.8 37.5 - 51.0 %    MCV 91.2 79.0 - 97.0 fL    MCH 29.1 26.6 - 33.0 pg    MCHC 31.9 31.5 - 35.7 g/dL    RDW 17.6 (H) 12.3 - 15.4 %    RDW-SD 58.0 (H) 37.0 - 54.0 fl     MPV 9.5 6.0 - 12.0 fL    Platelets 156 140 - 450 10*3/mm3    Neutrophil % 41.5 (L) 42.7 - 76.0 %    Lymphocyte % 35.7 19.6 - 45.3 %    Monocyte % 16.6 (H) 5.0 - 12.0 %    Eosinophil % 5.4 0.3 - 6.2 %    Basophil % 0.6 0.0 - 1.5 %    Immature Grans % 0.2 0.0 - 0.5 %    Neutrophils, Absolute 2.00 1.70 - 7.00 10*3/mm3    Lymphocytes, Absolute 1.72 0.70 - 3.10 10*3/mm3    Monocytes, Absolute 0.80 0.10 - 0.90 10*3/mm3    Eosinophils, Absolute 0.26 0.00 - 0.40 10*3/mm3    Basophils, Absolute 0.03 0.00 - 0.20 10*3/mm3    Immature Grans, Absolute 0.01 0.00 - 0.05 10*3/mm3    nRBC 0.0 0.0 - 0.2 /100 WBC       IMAGING: Scans after 10 cycles of therapy on 5/28/2020 show an ongoing good response to therapy with a decrease in the liver metastases.      ASSESSMENT:  This is a 50 y.o. male with:  1.  Metastatic colon cancer: Extensive metastatic disease in the liver at diagnosis.  No other metastatic disease.  There is a 2.7 cm hypermetabolic lesion in the sigmoid colon on his PET imaging which is likely the primary tumor.  He did have a colonoscopy at the [facility] recently and we are working to get these results.  Mismatch repair intact.  K-lynnette mutation positive so he is not a candidate for Erbitux/vectibix.  BRAF mutation negative.  NRAS mutation negative.  HER-2 and MET negative by FISH.  TRK negative.  He was referred to Dr. Painting to discuss surgical options.  We have elected to proceed with chemotherapy followed by repeat imaging and then he will be reassessed to see if he is a surgical candidate or if there are any other local therapy options with respect to the liver.  We initially elected for palliative therapy with FOLFIRI.      Genetic testing with two VUS (variants of uncertain significance), one in MSH3 and one in NBN.     CT with progression after four cycle of FOLFIRI.  CEA also increased significantly.    Neutropenic on 12/30/2019.    Cycle 1 FOLFOX + frank on 1/6/2020.      Scans after 5 cycles  of therapy on 3/9/2020 show a good response to therapy with a decrease in the liver metastases.    Neutropenia developed prior to cycle #8.  Treatment was held for 1 week.  Blood counts recovered.  We added G- mcg for 3 days following each treatment.  Home administration.  Improved.    CT imaging after 10 cycles on 5/28/2020 with ongoing improvement.    The patient is due for cycle #12. Some mild neuropathy, worse with this past cycle.We will need to monitor closely.  We will plan to proceed as scheduled.     2.  Cancer related pain: He was having worsening pain on oxycodone and therefore this was switched to immediate release morphine.  His pain has been reasonably well controlled and he is only required a few doses of the MSIR.  Pain well controlled currently with sufficient bowel regimen.     3.  Chemotherapy-induced neutropenia.  Blood counts have recovered.  G- mcg daily for 3 days following chemotherapy added. He is administering these at home without difficulty. Counts have now been more stable. ANC today 1.91. Total WBC today 4.51.    4.  Tachycardia: Heart rate remains regular today.  States he is drinking more water.    5.  Depression and insomnia: We have increased Zoloft to 100 mg.  He is also utilizing temazepam nightly. General improvement. He is also spending more time outdoors which has been beneficial    6.  Cold sensitivity related to oxaliplatin:.  He does experience cold sensitivity for several days following treatment and has now noted some mild numbness of his fingertips that is more pronounced in his bilateral feet.  We will need to monitor this very closely and perhaps consider omission of Oxaliplatin. We would then proceed with Avastin and 5FU only    7.  Urine protein..  Blood pressure is acceptable at 128/87 today.  Continue to monitor while on Avastin.  Protein is currently pending today as the patient was unable to void at his initial lab appointment.  I will follow-up on  this.    8.  Thrombocytopenia secondary to chemotherapy.  Platelet count today is 114,000 in the absence of excess bleeding and bruising.  Continue to monitor.    9.  Neutropenia related to chemotherapy:  GCSF x x 3 days after chemotherapy.  He administers this at home.    PLAN:  1. Proceed with cycle #12 of therapy.  Continue oxaliplatin until the development of neurotoxicity.  At that time, discontinue oxaliplatin and proceed with 5-FU and bevacizumab only.  He has developed some mild neuropathy.  This is not interfering with his daily activities whatsoever.  We will be monitoring this very closely.  2. Continue G- mcg x 3 days following each treatment  3. The patient will be contacting Dr. Painting within the next few weeks to discuss local treatment options  4. Return every 2 weeks for therapy.  Nurse practitioner visit in 2 weeks with me. MD visit in 4 weeks with Dr. Georges and treatment   5. I have contacted Meghann, our oncology social worker about the possibility of a therapy dog per the patient's request.    -Patient is on drug therapy requiring extensive monitoring.    LLOYD Carmona

## 2020-06-15 ENCOUNTER — OFFICE VISIT (OUTPATIENT)
Dept: ONCOLOGY | Facility: CLINIC | Age: 50
End: 2020-06-15

## 2020-06-15 ENCOUNTER — INFUSION (OUTPATIENT)
Dept: ONCOLOGY | Facility: HOSPITAL | Age: 50
End: 2020-06-15

## 2020-06-15 VITALS
TEMPERATURE: 98 F | DIASTOLIC BLOOD PRESSURE: 87 MMHG | BODY MASS INDEX: 31.36 KG/M2 | RESPIRATION RATE: 16 BRPM | WEIGHT: 231.5 LBS | HEIGHT: 72 IN | HEART RATE: 91 BPM | OXYGEN SATURATION: 98 % | SYSTOLIC BLOOD PRESSURE: 128 MMHG

## 2020-06-15 DIAGNOSIS — D69.59 CHEMOTHERAPY-INDUCED THROMBOCYTOPENIA: ICD-10-CM

## 2020-06-15 DIAGNOSIS — C18.7 PRIMARY MALIGNANT NEOPLASM OF SIGMOID COLON (HCC): ICD-10-CM

## 2020-06-15 DIAGNOSIS — G62.0 DRUG-INDUCED POLYNEUROPATHY (HCC): ICD-10-CM

## 2020-06-15 DIAGNOSIS — T45.1X5A CHEMOTHERAPY-INDUCED THROMBOCYTOPENIA: ICD-10-CM

## 2020-06-15 DIAGNOSIS — C18.7 PRIMARY MALIGNANT NEOPLASM OF SIGMOID COLON (HCC): Primary | ICD-10-CM

## 2020-06-15 LAB
ALBUMIN SERPL-MCNC: 4 G/DL (ref 3.5–5.2)
ALBUMIN/GLOB SERPL: 1.2 G/DL (ref 1.1–2.4)
ALP SERPL-CCNC: 97 U/L (ref 38–116)
ALT SERPL W P-5'-P-CCNC: 19 U/L (ref 0–41)
ANION GAP SERPL CALCULATED.3IONS-SCNC: 9.9 MMOL/L (ref 5–15)
AST SERPL-CCNC: 26 U/L (ref 0–40)
BASOPHILS # BLD AUTO: 0.03 10*3/MM3 (ref 0–0.2)
BASOPHILS NFR BLD AUTO: 0.7 % (ref 0–1.5)
BILIRUB SERPL-MCNC: 0.5 MG/DL (ref 0.2–1.2)
BILIRUB UR QL STRIP: NEGATIVE
BUN BLD-MCNC: 11 MG/DL (ref 6–20)
BUN/CREAT SERPL: 12.1 (ref 7.3–30)
CALCIUM SPEC-SCNC: 9.2 MG/DL (ref 8.5–10.2)
CHLORIDE SERPL-SCNC: 106 MMOL/L (ref 98–107)
CLARITY UR: CLEAR
CO2 SERPL-SCNC: 24.1 MMOL/L (ref 22–29)
COLOR UR: YELLOW
CREAT BLD-MCNC: 0.91 MG/DL (ref 0.7–1.3)
DEPRECATED RDW RBC AUTO: 59.7 FL (ref 37–54)
EOSINOPHIL # BLD AUTO: 0.28 10*3/MM3 (ref 0–0.4)
EOSINOPHIL NFR BLD AUTO: 6.2 % (ref 0.3–6.2)
ERYTHROCYTE [DISTWIDTH] IN BLOOD BY AUTOMATED COUNT: 18.3 % (ref 12.3–15.4)
GFR SERPL CREATININE-BSD FRML MDRD: 107 ML/MIN/1.73
GFR SERPL CREATININE-BSD FRML MDRD: 88 ML/MIN/1.73
GLOBULIN UR ELPH-MCNC: 3.3 GM/DL (ref 1.8–3.5)
GLUCOSE BLD-MCNC: 134 MG/DL (ref 74–124)
GLUCOSE UR STRIP-MCNC: NEGATIVE MG/DL
HCT VFR BLD AUTO: 41.9 % (ref 37.5–51)
HGB BLD-MCNC: 13.4 G/DL (ref 13–17.7)
HGB UR QL STRIP.AUTO: NEGATIVE
IMM GRANULOCYTES # BLD AUTO: 0.03 10*3/MM3 (ref 0–0.05)
IMM GRANULOCYTES NFR BLD AUTO: 0.7 % (ref 0–0.5)
KETONES UR QL STRIP: NEGATIVE
LEUKOCYTE ESTERASE UR QL STRIP.AUTO: NEGATIVE
LYMPHOCYTES # BLD AUTO: 1.61 10*3/MM3 (ref 0.7–3.1)
LYMPHOCYTES NFR BLD AUTO: 35.7 % (ref 19.6–45.3)
MCH RBC QN AUTO: 29.5 PG (ref 26.6–33)
MCHC RBC AUTO-ENTMCNC: 32 G/DL (ref 31.5–35.7)
MCV RBC AUTO: 92.3 FL (ref 79–97)
MONOCYTES # BLD AUTO: 0.65 10*3/MM3 (ref 0.1–0.9)
MONOCYTES NFR BLD AUTO: 14.4 % (ref 5–12)
NEUTROPHILS # BLD AUTO: 1.91 10*3/MM3 (ref 1.7–7)
NEUTROPHILS NFR BLD AUTO: 42.3 % (ref 42.7–76)
NITRITE UR QL STRIP: NEGATIVE
NRBC BLD AUTO-RTO: 0 /100 WBC (ref 0–0.2)
PH UR STRIP.AUTO: 5.5 [PH] (ref 4.5–8)
PLATELET # BLD AUTO: 114 10*3/MM3 (ref 140–450)
PMV BLD AUTO: 8.6 FL (ref 6–12)
POTASSIUM BLD-SCNC: 3.8 MMOL/L (ref 3.5–4.7)
PROT SERPL-MCNC: 7.3 G/DL (ref 6.3–8)
PROT UR QL STRIP: NEGATIVE
RBC # BLD AUTO: 4.54 10*6/MM3 (ref 4.14–5.8)
SODIUM BLD-SCNC: 140 MMOL/L (ref 134–145)
SP GR UR STRIP: >=1.03 (ref 1–1.03)
UROBILINOGEN UR QL STRIP: NORMAL
WBC NRBC COR # BLD: 4.51 10*3/MM3 (ref 3.4–10.8)

## 2020-06-15 PROCEDURE — 25010000002 LEUCOVORIN CALCIUM PER 50 MG: Performed by: INTERNAL MEDICINE

## 2020-06-15 PROCEDURE — 25010000002 BEVACIZUMAB-AWWB 400 MG/16ML SOLUTION 16 ML VIAL: Performed by: INTERNAL MEDICINE

## 2020-06-15 PROCEDURE — 25010000002 OXALIPLATIN PER 0.5 MG: Performed by: INTERNAL MEDICINE

## 2020-06-15 PROCEDURE — 96375 TX/PRO/DX INJ NEW DRUG ADDON: CPT

## 2020-06-15 PROCEDURE — 96417 CHEMO IV INFUS EACH ADDL SEQ: CPT

## 2020-06-15 PROCEDURE — 96415 CHEMO IV INFUSION ADDL HR: CPT

## 2020-06-15 PROCEDURE — 99214 OFFICE O/P EST MOD 30 MIN: CPT | Performed by: NURSE PRACTITIONER

## 2020-06-15 PROCEDURE — 25010000002 BEVACIZUMAB-AWWB 100 MG/4ML SOLUTION 4 ML VIAL: Performed by: INTERNAL MEDICINE

## 2020-06-15 PROCEDURE — 85025 COMPLETE CBC W/AUTO DIFF WBC: CPT

## 2020-06-15 PROCEDURE — 81003 URINALYSIS AUTO W/O SCOPE: CPT

## 2020-06-15 PROCEDURE — 96413 CHEMO IV INFUSION 1 HR: CPT

## 2020-06-15 PROCEDURE — 25010000002 DEXAMETHASONE SODIUM PHOSPHATE 100 MG/10ML SOLUTION: Performed by: INTERNAL MEDICINE

## 2020-06-15 PROCEDURE — 96416 CHEMO PROLONG INFUSE W/PUMP: CPT

## 2020-06-15 PROCEDURE — 80053 COMPREHEN METABOLIC PANEL: CPT

## 2020-06-15 PROCEDURE — 96411 CHEMO IV PUSH ADDL DRUG: CPT

## 2020-06-15 PROCEDURE — 25010000002 PALONOSETRON PER 25 MCG: Performed by: INTERNAL MEDICINE

## 2020-06-15 PROCEDURE — 25010000002 FLUOROURACIL PER 500 MG: Performed by: INTERNAL MEDICINE

## 2020-06-15 PROCEDURE — 96368 THER/DIAG CONCURRENT INF: CPT

## 2020-06-15 RX ORDER — SODIUM CHLORIDE 9 MG/ML
250 INJECTION, SOLUTION INTRAVENOUS ONCE
Status: COMPLETED | OUTPATIENT
Start: 2020-06-15 | End: 2020-06-15

## 2020-06-15 RX ORDER — DEXTROSE MONOHYDRATE 50 MG/ML
250 INJECTION, SOLUTION INTRAVENOUS ONCE
Status: COMPLETED | OUTPATIENT
Start: 2020-06-15 | End: 2020-06-15

## 2020-06-15 RX ORDER — PALONOSETRON 0.05 MG/ML
0.25 INJECTION, SOLUTION INTRAVENOUS ONCE
Status: COMPLETED | OUTPATIENT
Start: 2020-06-15 | End: 2020-06-15

## 2020-06-15 RX ORDER — FLUOROURACIL 50 MG/ML
400 INJECTION, SOLUTION INTRAVENOUS ONCE
Status: COMPLETED | OUTPATIENT
Start: 2020-06-15 | End: 2020-06-15

## 2020-06-15 RX ADMIN — SODIUM CHLORIDE 250 ML: 9 INJECTION, SOLUTION INTRAVENOUS at 09:44

## 2020-06-15 RX ADMIN — OXALIPLATIN 190 MG: 5 INJECTION, SOLUTION, CONCENTRATE INTRAVENOUS at 10:44

## 2020-06-15 RX ADMIN — BEVACIZUMAB-AWWB 500 MG: 400 INJECTION, SOLUTION INTRAVENOUS at 10:02

## 2020-06-15 RX ADMIN — PALONOSETRON 0.25 MG: 0.05 INJECTION, SOLUTION INTRAVENOUS at 09:44

## 2020-06-15 RX ADMIN — FLUOROURACIL 5400 MG: 50 INJECTION, SOLUTION INTRAVENOUS at 12:55

## 2020-06-15 RX ADMIN — DEXTROSE MONOHYDRATE 250 ML: 50 INJECTION, SOLUTION INTRAVENOUS at 10:42

## 2020-06-15 RX ADMIN — LEUCOVORIN CALCIUM 900 MG: 350 INJECTION, POWDER, LYOPHILIZED, FOR SOLUTION INTRAMUSCULAR; INTRAVENOUS at 10:44

## 2020-06-15 RX ADMIN — DEXAMETHASONE SODIUM PHOSPHATE 12 MG: 10 INJECTION, SOLUTION INTRAMUSCULAR; INTRAVENOUS at 09:44

## 2020-06-15 RX ADMIN — FLUOROURACIL 900 MG: 50 INJECTION, SOLUTION INTRAVENOUS at 12:55

## 2020-06-17 ENCOUNTER — INFUSION (OUTPATIENT)
Dept: ONCOLOGY | Facility: HOSPITAL | Age: 50
End: 2020-06-17

## 2020-06-17 ENCOUNTER — APPOINTMENT (OUTPATIENT)
Dept: ONCOLOGY | Facility: HOSPITAL | Age: 50
End: 2020-06-17

## 2020-06-17 ENCOUNTER — TELEPHONE (OUTPATIENT)
Dept: ONCOLOGY | Facility: CLINIC | Age: 50
End: 2020-06-17

## 2020-06-17 DIAGNOSIS — C18.7 PRIMARY MALIGNANT NEOPLASM OF SIGMOID COLON (HCC): Primary | ICD-10-CM

## 2020-06-17 DIAGNOSIS — Z45.2 ENCOUNTER FOR FITTING AND ADJUSTMENT OF VASCULAR CATHETER: ICD-10-CM

## 2020-06-17 PROCEDURE — 25010000003 HEPARIN LOCK FLUSH PER 10 UNITS: Performed by: INTERNAL MEDICINE

## 2020-06-17 RX ORDER — HEPARIN SODIUM (PORCINE) LOCK FLUSH IV SOLN 100 UNIT/ML 100 UNIT/ML
500 SOLUTION INTRAVENOUS AS NEEDED
Status: DISCONTINUED | OUTPATIENT
Start: 2020-06-17 | End: 2020-06-17 | Stop reason: HOSPADM

## 2020-06-17 RX ORDER — SODIUM CHLORIDE 0.9 % (FLUSH) 0.9 %
10 SYRINGE (ML) INJECTION AS NEEDED
Status: DISCONTINUED | OUTPATIENT
Start: 2020-06-17 | End: 2020-06-17 | Stop reason: HOSPADM

## 2020-06-17 RX ORDER — HEPARIN SODIUM (PORCINE) LOCK FLUSH IV SOLN 100 UNIT/ML 100 UNIT/ML
500 SOLUTION INTRAVENOUS AS NEEDED
Status: CANCELLED | OUTPATIENT
Start: 2020-06-17

## 2020-06-17 RX ORDER — SODIUM CHLORIDE 0.9 % (FLUSH) 0.9 %
10 SYRINGE (ML) INJECTION AS NEEDED
Status: CANCELLED | OUTPATIENT
Start: 2020-06-17

## 2020-06-17 RX ADMIN — Medication 500 UNITS: at 11:30

## 2020-06-17 RX ADMIN — SODIUM CHLORIDE, PRESERVATIVE FREE 10 ML: 5 INJECTION INTRAVENOUS at 11:30

## 2020-06-17 NOTE — TELEPHONE ENCOUNTER
PT called to have us send previous PET scans over to his surgical oncologist Dr. Sanjay Painting    Phone number for the office: [phone]

## 2020-06-18 ENCOUNTER — TELEPHONE (OUTPATIENT)
Dept: ONCOLOGY | Facility: CLINIC | Age: 50
End: 2020-06-18

## 2020-06-18 ENCOUNTER — DOCUMENTATION (OUTPATIENT)
Dept: ONCOLOGY | Facility: CLINIC | Age: 50
End: 2020-06-18

## 2020-06-18 NOTE — PROGRESS NOTES
Per message rec'd from Dr Georges- aviker on hold until plan finalized with Dr Painting for surgery.  Avastin removed from treatment plan.

## 2020-06-19 ENCOUNTER — TELEPHONE (OUTPATIENT)
Dept: ONCOLOGY | Facility: CLINIC | Age: 50
End: 2020-06-19

## 2020-06-19 ENCOUNTER — DOCUMENTATION (OUTPATIENT)
Dept: ONCOLOGY | Facility: HOSPITAL | Age: 50
End: 2020-06-19

## 2020-06-19 ENCOUNTER — DOCUMENTATION (OUTPATIENT)
Dept: ONCOLOGY | Facility: CLINIC | Age: 50
End: 2020-06-19

## 2020-06-19 NOTE — NURSING NOTE
Pt called about moving and needing note to allow him to have his dog for emotional support.  Wanting to talk to  about this. I forwarded message to Meghann,  and she is following up with pt about this.

## 2020-06-19 NOTE — PROGRESS NOTES
CSW spoke with patient regarding his need for a letter stating his dog Janusz, provides emotional support for him related to depression.  CSW discussed with patient that usually a therapist would provide such a letter, however, he does not currently have a therapist.  CSW will ask the APRNs if they are able to provide the letter since the antidepressant is prescribed through [facility].  CSW sent an in[employer] message to the APRN pool and copied Dr. Georges.  CSW will follow up with patient.

## 2020-06-19 NOTE — PROGRESS NOTES
CSW contacted patient to let him know a letter regarding his dog being an emotional support animal has been completed by the APRN and can be accessed in [employer]. Patient expressed gratitude.

## 2020-06-29 ENCOUNTER — OFFICE VISIT (OUTPATIENT)
Dept: ONCOLOGY | Facility: CLINIC | Age: 50
End: 2020-06-29

## 2020-06-29 ENCOUNTER — INFUSION (OUTPATIENT)
Dept: ONCOLOGY | Facility: HOSPITAL | Age: 50
End: 2020-06-29

## 2020-06-29 ENCOUNTER — TELEPHONE (OUTPATIENT)
Dept: GENERAL RADIOLOGY | Facility: HOSPITAL | Age: 50
End: 2020-06-29

## 2020-06-29 VITALS
WEIGHT: 230.8 LBS | HEART RATE: 108 BPM | RESPIRATION RATE: 16 BRPM | BODY MASS INDEX: 31.26 KG/M2 | OXYGEN SATURATION: 100 % | DIASTOLIC BLOOD PRESSURE: 85 MMHG | SYSTOLIC BLOOD PRESSURE: 121 MMHG | TEMPERATURE: 97.8 F | HEIGHT: 72 IN

## 2020-06-29 DIAGNOSIS — E87.6 HYPOKALEMIA: ICD-10-CM

## 2020-06-29 DIAGNOSIS — C18.7 PRIMARY MALIGNANT NEOPLASM OF SIGMOID COLON (HCC): Primary | ICD-10-CM

## 2020-06-29 DIAGNOSIS — C18.7 PRIMARY MALIGNANT NEOPLASM OF SIGMOID COLON (HCC): ICD-10-CM

## 2020-06-29 DIAGNOSIS — D70.1 CHEMOTHERAPY INDUCED NEUTROPENIA (HCC): ICD-10-CM

## 2020-06-29 DIAGNOSIS — G62.0 DRUG-INDUCED POLYNEUROPATHY (HCC): ICD-10-CM

## 2020-06-29 DIAGNOSIS — T45.1X5A CHEMOTHERAPY-INDUCED NEUROPATHY (HCC): Primary | ICD-10-CM

## 2020-06-29 DIAGNOSIS — G62.0 CHEMOTHERAPY-INDUCED NEUROPATHY (HCC): Primary | ICD-10-CM

## 2020-06-29 DIAGNOSIS — F32.A DEPRESSION, UNSPECIFIED DEPRESSION TYPE: ICD-10-CM

## 2020-06-29 DIAGNOSIS — E87.1 HYPONATREMIA: ICD-10-CM

## 2020-06-29 DIAGNOSIS — T45.1X5A CHEMOTHERAPY INDUCED NEUTROPENIA (HCC): ICD-10-CM

## 2020-06-29 LAB
ALBUMIN SERPL-MCNC: 4.1 G/DL (ref 3.5–5.2)
ALBUMIN/GLOB SERPL: 1.1 G/DL
ALP SERPL-CCNC: 91 U/L (ref 39–117)
ALT SERPL W P-5'-P-CCNC: 20 U/L (ref 1–41)
ANION GAP SERPL CALCULATED.3IONS-SCNC: 9.8 MMOL/L (ref 5–15)
AST SERPL-CCNC: 24 U/L (ref 1–40)
BASOPHILS # BLD AUTO: 0.06 10*3/MM3 (ref 0–0.2)
BASOPHILS NFR BLD AUTO: 1.1 % (ref 0–1.5)
BILIRUB SERPL-MCNC: 0.7 MG/DL (ref 0.2–1.2)
BUN BLD-MCNC: 10 MG/DL (ref 6–20)
BUN/CREAT SERPL: 12.7 (ref 7–25)
CALCIUM SPEC-SCNC: 9.3 MG/DL (ref 8.6–10.5)
CHLORIDE SERPL-SCNC: 99 MMOL/L (ref 98–107)
CO2 SERPL-SCNC: 23.2 MMOL/L (ref 22–29)
CREAT BLD-MCNC: 0.79 MG/DL (ref 0.76–1.27)
DEPRECATED RDW RBC AUTO: 58.4 FL (ref 37–54)
EOSINOPHIL # BLD AUTO: 0.21 10*3/MM3 (ref 0–0.4)
EOSINOPHIL NFR BLD AUTO: 3.7 % (ref 0.3–6.2)
ERYTHROCYTE [DISTWIDTH] IN BLOOD BY AUTOMATED COUNT: 18 % (ref 12.3–15.4)
GFR SERPL CREATININE-BSD FRML MDRD: 104 ML/MIN/1.73
GFR SERPL CREATININE-BSD FRML MDRD: 126 ML/MIN/1.73
GLOBULIN UR ELPH-MCNC: 3.6 GM/DL
GLUCOSE BLD-MCNC: 99 MG/DL (ref 65–99)
HCT VFR BLD AUTO: 43.2 % (ref 37.5–51)
HGB BLD-MCNC: 14.1 G/DL (ref 13–17.7)
IMM GRANULOCYTES # BLD AUTO: 0.05 10*3/MM3 (ref 0–0.05)
IMM GRANULOCYTES NFR BLD AUTO: 0.9 % (ref 0–0.5)
LYMPHOCYTES # BLD AUTO: 1.66 10*3/MM3 (ref 0.7–3.1)
LYMPHOCYTES NFR BLD AUTO: 29.3 % (ref 19.6–45.3)
MCH RBC QN AUTO: 29.6 PG (ref 26.6–33)
MCHC RBC AUTO-ENTMCNC: 32.6 G/DL (ref 31.5–35.7)
MCV RBC AUTO: 90.8 FL (ref 79–97)
MONOCYTES # BLD AUTO: 0.87 10*3/MM3 (ref 0.1–0.9)
MONOCYTES NFR BLD AUTO: 15.4 % (ref 5–12)
NEUTROPHILS # BLD AUTO: 2.81 10*3/MM3 (ref 1.7–7)
NEUTROPHILS NFR BLD AUTO: 49.6 % (ref 42.7–76)
NRBC BLD AUTO-RTO: 0 /100 WBC (ref 0–0.2)
PLATELET # BLD AUTO: 136 10*3/MM3 (ref 140–450)
PMV BLD AUTO: 10.4 FL (ref 6–12)
POTASSIUM BLD-SCNC: 3.4 MMOL/L (ref 3.5–5.2)
PROT SERPL-MCNC: 7.7 G/DL (ref 6–8.5)
RBC # BLD AUTO: 4.76 10*6/MM3 (ref 4.14–5.8)
SODIUM BLD-SCNC: 132 MMOL/L (ref 136–145)
WBC NRBC COR # BLD: 5.66 10*3/MM3 (ref 3.4–10.8)

## 2020-06-29 PROCEDURE — 25010000002 PALONOSETRON PER 25 MCG: Performed by: NURSE PRACTITIONER

## 2020-06-29 PROCEDURE — 25010000002 LEUCOVORIN CALCIUM PER 50 MG: Performed by: NURSE PRACTITIONER

## 2020-06-29 PROCEDURE — 96416 CHEMO PROLONG INFUSE W/PUMP: CPT

## 2020-06-29 PROCEDURE — 99215 OFFICE O/P EST HI 40 MIN: CPT | Performed by: NURSE PRACTITIONER

## 2020-06-29 PROCEDURE — 80053 COMPREHEN METABOLIC PANEL: CPT | Performed by: INTERNAL MEDICINE

## 2020-06-29 PROCEDURE — 96366 THER/PROPH/DIAG IV INF ADDON: CPT

## 2020-06-29 PROCEDURE — 25010000002 DEXAMETHASONE SODIUM PHOSPHATE 100 MG/10ML SOLUTION: Performed by: NURSE PRACTITIONER

## 2020-06-29 PROCEDURE — 96375 TX/PRO/DX INJ NEW DRUG ADDON: CPT

## 2020-06-29 PROCEDURE — 96367 TX/PROPH/DG ADDL SEQ IV INF: CPT

## 2020-06-29 PROCEDURE — 96409 CHEMO IV PUSH SNGL DRUG: CPT

## 2020-06-29 PROCEDURE — 25010000002 FLUOROURACIL PER 500 MG: Performed by: NURSE PRACTITIONER

## 2020-06-29 PROCEDURE — 85025 COMPLETE CBC W/AUTO DIFF WBC: CPT

## 2020-06-29 RX ORDER — FAMOTIDINE 10 MG/ML
20 INJECTION, SOLUTION INTRAVENOUS AS NEEDED
Status: CANCELLED | OUTPATIENT
Start: 2020-06-29

## 2020-06-29 RX ORDER — SODIUM CHLORIDE 9 MG/ML
250 INJECTION, SOLUTION INTRAVENOUS ONCE
Status: COMPLETED | OUTPATIENT
Start: 2020-06-29 | End: 2020-06-29

## 2020-06-29 RX ORDER — PALONOSETRON 0.05 MG/ML
0.25 INJECTION, SOLUTION INTRAVENOUS ONCE
Status: CANCELLED | OUTPATIENT
Start: 2020-06-29

## 2020-06-29 RX ORDER — SODIUM CHLORIDE 9 MG/ML
250 INJECTION, SOLUTION INTRAVENOUS ONCE
Status: DISCONTINUED | OUTPATIENT
Start: 2020-06-29 | End: 2020-06-29 | Stop reason: HOSPADM

## 2020-06-29 RX ORDER — FLUOROURACIL 50 MG/ML
400 INJECTION, SOLUTION INTRAVENOUS ONCE
Status: CANCELLED | OUTPATIENT
Start: 2020-06-29

## 2020-06-29 RX ORDER — FLUOROURACIL 50 MG/ML
400 INJECTION, SOLUTION INTRAVENOUS ONCE
Status: COMPLETED | OUTPATIENT
Start: 2020-06-29 | End: 2020-06-29

## 2020-06-29 RX ORDER — SODIUM CHLORIDE 9 MG/ML
250 INJECTION, SOLUTION INTRAVENOUS ONCE
Status: CANCELLED | OUTPATIENT
Start: 2020-06-29

## 2020-06-29 RX ORDER — PALONOSETRON 0.05 MG/ML
0.25 INJECTION, SOLUTION INTRAVENOUS ONCE
Status: COMPLETED | OUTPATIENT
Start: 2020-06-29 | End: 2020-06-29

## 2020-06-29 RX ORDER — GABAPENTIN 100 MG/1
CAPSULE ORAL
Qty: 30 CAPSULE | Refills: 0 | Status: SHIPPED | OUTPATIENT
Start: 2020-06-29 | End: 2020-08-24

## 2020-06-29 RX ORDER — SODIUM CHLORIDE 9 MG/ML
250 INJECTION, SOLUTION INTRAVENOUS ONCE
Status: CANCELLED
Start: 2020-06-29

## 2020-06-29 RX ORDER — DIPHENHYDRAMINE HYDROCHLORIDE 50 MG/ML
50 INJECTION INTRAMUSCULAR; INTRAVENOUS AS NEEDED
Status: CANCELLED | OUTPATIENT
Start: 2020-06-29

## 2020-06-29 RX ADMIN — FLUOROURACIL 900 MG: 50 INJECTION, SOLUTION INTRAVENOUS at 10:11

## 2020-06-29 RX ADMIN — FLUOROURACIL 5400 MG: 50 INJECTION, SOLUTION INTRAVENOUS at 10:18

## 2020-06-29 RX ADMIN — DEXAMETHASONE SODIUM PHOSPHATE 12 MG: 10 INJECTION, SOLUTION INTRAMUSCULAR; INTRAVENOUS at 09:12

## 2020-06-29 RX ADMIN — SODIUM CHLORIDE 250 ML: 9 INJECTION, SOLUTION INTRAVENOUS at 09:12

## 2020-06-29 RX ADMIN — LEUCOVORIN CALCIUM 900 MG: 350 INJECTION, POWDER, LYOPHILIZED, FOR SOLUTION INTRAMUSCULAR; INTRAVENOUS at 09:29

## 2020-06-29 RX ADMIN — PALONOSETRON HYDROCHLORIDE 0.25 MG: 0.25 INJECTION, SOLUTION INTRAVENOUS at 09:12

## 2020-06-29 NOTE — PROGRESS NOTES
"[facility] GROUP OUTPATIENT FOLLOW UP CLINIC VISIT    REASON FOR FOLLOW-UP:    1.  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  Palliative therapy with FOLFIRI initiated on 11/4/2019.  3. Evidence of disease progression following 4 cycles of FOLFIRI.  4.  FOLFOX+bevacizumab initiated 1/6/2020  5.  CT 3/9/2020 with improvement  6.  CT 5/28/2020 with further improvement    HISTORY OF PRESENT ILLNESS:  Silver Sloan is a 50 y.o. male who returns today for follow up of the above issue, anticipating cycle 13 of FOLFOX+frank.      He has had a particularly difficult week mentally.  He is feeling the impact of the pandemic and being isolated.  He has no thoughts of harming himself though just feels \"down.\"  Thankfully, he was able to get approved for a therapy dog at his new residence, which does not typically allow animals.  This has been quite helpful to him mentally.  He does have persistent fatigue following treatment.. Neutropenia has not recurred with the addition of 3 days of growth-factor support following each treatment. He self-administers at home.     Bowels are regularly with daily stool softeners. Reflux is well controlled with pepcid.     He has experienced some expected cold-sensitivity related to the oxaliplatin but more recently has noted some residual neuropathy of his upper and lower extremities.  With his previous cycle, the neuropathy has progressed to the point where he has been having difficulty opening bottles.  He has also noted discomfort in his feet which is actually interfering with his daily activities.  He has developed grade 1 hand-and-foot syndrome with some mild blistering and skin discoloration of his bilateral hands.  He is applying lotion liberally multiple times a day to help reduce this.    The patient did recently meet with Dr. Painting for discussion of upcoming surgery.  He underwent a repeat CT scan this past weekend and is awaiting the results " with subsequent contact by Dr. Painting.  In preparation for surgery, subsequent doses of Avastin has been on hold.     His CBC is stable today with improvement of his platelets 236,000 in the absence of excess bleeding and bruising.  Of note, the patient is mildly hyponatremic with a sodium level of 132 and his potassium level is also minimally low at 3.4.    He has no other concerns.             ONCOLOGIC HISTORY:  He lives in [facility].  He presented to [facility] for further care.  CT imaging indicated liver lesions.  He had a CT-guided liver biopsy performed on 10/14/2019 with pathology consistent with adenocarcinoma metastatic from colon.  He had a PET scan performed on 10/18/2019 which revealed a 2.7 cm mass in the sigmoid colon thought to be the primary malignancy.  The liver is enlarged measuring 22 cm.  There is a 14.3 cm mass in the right lobe of the liver with an SUV of 6.52 and an 11 cm mass in the left lobe of the liver with an SUV of 6.38.  There is a 1.5 cm lesion in the inferior right lobe of the liver with an SUV of 4.39.  There was no other evidence for metastatic disease.  There is no lymphadenopathy in the neck chest abdomen pelvis or retroperitoneum.  Multinodular goiter with thyromegaly was visualized.  No bowel obstruction was visualized.  An umbilical hernia and a left inguinal hernia were visualized.  There is some degenerative disc disease in the lumbar spine.     He saw an oncologist in [place].  He has family that lives here in [place] and he believes that he wants to be treated here in [place].  A Mediport is scheduled in [place] on Friday.     His right upper quadrant abdominal pain is adequately controlled with oxycodone 5 mg tablets 1-2 every 6 hours as needed.  He does continue to have some sharp right lower chest pain with deep breaths.  He has nausea that is well controlled with Zofran and Phenergan.  He is having bowel movements  with a bowel regimen.  He has lost about 25 pounds over the past month or so.    He was initially seen in consultation on 10/22/2019.  He wanted to see a surgical oncologist and was referred to Dr. Painting who agreed with initiating therapy with chemotherapy first with surgical resection possible at a later date.  He was referred for colonoscopy and this was done at the [facility].    He initiated palliative FOLFIRI on 11/4/2019.    Cycle #2 initiated on 11/18/2019.  Mild reaction.  We continue 50 mg of Benadryl with each cycle.    CT imaging on 12/23/2019 with progression.      Neutropenic on 12/30/2019 requiring a delay in therapy.      CEA 4399 on 10/22/2019 --> 6735 on 1/6/2020    FOLFOX+frank initiated 1/6/2020    Scans after 5 cycles of therapy on 3/9/2020 show a good response to therapy with a decrease in the liver metastases.    Cycle #6 initiated 3/16/2020.    Scans on 5/28/2020 with improvement.    ALLERGIES:  No Known Allergies    MEDICATIONS:  The medication list has been reviewed with the patient by the medical assistant, and the list has been updated in the electronic medical record, which I reviewed.  Medication dosages and frequencies were confirmed to be accurate.    I have reviewed the patient's medical history in detail and updated the computerized patient record.    Review of Systems   Constitutional: Positive for fatigue. Negative for appetite change, chills and fever.   HENT:   Negative for trouble swallowing.    Respiratory: Negative for cough and shortness of breath.    Cardiovascular: Negative for chest pain and leg swelling.   Gastrointestinal: Negative for abdominal distention, abdominal pain, blood in stool, constipation, diarrhea and nausea.        See HPI, reflux    Musculoskeletal: Negative for arthralgias and myalgias.   Skin: Negative for rash.   Neurological: Negative for dizziness and extremity weakness.        Cold sensitivity.  Progressive neuropathy, See HPI     Hematological: Does not bruise/bleed easily.   Psychiatric/Behavioral: Positive for depression (see HPI ). Negative for sleep disturbance.         There were no vitals filed for this visit.    PHYSICAL EXAMINATION:  GENERAL:  Appears tired male; awake, alert and oriented, in no acute distress.  Wearing a mask today.  SKIN:  Warm and dry, without rashes, purpura, or petechiae.  HEAD:  Normocephalic, atraumatic.  EARS:  Hearing intact.  CHEST:  Lungs are clear to auscultation bilaterally.  No wheezes, rales, or rhonchi.  HEART:  Regular rate; normal rhythm.  No murmurs, gallops or rubs.  ABDOMEN:  Soft, non-tender, non-distended.  Normal active bowel sounds.  No organomegaly.  EXTREMITIES:  No clubbing cyanosis or edema.  SKIN: Grade 1 hand foot syndrome of bilateral hands with mild discoloration and one blister on left middle finger   NEUROLOGICAL:  No focal neurologic deficits.Experiencing some difficulty with ambulation related to neuropathy          DIAGNOSTIC DATA:  Results for orders placed or performed in visit on 06/15/20   Comprehensive metabolic panel   Result Value Ref Range    Glucose 134 (H) 74 - 124 mg/dL    BUN 11 6 - 20 mg/dL    Creatinine 0.91 0.70 - 1.30 mg/dL    Sodium 140 134 - 145 mmol/L    Potassium 3.8 3.5 - 4.7 mmol/L    Chloride 106 98 - 107 mmol/L    CO2 24.1 22.0 - 29.0 mmol/L    Calcium 9.2 8.5 - 10.2 mg/dL    Total Protein 7.3 6.3 - 8.0 g/dL    Albumin 4.00 3.50 - 5.20 g/dL    ALT (SGPT) 19 0 - 41 U/L    AST (SGOT) 26 0 - 40 U/L    Alkaline Phosphatase 97 38 - 116 U/L    Total Bilirubin 0.5 0.2 - 1.2 mg/dL    eGFR Non African Amer 88 >60 mL/min/1.73    eGFR  African Amer 107 >60 mL/min/1.73    Globulin 3.3 1.8 - 3.5 gm/dL    A/G Ratio 1.2 1.1 - 2.4 g/dL    BUN/Creatinine Ratio 12.1 7.3 - 30.0    Anion Gap 9.9 5.0 - 15.0 mmol/L   Urinalysis without microscopic (no culture) - Urine, Clean Catch   Result Value Ref Range    Color, UA Yellow Yellow, Straw    Appearance, UA Clear Clear    pH,  UA 5.5 4.5 - 8.0    Specific Gravity, UA >=1.030 1.002 - 1.030    Glucose, UA Negative Negative    Ketones, UA Negative Negative    Bilirubin, UA Negative Negative    Blood, UA Negative Negative    Protein, UA Negative Negative    Leuk Esterase, UA Negative Negative    Nitrite, UA Negative Negative    Urobilinogen, UA 0.2 E.U./dL 0.2 - 1.0 E.U./dL   CBC Auto Differential   Result Value Ref Range    WBC 4.51 3.40 - 10.80 10*3/mm3    RBC 4.54 4.14 - 5.80 10*6/mm3    Hemoglobin 13.4 13.0 - 17.7 g/dL    Hematocrit 41.9 37.5 - 51.0 %    MCV 92.3 79.0 - 97.0 fL    MCH 29.5 26.6 - 33.0 pg    MCHC 32.0 31.5 - 35.7 g/dL    RDW 18.3 (H) 12.3 - 15.4 %    RDW-SD 59.7 (H) 37.0 - 54.0 fl    MPV 8.6 6.0 - 12.0 fL    Platelets 114 (L) 140 - 450 10*3/mm3    Neutrophil % 42.3 (L) 42.7 - 76.0 %    Lymphocyte % 35.7 19.6 - 45.3 %    Monocyte % 14.4 (H) 5.0 - 12.0 %    Eosinophil % 6.2 0.3 - 6.2 %    Basophil % 0.7 0.0 - 1.5 %    Immature Grans % 0.7 (H) 0.0 - 0.5 %    Neutrophils, Absolute 1.91 1.70 - 7.00 10*3/mm3    Lymphocytes, Absolute 1.61 0.70 - 3.10 10*3/mm3    Monocytes, Absolute 0.65 0.10 - 0.90 10*3/mm3    Eosinophils, Absolute 0.28 0.00 - 0.40 10*3/mm3    Basophils, Absolute 0.03 0.00 - 0.20 10*3/mm3    Immature Grans, Absolute 0.03 0.00 - 0.05 10*3/mm3    nRBC 0.0 0.0 - 0.2 /100 WBC       IMAGING: Scans after 10 cycles of therapy on 5/28/2020 show an ongoing good response to therapy with a decrease in the liver metastases.      ASSESSMENT:  This is a 50 y.o. male with:  1.  Metastatic colon cancer: Extensive metastatic disease in the liver at diagnosis.  No other metastatic disease.  There is a 2.7 cm hypermetabolic lesion in the sigmoid colon on his PET imaging which is likely the primary tumor.  He did have a colonoscopy at the [facility] recently and we are working to get these results.  Mismatch repair intact.  K-lynnette mutation positive so he is not a candidate for Erbitux/vectibix.  BRAF mutation negative.   NRAS mutation negative.  HER-2 and MET negative by FISH.  TRK negative.  He was referred to Dr. Painting to discuss surgical options.  We have elected to proceed with chemotherapy followed by repeat imaging and then he will be reassessed to see if he is a surgical candidate or if there are any other local therapy options with respect to the liver.  We initially elected for palliative therapy with FOLFIRI.      Genetic testing with two VUS (variants of uncertain significance), one in MSH3 and one in NBN.     CT with progression after four cycle of FOLFIRI.  CEA also increased significantly.    Neutropenic on 12/30/2019.    Cycle 1 FOLFOX + frank on 1/6/2020.      Scans after 5 cycles of therapy on 3/9/2020 show a good response to therapy with a decrease in the liver metastases.    Neutropenia developed prior to cycle #8.  Treatment was held for 1 week.  Blood counts recovered.  We added G- mcg for 3 days following each treatment.  Home administration.  Improved.    CT imaging after 10 cycles on 5/28/2020 with ongoing improvement.    The patient is due for cycle #13. In preparation for upcoming surgery per Dr. Painting, Avastin will be on hold.  He did undergo a repeat CT scan this past weekend and is awaiting the results to make a determination regarding the surgery.  He has noted some progression of neuropathy and for this reason we will discontinue further Oxaliplatin.  Additionally, he has been more mentally down recently and I will contact Chinyere Ramirez to reach out to the patient.   The patient will return in 2 weeks for MD reevaluation with Dr. Georges.     2.  Cancer related pain: He was having worsening pain on oxycodone and therefore this was switched to immediate release morphine.  His pain has been reasonably well controlled and he is only required a few doses of the MSIR.  Pain remains well controlled today.     3.  Chemotherapy-induced neutropenia.  Blood counts have recovered.  G- mcg daily  for 3 days following chemotherapy added. He continues administering these at home without difficulty. Counts have now been more stable. ANC today 2.81. Total WBC today 5.66.    4.  Tachycardia: Heart rate remains regular today.  States he is drinking more water.    5.  Depression and insomnia: We have increased Zoloft to 100 mg.  He is also utilizing temazepam nightly.  Please see above for additional details.  He has been more depressed and therefore I will reach out to oncology support team for further assistance.    6.  Cold sensitivity related to oxaliplatin:.  He does experience cold sensitivity for several days following treatment and has recently developed some mild numbness of his fingertips that is more pronounced in his bilateral feet.  This is now beginning to interfere with his daily activities.  For this reason, we will omit subsequent Oxaliplatin from his care plan.  We will also start the patient on low-dose gabapentin at 100 mg nightly for symptom management    7.  Urine protein..  Blood pressure is acceptable at 121/85 today.  The patient did not have a urinalysis performed today as the Avastin is on hold    8.  Thrombocytopenia secondary to chemotherapy.  Platelet count today is 136,000 in the absence of excess bleeding and bruising.  Continue to monitor.    9.  Neutropenia related to chemotherapy:  GCSF x x 3 days after chemotherapy.  He administers this at home.    10.  Hyponatremia.  I have encouraged the patient to increase his salty food intake over the next week.  He is asymptomatic of this.    11.  Hypokalemia.  His potassium is very mildly low at 3.4.  I provided him with a list of potassium rich foods.`    PLAN:  1. Proceed with cycle #13  of therapy. We will omit Avastin in preparation for surgery.  Given progressive neurotoxicity, we will discontinue oxaliplatin from his subsequent cycles.  We will proceed with leucovorin and 5-FU only today  2. Continue G- mcg x 3 days following  each treatment  3. Gabapentin 100 mg nightly sent to the patient's pharmacy  4. List of potassium rich foods provided to the patient  5. The patient was encouraged to increase his sodium intake minimally over the next week  6. Return every 2 weeks for therapy . MD visit in 2  weeks with Dr. Georges and treatment   7. I have asked the patient to call with any new issues or concerns prior to his next office visit. He has v/u    -Patient is on drug therapy requiring extensive monitoring.  All the above reviewed with Dr. Georges and he is in agreement    LLOYD Carmona

## 2020-07-01 ENCOUNTER — INFUSION (OUTPATIENT)
Dept: ONCOLOGY | Facility: HOSPITAL | Age: 50
End: 2020-07-01

## 2020-07-01 DIAGNOSIS — Z45.2 ENCOUNTER FOR FITTING AND ADJUSTMENT OF VASCULAR CATHETER: ICD-10-CM

## 2020-07-01 DIAGNOSIS — C18.7 PRIMARY MALIGNANT NEOPLASM OF SIGMOID COLON (HCC): Primary | ICD-10-CM

## 2020-07-01 PROCEDURE — 25010000003 HEPARIN LOCK FLUSH PER 10 UNITS: Performed by: INTERNAL MEDICINE

## 2020-07-01 RX ORDER — SODIUM CHLORIDE 0.9 % (FLUSH) 0.9 %
10 SYRINGE (ML) INJECTION AS NEEDED
OUTPATIENT
Start: 2020-07-01

## 2020-07-01 RX ORDER — SODIUM CHLORIDE 0.9 % (FLUSH) 0.9 %
10 SYRINGE (ML) INJECTION AS NEEDED
Status: DISCONTINUED | OUTPATIENT
Start: 2020-07-01 | End: 2020-07-01 | Stop reason: HOSPADM

## 2020-07-01 RX ORDER — HEPARIN SODIUM (PORCINE) LOCK FLUSH IV SOLN 100 UNIT/ML 100 UNIT/ML
500 SOLUTION INTRAVENOUS AS NEEDED
Status: DISCONTINUED | OUTPATIENT
Start: 2020-07-01 | End: 2020-07-01 | Stop reason: HOSPADM

## 2020-07-01 RX ORDER — HEPARIN SODIUM (PORCINE) LOCK FLUSH IV SOLN 100 UNIT/ML 100 UNIT/ML
500 SOLUTION INTRAVENOUS AS NEEDED
OUTPATIENT
Start: 2020-07-01

## 2020-07-01 RX ADMIN — Medication 500 UNITS: at 09:37

## 2020-07-01 RX ADMIN — SODIUM CHLORIDE, PRESERVATIVE FREE 10 ML: 5 INJECTION INTRAVENOUS at 09:36

## 2020-07-02 ENCOUNTER — DOCUMENTATION (OUTPATIENT)
Dept: PHARMACY | Facility: HOSPITAL | Age: 50
End: 2020-07-02

## 2020-07-02 ENCOUNTER — TELEPHONE (OUTPATIENT)
Dept: ONCOLOGY | Facility: CLINIC | Age: 50
End: 2020-07-02

## 2020-07-02 ENCOUNTER — TELEPHONE (OUTPATIENT)
Dept: ONCOLOGY | Facility: HOSPITAL | Age: 50
End: 2020-07-02

## 2020-07-02 NOTE — TELEPHONE ENCOUNTER
Per Rosalva, pt's jason was delivered and is in pharmacy. She will alert pharmacy that pt is coming to get it now. Called pt and informed him he can come  injections now. He v/u.

## 2020-07-02 NOTE — TELEPHONE ENCOUNTER
PT IS CALLING TO SEE IF HIS ZARXIO HAS BEEN DELIVERED TO THE OFFICE ? PT STATED HE NORMALLY PICKS THIS UP FROM THE OFFICE     PT CONTACT # [phone]

## 2020-07-02 NOTE — TELEPHONE ENCOUNTER
HUB message from pt forwarded to me by Paola RN-pt asking if his Zarxio has been delivered. This was delivered yesterday after pt had left the office. I replied to Paola letting her know the above.    I spoke with Aracelis LAGUERRE RN-Paola left for the day and she was following up. I informed Aracelis of the same and pt will come today to pick this up (I informed her he will need to get it before 5 pm).    Ashlee in pharmacy made aware that pt is coming.

## 2020-07-13 ENCOUNTER — APPOINTMENT (OUTPATIENT)
Dept: ONCOLOGY | Facility: HOSPITAL | Age: 50
End: 2020-07-13

## 2020-08-24 ENCOUNTER — OFFICE VISIT (OUTPATIENT)
Dept: PSYCHIATRY | Facility: HOSPITAL | Age: 50
End: 2020-08-24

## 2020-08-24 DIAGNOSIS — F33.1 MODERATE EPISODE OF RECURRENT MAJOR DEPRESSIVE DISORDER (HCC): Primary | ICD-10-CM

## 2020-08-24 DIAGNOSIS — F41.1 GENERALIZED ANXIETY DISORDER: ICD-10-CM

## 2020-08-24 PROCEDURE — 99213 OFFICE O/P EST LOW 20 MIN: CPT | Performed by: NURSE PRACTITIONER

## 2020-08-24 RX ORDER — OLANZAPINE 2.5 MG/1
2.5 TABLET ORAL NIGHTLY
Qty: 30 TABLET | Refills: 1 | Status: SHIPPED | OUTPATIENT
Start: 2020-08-24

## 2020-08-24 RX ORDER — GABAPENTIN 300 MG/1
300 CAPSULE ORAL 3 TIMES DAILY
Qty: 120 CAPSULE | Refills: 1 | Status: SHIPPED | OUTPATIENT
Start: 2020-08-24 | End: 2021-08-24

## 2020-08-24 NOTE — PROGRESS NOTES
Supportive Oncology Services  Phone visit- pandemic. Video attempted. Pt unable to conduct session via video at this time.    Subjective  Patient ID: Silver Sloan is a 50 y.o. male has been called today from the SOS clinic in lieu of in person visit.    Pt noted to be alert, oriented, and engaged in conversation. Affect and mood dysthymic, exhausted  Pt describes feeling depressed; denies suicidality. Acknowledges feeling mixed up in his head.  Reports challenges managing surgery and recovery.  Finds it has been difficult to bounce back. Appetite describes as being reduced with appx 16 lbs weight loss. Denies nausea; BMs somewhat inconsistent.   Continues with soreness, although pain is decently well managed.  Sleep is described as being fragmented, with challenges falling back to sleep. Is sleeping on and off during the day.  Energy level low.    Medications Reviewed:  Gabapentin 600 bid    Diagnoses and all orders for this visit:    Moderate episode of recurrent major depressive disorder (CMS/HCC)    Generalized anxiety disorder  -     gabapentin (Neurontin) 300 MG capsule; Take 1 capsule by mouth 3 (Three) Times a Day.    Other orders  -     OLANZapine (zyPREXA) 2.5 MG tablet; Take 1 tablet by mouth Every Night.    Plan of Care  Patient continues with challenges managing medical complexity and intrusive symptoms of depression.  Explored current dosing of Zoloft with potential for further GI disruption with increase; instead, will augment with low-dose olanzapine to mitigate symptoms of sleeplessness, anxiety, and mood disruption.  Sleep hygiene techniques discussed, namely staying awake during the day and activated with limitations in the last naps.  Discussed behavioral activation techniques, and patient quality of life goals.  Short-term follow-up scheduled in 2 weeks to assess    Total time spent on phone with patient: 15 minutes.   11 minutes spent in supportive counseling surrounding issues of  demoralization, exploration of patient quality of life goals, discussion surrounding intrusiveness of depression expectations and medication adjustments, and benefits of short-term follow-up.

## 2020-08-25 ENCOUNTER — TELEPHONE (OUTPATIENT)
Dept: PSYCHIATRY | Facility: HOSPITAL | Age: 50
End: 2020-08-25

## 2020-08-25 NOTE — TELEPHONE ENCOUNTER
Supportive Oncology Services    Call placed to patient regarding communication with Dr. Georges.  Dr. Georges states he and Dr. Painting are in contact regarding patient case, and Dr. Painting will let Dr. Georges know when a follow-up visit is needed in CBC office.  I have left patient message with this information and encouraged him to call the office or send them a message with any questions, concerns, or need for additional follow-up.

## 2020-08-27 ENCOUNTER — TELEPHONE (OUTPATIENT)
Dept: ONCOLOGY | Facility: CLINIC | Age: 50
End: 2020-08-27

## 2020-08-27 NOTE — TELEPHONE ENCOUNTER
I spoke with him on the phone.  He is doing okay.  Getting stronger from surgery.  Pain adequately controlled.  Incision healing nicely.  I offered encouragement.  I assured him that Dr. Painting is keeping me informed of his progress.  I will see him back in the office when appropriate.

## 2020-09-08 ENCOUNTER — OFFICE VISIT (OUTPATIENT)
Dept: PSYCHIATRY | Facility: HOSPITAL | Age: 50
End: 2020-09-08

## 2020-09-08 DIAGNOSIS — F41.1 GENERALIZED ANXIETY DISORDER: Primary | ICD-10-CM

## 2020-09-08 DIAGNOSIS — F33.1 MODERATE EPISODE OF RECURRENT MAJOR DEPRESSIVE DISORDER (HCC): ICD-10-CM

## 2020-09-08 PROCEDURE — 99213 OFFICE O/P EST LOW 20 MIN: CPT | Performed by: NURSE PRACTITIONER

## 2020-09-08 NOTE — PROGRESS NOTES
Supportive Oncology Services  Phone visit- pandemic. Video attempted. Unable to conduct due to poor quality which negatively impacted communication during session. Poor audio quality.    Subjective  Patient ID: Silver Sloan is a 50 y.o. male has been called today from the SOS clinic in lieu of in person visit.    Pt noted to be alert, oriented, and engaged in conversation. Affect and mood euthymic.  Pt describes to be feeling much better than last visit.   Physically, pt describes feeling somewhat better. Continues with some minor pain related to surgery, with persistent weight loss.  Finds this contributes to sense of anticipatory anxiety regarding upcoming scans.  Was able to travel with brother to see sisters, which was greatly beneficial to spirit.   Pt has initiated olanzapine, which has greatly assisted sleep with improvement in appetite.  Unfortunately, weight loss persists.  Mourns change in muscle mass.BMs remain different although somewhat improved; denies sx of dumping syndrome. Pt has never met with Yolande Aviles, oncology dietitian, but expresses interest.  Pt acknowledges debilitation. Continues to walk, although cannot lift weights or jog.     Medications Reviewed:  Gabapentin 300 mg q am. 600 mg q hs  Zyprexa 2.5 mg q hs  Zoloft 100 mg daily    Diagnoses and all orders for this visit:    Generalized anxiety disorder    Moderate episode of recurrent major depressive disorder (CMS/[facility])    Plan of Care  Patient with significant benefit to newly added Zyprexa in terms of sleep and appetite management.  Will continue as written.  Reviewed improved energy with reduced dosing of a.m. gabapentin.  Will continue 300 mg every morning and 600 mg nightly.  Additionally will continue Zoloft 100 mg daily.  Explored with patient continued weight loss; will connect to Yolande Man registered dietitian.  Patient to continue discussions with Dr. Painting regarding this.  Follow-up scheduled in clinic via  telehealth in approximately 3 weeks.    Total time spent on phone with patient: 22 minutes.   17 minutes spent in supportive counseling surrounding issues of sleep hygiene, weight management, and benefits of physical activity and behavioral activation, finding normalcy amidst pandemic and cancer treatment, and continued support available in clinic.

## 2020-09-17 ENCOUNTER — TELEPHONE (OUTPATIENT)
Dept: ONCOLOGY | Facility: CLINIC | Age: 50
End: 2020-09-17

## 2020-09-17 DIAGNOSIS — C18.7 PRIMARY MALIGNANT NEOPLASM OF SIGMOID COLON (HCC): Primary | ICD-10-CM

## 2020-09-17 NOTE — TELEPHONE ENCOUNTER
Note from Dr. Painting that he has new liver metastases.  I will see him back next week for follow-up to discuss further systemic treatment options.  We will need to make sure we have all of his scans and pathology data.

## 2020-09-17 NOTE — TELEPHONE ENCOUNTER
INFORMED PT. THE APPT DESK SHOULD BE CALLING HIM SOON.  HE STATES IT HAS ALREADY BE TAKEN CARE OF.  NO FURTHER QUESTIONS OR CONCERNS AT THIS TIME.

## 2020-09-17 NOTE — TELEPHONE ENCOUNTER
----- Message from Marty Georges MD sent at 2020 11:22 AM EDT -----  Regardin units with me next week  Carmela,He has a progression of his cancer per Dr. Painting.  He needs to see me next week, 2 units.  We will need to discuss treatment options at that time.  No chemo to be scheduled that day.  Please schedule a port flush, labs including CBC, CMP, CEA.    Thanks, NIKITA

## 2020-09-17 NOTE — TELEPHONE ENCOUNTER
Caller: Silver     Relationship to patient: Self    Best call back number: [phone]    Type of visit: Follow up, infusion    Requested date: Asap    Additional notes: Dr. Painting surgical oncologist suggested pt needs to get back on chemo, tumor has expanded

## 2020-09-17 NOTE — TELEPHONE ENCOUNTER
PT HAS BEEN SCHEDULED FOR 9/22/20 - PT HAS BEEN CALLED WITH HIS APPT DATE, TIME AND LOCATION AS PT WANTED THE [facility] OFFICE - PT IS AWARE OF THE PORT FLUSH/LABS AND APPT WITH DR CHARLES .

## 2020-09-22 ENCOUNTER — OFFICE VISIT (OUTPATIENT)
Dept: ONCOLOGY | Facility: CLINIC | Age: 50
End: 2020-09-22

## 2020-09-22 ENCOUNTER — INFUSION (OUTPATIENT)
Dept: ONCOLOGY | Facility: HOSPITAL | Age: 50
End: 2020-09-22

## 2020-09-22 VITALS
HEIGHT: 72 IN | WEIGHT: 216.4 LBS | SYSTOLIC BLOOD PRESSURE: 136 MMHG | HEART RATE: 98 BPM | TEMPERATURE: 97.8 F | DIASTOLIC BLOOD PRESSURE: 84 MMHG | RESPIRATION RATE: 16 BRPM | BODY MASS INDEX: 29.31 KG/M2 | OXYGEN SATURATION: 96 %

## 2020-09-22 DIAGNOSIS — C78.7 LIVER METASTASES: ICD-10-CM

## 2020-09-22 DIAGNOSIS — C18.7 PRIMARY MALIGNANT NEOPLASM OF SIGMOID COLON (HCC): ICD-10-CM

## 2020-09-22 DIAGNOSIS — C18.7 PRIMARY MALIGNANT NEOPLASM OF SIGMOID COLON (HCC): Primary | ICD-10-CM

## 2020-09-22 LAB
ALBUMIN SERPL-MCNC: 3.6 G/DL (ref 3.5–5.2)
ALBUMIN/GLOB SERPL: 0.9 G/DL (ref 1.1–2.4)
ALP SERPL-CCNC: 257 U/L (ref 38–116)
ALT SERPL W P-5'-P-CCNC: 24 U/L (ref 0–41)
ANION GAP SERPL CALCULATED.3IONS-SCNC: 10.3 MMOL/L (ref 5–15)
AST SERPL-CCNC: 35 U/L (ref 0–40)
BASOPHILS # BLD AUTO: 0.05 10*3/MM3 (ref 0–0.2)
BASOPHILS NFR BLD AUTO: 0.5 % (ref 0–1.5)
BILIRUB SERPL-MCNC: 0.6 MG/DL (ref 0.2–1.2)
BUN SERPL-MCNC: 12 MG/DL (ref 6–20)
BUN/CREAT SERPL: 17.4 (ref 7.3–30)
CALCIUM SPEC-SCNC: 8.9 MG/DL (ref 8.5–10.2)
CEA SERPL-MCNC: 1348 NG/ML
CHLORIDE SERPL-SCNC: 101 MMOL/L (ref 98–107)
CO2 SERPL-SCNC: 24.7 MMOL/L (ref 22–29)
CREAT SERPL-MCNC: 0.69 MG/DL (ref 0.7–1.3)
DEPRECATED RDW RBC AUTO: 53.3 FL (ref 37–54)
EOSINOPHIL # BLD AUTO: 0.55 10*3/MM3 (ref 0–0.4)
EOSINOPHIL NFR BLD AUTO: 6 % (ref 0.3–6.2)
ERYTHROCYTE [DISTWIDTH] IN BLOOD BY AUTOMATED COUNT: 18.6 % (ref 12.3–15.4)
GFR SERPL CREATININE-BSD FRML MDRD: 121 ML/MIN/1.73
GFR SERPL CREATININE-BSD FRML MDRD: 147 ML/MIN/1.73
GLOBULIN UR ELPH-MCNC: 4.1 GM/DL (ref 1.8–3.5)
GLUCOSE SERPL-MCNC: 120 MG/DL (ref 74–124)
HCT VFR BLD AUTO: 41.3 % (ref 37.5–51)
HGB BLD-MCNC: 12.4 G/DL (ref 13–17.7)
IMM GRANULOCYTES # BLD AUTO: 0.03 10*3/MM3 (ref 0–0.05)
IMM GRANULOCYTES NFR BLD AUTO: 0.3 % (ref 0–0.5)
LYMPHOCYTES # BLD AUTO: 2.1 10*3/MM3 (ref 0.7–3.1)
LYMPHOCYTES NFR BLD AUTO: 22.9 % (ref 19.6–45.3)
MCH RBC QN AUTO: 24.1 PG (ref 26.6–33)
MCHC RBC AUTO-ENTMCNC: 30 G/DL (ref 31.5–35.7)
MCV RBC AUTO: 80.4 FL (ref 79–97)
MONOCYTES # BLD AUTO: 0.91 10*3/MM3 (ref 0.1–0.9)
MONOCYTES NFR BLD AUTO: 9.9 % (ref 5–12)
NEUTROPHILS NFR BLD AUTO: 5.55 10*3/MM3 (ref 1.7–7)
NEUTROPHILS NFR BLD AUTO: 60.4 % (ref 42.7–76)
NRBC BLD AUTO-RTO: 0 /100 WBC (ref 0–0.2)
PLATELET # BLD AUTO: 268 10*3/MM3 (ref 140–450)
PMV BLD AUTO: 9.7 FL (ref 6–12)
POTASSIUM SERPL-SCNC: 3.7 MMOL/L (ref 3.5–4.7)
PROT SERPL-MCNC: 7.7 G/DL (ref 6.3–8)
RBC # BLD AUTO: 5.14 10*6/MM3 (ref 4.14–5.8)
SODIUM SERPL-SCNC: 136 MMOL/L (ref 134–145)
WBC # BLD AUTO: 9.19 10*3/MM3 (ref 3.4–10.8)

## 2020-09-22 PROCEDURE — 80053 COMPREHEN METABOLIC PANEL: CPT

## 2020-09-22 PROCEDURE — 36591 DRAW BLOOD OFF VENOUS DEVICE: CPT

## 2020-09-22 PROCEDURE — 99215 OFFICE O/P EST HI 40 MIN: CPT | Performed by: INTERNAL MEDICINE

## 2020-09-22 PROCEDURE — 96523 IRRIG DRUG DELIVERY DEVICE: CPT

## 2020-09-22 PROCEDURE — 82378 CARCINOEMBRYONIC ANTIGEN: CPT | Performed by: INTERNAL MEDICINE

## 2020-09-22 PROCEDURE — 85025 COMPLETE CBC W/AUTO DIFF WBC: CPT

## 2020-09-22 RX ORDER — ASPIRIN 81 MG
1 TABLET, DELAYED RELEASE (ENTERIC COATED) ORAL DAILY
COMMUNITY
End: 2020-09-22

## 2020-09-22 RX ORDER — TRAMADOL HYDROCHLORIDE 50 MG/1
50 TABLET ORAL EVERY 6 HOURS PRN
Qty: 60 TABLET | Refills: 0 | Status: SHIPPED | OUTPATIENT
Start: 2020-09-22

## 2020-09-22 RX ORDER — PANTOPRAZOLE SODIUM 40 MG/1
40 TABLET, DELAYED RELEASE ORAL DAILY
COMMUNITY
Start: 2020-08-08 | End: 2020-09-22

## 2020-09-22 RX ORDER — CEPHALEXIN 500 MG/1
CAPSULE ORAL
COMMUNITY
Start: 2020-08-10 | End: 2020-09-22

## 2020-09-22 RX ORDER — TRAMADOL HYDROCHLORIDE 50 MG/1
50 TABLET ORAL
COMMUNITY
Start: 2020-08-07 | End: 2020-09-22 | Stop reason: SDDI

## 2020-09-22 NOTE — PROGRESS NOTES
[facility] GROUP OUTPATIENT FOLLOW UP CLINIC VISIT    REASON FOR FOLLOW-UP:    1.  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  Palliative therapy with FOLFIRI initiated on 11/4/2019.  3. Evidence of disease progression following 4 cycles of FOLFIRI.  4.  FOLFOX+bevacizumab initiated 1/6/2020  5.  CT 3/9/2020 with improvement  6.  CT 5/28/2020 with further improvement  7. Final chemotherapy 6/29/2020, oxaliplatin omitted due to neurotoxicity  8. Open segment 7/8 and small wedge resection segment 4 by Dr. Painting on 8/3/2020 (first part of planned staged resection).  9. Post-op mild wound infection  10. CT imaging 9/17/2020 with progression in the liver. Fluid collection in the liver representing seroma or biloma. Progression in the left hepatic lobe, measuring 12.7 cm by 6.2 cm. New hepatic lobe lesions in the left hepatic lobe segment 2 measuring 2.2 cm and several right hepatic lobe lesions, one measuring 2.8 cm. New metastatic disease adjacent to the operative site.       HISTORY OF PRESENT ILLNESS:  Silver Sloan is a 50 y.o. male who returns today for follow up of the above issue    He had a partial liver resection by Dr. Painting on 8/3/2020 with further planned resection of the other liver metastases and the sigmoid colon at a later date. Unfortunately, scans on 9/17 show progression of metastatic disease in the liver.    He is having some right upper quadrant abdominal pain.  He is using acetaminophen for this only at this point having run out of his other pain medication.  He states tramadol worked in the past.  Neuropathy is stable.  He continues gabapentin 300 mg during the day and 600 mg at night.  He had a small wound infection which is healed.  He reports some depression regarding the progression of his disease.  He is interested in seeking other opinions.  He will go to [place] tomorrow.  He also inquires about regional referral centers in the possibility of  clinical trials.      ONCOLOGIC HISTORY:  He lives in [facility].  He presented to [facility] for further care.  CT imaging indicated liver lesions.  He had a CT-guided liver biopsy performed on 10/14/2019 with pathology consistent with adenocarcinoma metastatic from colon.  He had a PET scan performed on 10/18/2019 which revealed a 2.7 cm mass in the sigmoid colon thought to be the primary malignancy.  The liver is enlarged measuring 22 cm.  There is a 14.3 cm mass in the right lobe of the liver with an SUV of 6.52 and an 11 cm mass in the left lobe of the liver with an SUV of 6.38.  There is a 1.5 cm lesion in the inferior right lobe of the liver with an SUV of 4.39.  There was no other evidence for metastatic disease.  There is no lymphadenopathy in the neck chest abdomen pelvis or retroperitoneum.  Multinodular goiter with thyromegaly was visualized.  No bowel obstruction was visualized.  An umbilical hernia and a left inguinal hernia were visualized.  There is some degenerative disc disease in the lumbar spine.     He saw an oncologist in [place].  He has family that lives here in [place] and he believes that he wants to be treated here in [place].  A Mediport is scheduled in [place] on Friday.     His right upper quadrant abdominal pain is adequately controlled with oxycodone 5 mg tablets 1-2 every 6 hours as needed.  He does continue to have some sharp right lower chest pain with deep breaths.  He has nausea that is well controlled with Zofran and Phenergan.  He is having bowel movements with a bowel regimen.  He has lost about 25 pounds over the past month or so.    He was initially seen in consultation on 10/22/2019.  He wanted to see a surgical oncologist and was referred to Dr. Painting who agreed with initiating therapy with chemotherapy first with surgical resection possible at a later date.  He was referred for colonoscopy and this was done at the  [facility].    He initiated palliative FOLFIRI on 11/4/2019.    Cycle #2 initiated on 11/18/2019.  Mild reaction.  We continue 50 mg of Benadryl with each cycle.    CT imaging on 12/23/2019 with progression.    Neutropenic on 12/30/2019 requiring a delay in therapy.      CEA 4399 on 10/22/2019 --> 6735 on 1/6/2020    FOLFOX+frank initiated 1/6/2020    Scans after 5 cycles of therapy on 3/9/2020 show a good response to therapy with a decrease in the liver metastases.    Cycle #6 initiated 3/16/2020.    Scans on 5/28/2020 with improvement.    Final chemotherapy 6/29/2020, oxaliplatin omitted due to neurotoxicity    Open segment 7/8 and small wedge resection segment 4 by Dr. Painting on 8/3/2020 (first part of planned staged resection).    Post-op mild wound infection    CT imaging 9/17/2020 with progression in the liver. Fluid collection in the liver representing seroma or biloma. Progression in the left hepatic lobe, measuring 12.7 cm by 6.2 cm. New hepatic lobe lesions in the left hepatic lobe segment 2 measuring 2.2 cm and several right hepatic lobe lesions, one measuring 2.8 cm. New metastatic disease adjacent to the operative site.         ALLERGIES:  No Known Allergies    MEDICATIONS:  The medication list has been reviewed with the patient by the medical assistant, and the list has been updated in the electronic medical record, which I reviewed.  Medication dosages and frequencies were confirmed to be accurate.    I have reviewed the patient's medical history in detail and updated the computerized patient record.    Review of Systems   Constitutional: Positive for fatigue. Negative for appetite change, chills and fever.   HENT:   Negative for trouble swallowing.    Respiratory: Negative for cough and shortness of breath.    Cardiovascular: Negative for chest pain and leg swelling.   Gastrointestinal: Positive for abdominal pain. Negative for abdominal distention, blood in stool, constipation,  "diarrhea and nausea.        See HPI, reflux    Musculoskeletal: Negative for arthralgias and myalgias.   Skin: Negative for rash.   Neurological: Positive for numbness (Due to oxaliplatin, stable). Negative for dizziness and extremity weakness.   Hematological: Does not bruise/bleed easily.   Psychiatric/Behavioral: Positive for depression. Negative for sleep disturbance.   Reviewed 9/22/2020      Vitals:    09/22/20 1426   BP: 136/84   Pulse: 98   Resp: 16   Temp: 97.8 °F (36.6 °C)   TempSrc: Temporal   SpO2: 96%   Weight: 98.2 kg (216 lb 6.4 oz)   Height: 183 cm (72.05\")   PainSc: 3  Comment: neutrapenia   PainLoc: Comment: right side abdominal       PHYSICAL EXAMINATION:  GENERAL:  Appears tired male; awake, alert and oriented, in no acute distress.  Wearing a mask today.  SKIN:  Warm and dry, without rashes, purpura, or petechiae.  HEAD:  Normocephalic, atraumatic.  EARS:  Hearing intact.  CHEST:  Lungs are clear to auscultation bilaterally.  No wheezes, rales, or rhonchi.  HEART:  Regular rate; normal rhythm.  No murmurs, gallops or rubs.  ABDOMEN:  Right upper quadrant abdominal tenderness to palpation without rebound or guarding.  Normal active bowel sounds.  EXTREMITIES:  No clubbing cyanosis or edema.  SKIN: No rash  NEUROLOGICAL: Decreased sensation to light touch present on his hands       DIAGNOSTIC DATA:  Results for orders placed or performed in visit on 09/22/20   CBC Auto Differential    Specimen: Blood   Result Value Ref Range    WBC 9.19 3.40 - 10.80 10*3/mm3    RBC 5.14 4.14 - 5.80 10*6/mm3    Hemoglobin 12.4 (L) 13.0 - 17.7 g/dL    Hematocrit 41.3 37.5 - 51.0 %    MCV 80.4 79.0 - 97.0 fL    MCH 24.1 (L) 26.6 - 33.0 pg    MCHC 30.0 (L) 31.5 - 35.7 g/dL    RDW 18.6 (H) 12.3 - 15.4 %    RDW-SD 53.3 37.0 - 54.0 fl    MPV 9.7 6.0 - 12.0 fL    Platelets 268 140 - 450 10*3/mm3    Neutrophil % 60.4 42.7 - 76.0 %    Lymphocyte % 22.9 19.6 - 45.3 %    Monocyte % 9.9 5.0 - 12.0 %    Eosinophil % 6.0 0.3 - " 6.2 %    Basophil % 0.5 0.0 - 1.5 %    Immature Grans % 0.3 0.0 - 0.5 %    Neutrophils, Absolute 5.55 1.70 - 7.00 10*3/mm3    Lymphocytes, Absolute 2.10 0.70 - 3.10 10*3/mm3    Monocytes, Absolute 0.91 (H) 0.10 - 0.90 10*3/mm3    Eosinophils, Absolute 0.55 (H) 0.00 - 0.40 10*3/mm3    Basophils, Absolute 0.05 0.00 - 0.20 10*3/mm3    Immature Grans, Absolute 0.03 0.00 - 0.05 10*3/mm3    nRBC 0.0 0.0 - 0.2 /100 WBC       IMAGING: No images available for review    ASSESSMENT:  This is a 50 y.o. male with:  1.  Metastatic colon cancer:   · Extensive metastatic disease in the liver at diagnosis.  No other metastatic disease.  There is a 2.7 cm hypermetabolic lesion in the sigmoid colon on his PET imaging which is likely the primary tumor.  He did have a colonoscopy at the [facility] recently and we are working to get these results.  Mismatch repair intact.  K-lynnette mutation positive so he is not a candidate for Erbitux/vectibix.  BRAF mutation negative.  NRAS mutation negative.  HER-2 and MET negative by FISH.  TRK negative.  He was referred to Dr. Painting to discuss surgical options.  We have elected to proceed with chemotherapy followed by repeat imaging and then he will be reassessed to see if he is a surgical candidate or if there are any other local therapy options with respect to the liver.   · We initially elected for palliative therapy with FOLFIRI, initiated 11/4/2019.    · Genetic testing with two VUS (variants of uncertain significance), one in MSH3 and one in NBN.   · CT with progression after four cycle of FOLFIRI.  CEA also increased significantly.    · Cycle 1 FOLFOX + frank on 1/6/2020.    · Scans after 5 cycles of therapy on 3/9/2020 show a good response to therapy with a decrease in the liver metastases.  · Neutropenia developed prior to cycle #8.  Treatment was held for 1 week.  Blood counts recovered.  We added G- mcg for 3 days following each treatment.  Home administration.   Improved.  · CT imaging after 10 cycles on 5/28/2020 with ongoing improvement.  · Evaluated by Dr. Painting  · Final chemotherapy 6/29/2020, oxaliplatin omitted due to neurotoxicity, no frank due to planned surgery  · Open segment 7/8 and small wedge resection segment 4 by Dr. Painting on 8/3/2020 (first part of planned staged resection).  · Post-op mild wound infection  · CT imaging 9/17/2020 with progression in the liver. Fluid collection in the liver representing seroma or biloma. Progression in the left hepatic lobe, measuring 12.7 cm by 6.2 cm. New hepatic lobe lesions in the left hepatic lobe segment 2 measuring 2.2 cm and several right hepatic lobe lesions, one measuring 2.8 cm. New metastatic disease adjacent to the operative site.     2.  Cancer related pain: Now postoperative pain and pain related to progressive disease.  He has been using acetaminophen.  He states tramadol worked in the past.  Prescription for tramadol electronically sent to his pharmacy today.    3  Depression and insomnia: We have increased Zoloft to 100 mg.  He is also utilizing temazepam nightly.  Please see above for additional details.  Our supportive oncology service has been helping as well.    4.  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    5.  Mediport that requires maintenance: His port was flushed today.    PLAN:  1. I have requested [facility] CDx testing on his liver specimen from [facility]. This data is pending and we anticipate results around 10/5/2020.  2. Guardant 360 testing on the blood was ordered today  3. We discussed standard treatment options including FOLFIRI plus bevacizumab or plus zib-aflibercept today.  He did have previous progression on FOLFIRI alone so it is unclear if these would be good options.  Otherwise we have [facility] for standard therapy.  With microsatellite stability, immunotherapy is not an option.     4. We do not have tumor mutation burden available but this has been requested along with other molecular testing.   5. He will go to [place] for an opinion tomorrow  6. He also is interested in clinical trial options both regionally and nationally such as [facility]  7. I will reach out to a colleague at [place] for an opinion and to see if there are clinical trial options there.  8. I will follow-up with him after I have molecular data  9. Prescription for tramadol electronically sent to his pharmacy today.    40 minutes face-to-face today.  25 minutes spent counseling him regarding the above issues.  His brother was also present on the phone today as well and had many good questions which I answered to the best of my ability.  Some questions or regarding local therapy to the liver which I will defer ultimately to Dr. Painting.        Marty Georges MD

## 2020-09-24 ENCOUNTER — TELEPHONE (OUTPATIENT)
Dept: ONCOLOGY | Facility: CLINIC | Age: 50
End: 2020-09-24

## 2020-09-25 ENCOUNTER — OFFICE VISIT (OUTPATIENT)
Dept: PSYCHIATRY | Facility: HOSPITAL | Age: 50
End: 2020-09-25

## 2020-09-25 ENCOUNTER — TELEPHONE (OUTPATIENT)
Dept: GENERAL RADIOLOGY | Facility: HOSPITAL | Age: 50
End: 2020-09-25

## 2020-09-25 ENCOUNTER — TELEPHONE (OUTPATIENT)
Dept: ONCOLOGY | Facility: CLINIC | Age: 50
End: 2020-09-25

## 2020-09-25 ENCOUNTER — APPOINTMENT (OUTPATIENT)
Dept: ONCOLOGY | Facility: HOSPITAL | Age: 50
End: 2020-09-25

## 2020-09-25 DIAGNOSIS — F33.1 MODERATE EPISODE OF RECURRENT MAJOR DEPRESSIVE DISORDER (HCC): ICD-10-CM

## 2020-09-25 DIAGNOSIS — F41.1 GENERALIZED ANXIETY DISORDER: Primary | ICD-10-CM

## 2020-09-25 DIAGNOSIS — C18.7 PRIMARY MALIGNANT NEOPLASM OF SIGMOID COLON (HCC): Primary | ICD-10-CM

## 2020-09-25 PROCEDURE — 99213 OFFICE O/P EST LOW 20 MIN: CPT | Performed by: NURSE PRACTITIONER

## 2020-09-25 NOTE — TELEPHONE ENCOUNTER
----- Message from Marty Georges MD sent at 9/25/2020 12:36 PM EDT -----  Regarding: Referral to [facility]  He needs to be referred to Dr. Cohen at [facility] for metastatic colorectal cancer to be evaluated for clinical trials. Patient is aware. Dr. Cohen is aware. Thanks, NIKITA

## 2020-09-25 NOTE — TELEPHONE ENCOUNTER
I spoke with him on the phone today.  He is interested in going to [place] to look at clinical trials.  Referral to Dr. Cohen has been made.  We will follow-up once we have molecular data back.

## 2020-09-25 NOTE — PROGRESS NOTES
Supportive Oncology Services  Phone visit- pandemic. Video attempted. Pt unable to conduct session via video at this time. Pt consents to session via phone.    Subjective  Patient ID: Silver Sloan is a 50 y.o. male has been called today from the [facility] clinic in lieu of in person visit.    Pt noted to be alert, oriented, and engaged in conversation. Affect and mood euthymic.  Pt reviews recent scans with determination that tumor has increased significantly in size with new tumor development. Reports he is no longer a surgical candidate, and has begun to review possible clinical trials. Pt is considering connection to [facility], although unfortunately insurance is not in network. Hopefully awaits assistance from their financial team.  Pt describes to be doing emotionally well, despite this. Acknowledges attempt to control his thoughts and actions, recognizing his disease state is not in his control. Continues to appreciate closeness of family.  Is looking forward to going to [place] this weekend with family to surprise nephew at baseball game. Acknowledges adherence to a schedule, waking and showering daily. Pain has been somewhat increased, alleviated by tramadol. Sleep remains appropriate. Mood and anxiety surprisingly well managed.     Medications Reviewed:  Gabapentin 300 mg q day  Zyprexa 2.5 mg q hs  Zoloft 100 mg daily    Diagnoses and all orders for this visit:    Generalized anxiety disorder    Moderate episode of recurrent major depressive disorder (CMS/[facility])    Plan of Care  Patient with positively managed symptoms of mood and anxiety disruption as well as insomnia on current medication regimen of gabapentin, Zyprexa, and Zoloft.  Will continue as written.  Continued counseling provided regarding radical acceptance of current situation, exploration of wisam and pleasure in new normal, and continued management of current anxieties.  We will continue with short-term follow-up q. 2 weeks with next visit  scheduled via telehealth in 2 weeks.    Total time spent on phone with patient: 22 minutes.   17 minutes spent in supportive counseling surrounding issues of continued cope, radical acceptance, new normal development, and engagement in enjoyable activities with QOL focus.

## 2020-09-25 NOTE — TELEPHONE ENCOUNTER
Spoke w/ Monika @ [facility] who said patient insurance is out of network. But she could send message to [facility] office to see if pt could be seen if he pays for visit. Told DR Georges this who oked to wait and see what is said and explained to patient what is happening. Faxed records #[phone] phone # [phone]

## 2020-09-29 ENCOUNTER — TELEPHONE (OUTPATIENT)
Dept: ONCOLOGY | Facility: CLINIC | Age: 50
End: 2020-09-29

## 2020-09-30 ENCOUNTER — TELEPHONE (OUTPATIENT)
Dept: ONCOLOGY | Facility: CLINIC | Age: 50
End: 2020-09-30

## 2020-09-30 ENCOUNTER — TELEPHONE (OUTPATIENT)
Dept: ONCOLOGY | Facility: HOSPITAL | Age: 50
End: 2020-09-30

## 2020-09-30 LAB — REF LAB TEST METHOD: NORMAL

## 2020-09-30 NOTE — TELEPHONE ENCOUNTER
Brother notified, v/u.     ----- Message from Marty Georges MD sent at 9/30/2020  3:54 PM EDT -----  Seems reasonable to do something sooner rather than later. No guarantees for how he will be feeling even in a few months. [facility]  ----- Message -----  From: Maria Alejandra Silverio RN  Sent: 9/30/2020   3:06 PM EDT  To: Marty Georges MD    Let me know your thoughts on this please...      Caller: Sunny Sloan     Relationship to patient: [employer]     Best call back number: [phone]     Concerns or Questions if Applicable:  Pt's cancer is progressing really fast. The family is waiting for tumor markers to come back on the 10.5.20.     Sunny wants to take pt to do something with their family before his health declines. They're working out logistics of everyone's schedule.      He wants to clear this with Dr. Georges first and asked if he thinks it's ok to do this now or if the pt may have more time.

## 2020-09-30 NOTE — TELEPHONE ENCOUNTER
Caller: Sunny Sloan    Relationship to patient: Brother    Best call back number: [phone]    Concerns or Questions if Applicable:  Pt's cancer is progressing really fast. The family is waiting for tumor markers to come back on the 10.5.20.    Sunny wants to take pt to do something with their family before his health declines. They're working out logistics of everyone's schedule.     He wants to clear this with Dr. Georges first and asked if he thinks it's ok to do this now or if the pt may have more time.   yes...

## 2020-10-02 ENCOUNTER — TELEPHONE (OUTPATIENT)
Dept: ONCOLOGY | Facility: HOSPITAL | Age: 50
End: 2020-10-02

## 2020-10-02 ENCOUNTER — TELEPHONE (OUTPATIENT)
Dept: ONCOLOGY | Facility: CLINIC | Age: 50
End: 2020-10-02

## 2020-10-02 NOTE — TELEPHONE ENCOUNTER
Pt called for follow up with discussion between Eden and Monika at [facility]. Referred pt to Monika @ [phone] according to Eden's notes.     Pt would like a call back for an update on appt with [facility], if possible    Phone #:  [phone]

## 2020-10-02 NOTE — TELEPHONE ENCOUNTER
----- Message from Linda Holloway sent at 10/2/2020  4:12 PM EDT -----  Regarding: CALLED BACK  This patient called back to speak to Steffi.  He is waiting for anyone to call him back.

## 2020-10-09 ENCOUNTER — TELEPHONE (OUTPATIENT)
Dept: ONCOLOGY | Facility: CLINIC | Age: 50
End: 2020-10-09

## 2020-10-09 ENCOUNTER — TELEPHONE (OUTPATIENT)
Dept: PSYCHIATRY | Facility: HOSPITAL | Age: 50
End: 2020-10-09

## 2020-10-09 NOTE — TELEPHONE ENCOUNTER
Caller: Ludmila    Relationship: [facility]    Best call back number: [phone]    What form or medical record are you requesting:Tiffany Donovan     How would you like to receive the form or medical records (pick-up, mail, fax): Fax  If fax, what is the fax number: [phone]    Timeframe paperwork needed: Today before 3pm

## 2020-10-09 NOTE — TELEPHONE ENCOUNTER
Supportive Oncology Services    Call placed to pt at regularly scheduled apt time. LM with support and request for return call to reschedule as interested.

## 2020-10-09 NOTE — TELEPHONE ENCOUNTER
----- Message from Marty Georges MD sent at 10/9/2020 12:54 PM EDT -----  Regarding: RE: Jace larkin/ [facility]  It looks like he saw Dr. Giles at [facility] again yesterday, so I suspect he's going to transfer his care there. Really frustrating. I've sent him a message. Not sure if he'll respond or not. [facility]  ----- Message -----  From: Eden Baltazar  Sent: 10/9/2020  10:59 AM EDT  To: Marty Georges MD  Subject: RE: Jace larkin/ [facility]                                 I agree completely with you, this is very frustrating. I have just called them again and was told no they still have his case in review. I explained to them when I first called I was told 10 days, then called this past Tuesday and was told 14 days, and now they are saying there is no time line they will know by. I even told them you have spoken with the MD there and they still didn't give me any info as to when they will know if he is going to be approved to be seen or not. I really don't know what else to do? I am very sorry I and I know this is frustrating. If you would like me to refer somewhere else I don't mind, just let me know.   ----- Message -----  From: Marty Georges MD  Sent: 10/9/2020   9:15 AM EDT  To: Eden Baltazar  Subject: RE: Jace Alvarado                                 Any new updates? This is so frustrating! I've communicated with the doc I want him to see. She's sent messages to her staff too. [facility]  ----- Message -----  From: Eden Baltazar  Sent: 10/7/2020   9:22 AM EDT  To: Marty Georges MD  Subject: Jace larkin/ [facility]                                     I just wanted to give you a status update. I just called them and they said its still in review wither he can be seen or not b/c his insurance is out of network with them. I didn't know if you wanted to wait and see what they say or refer somewhere else that is in network? They said it can take up to 2 weeks is what they are saying now, Friday will be 2 weeks since I called and referred him.  ----- Message  -----  From: Brenda Hoang RegSched Rep  Sent: 10/2/2020  10:07 AM EDT  To: Eden Harmon,    I called [facility], they said it is still in review. Advised to check back on Tuesday.    Thank you! Ariela

## 2020-10-12 ENCOUNTER — TELEPHONE (OUTPATIENT)
Dept: ONCOLOGY | Facility: CLINIC | Age: 50
End: 2020-10-12

## 2022-01-01 NOTE — PROGRESS NOTES
REFERRING PROVIDER:    Jami Lazo MD  [address] [place] [id number]    REASON FOR FOLLOW UP: Metastatic colon cancer    HISTORY OF PRESENT ILLNESS:  Silver Sloan is a 49 y.o. male who is here today to initiate therapy with FOLFIRI for metastatic colon cancer.    About a month prior to presenting, he developed epigastric pain that gradually worsened to include right upper quadrant abdominal pain.  An H2 blocker did not help.  He developed anorexia and diarrhea.  He denies any change in his stool or constipation.    He lives in [facility].  He presented to [facility] for further care.  CT imaging indicated liver lesions.  He had a CT-guided liver biopsy performed on 10/14/2019 with pathology consistent with adenocarcinoma metastatic from colon.  He had a PET scan performed on 10/18/2019 which revealed a 2.7 cm mass in the sigmoid colon thought to be the primary malignancy.  The liver is enlarged measuring 22 cm.  There is a 14.3 cm mass in the right lobe of the liver with an SUV of 6.52 and an 11 cm mass in the left lobe of the liver with an SUV of 6.38.  There is a 1.5 cm lesion in the inferior right lobe of the liver with an SUV of 4.39.  There was no other evidence for metastatic disease.  There is no lymphadenopathy in the neck chest abdomen pelvis or retroperitoneum.  Multinodular goiter with thyromegaly was visualized.  No bowel obstruction was visualized.  An umbilical hernia and a left inguinal hernia were visualized.  There is some degenerative disc disease in the lumbar spine.    After review with Dr. Georges, the decision was made to proceed with palliative FOLFIRI.  The patient has undergone formal education returns today for his first cycle.  He is quite anxious regarding starting treatment.  His abdominal pain has been reasonably well controlled and is much improved from his initial presentation 1 month ago.  He does continue with the oxycodone every  4-6 hours as needed.    I have again reviewed signs or symptoms for which she should call our office including a fever greater than 100.5, or any uncontrolled symptoms including nausea, vomiting, diarrhea, or constipation.    His labs today are unremarkable.    He is ready to proceed with treatment.            Past Medical History:   Diagnosis Date   • Hyperlipidemia    • Hypertension    • Metastatic adenocarcinoma (CMS/HCC) 2019    Liver tumor       Past Surgical History:   Procedure Laterality Date   • LIVER BIOPSY  2019    Core needle biopsy       SOCIAL HISTORY:   reports that he has never smoked. He has never used smokeless tobacco. He reports that he drinks alcohol. He reports that he does not use drugs.    FAMILY HISTORY:  family history includes Brain cancer in his mother; Cancer in an other family member; Colon cancer in an other family member; Depression in an other family member; Hypertension in an other family member; Thyroid cancer in his mother.  Maternal grandfather with a history of colon cancer.  Brother with a history of multiple colon polyps.    ALLERGIES:  No Known Allergies    MEDICATIONS:  The medication list has been reviewed with the patient by the medical assistant, and the list has been updated in the electronic medical record, which I reviewed.  Medication dosages and frequencies were confirmed to be accurate.    Review of Systems   Review of Systems   Constitutional: Positive for appetite change, fatigue and unexpected weight change. Negative for chills and fever.        Stable, unchanged    HENT: Negative for congestion, dental problem, ear pain, hearing loss, mouth sores, nosebleeds, postnasal drip, rhinorrhea, tinnitus and trouble swallowing.    Eyes: Negative.    Respiratory: Negative for cough, chest tightness, shortness of breath, wheezing and stridor.    Cardiovascular: Negative for chest pain, palpitations and leg swelling.   Gastrointestinal: Positive for abdominal pain and  "nausea. Negative for abdominal distention, blood in stool, constipation, diarrhea and vomiting.        Stable    Endocrine: Negative.    Genitourinary: Negative for decreased urine volume, difficulty urinating, dysuria, flank pain, frequency, hematuria, scrotal swelling, testicular pain and urgency.   Musculoskeletal: Negative for arthralgias, back pain and joint swelling.   Allergic/Immunologic: Negative.    Neurological: Negative for dizziness, seizures, syncope, weakness, light-headedness, numbness and headaches.   Hematological: Negative for adenopathy. Does not bruise/bleed easily.   Psychiatric/Behavioral: Negative for confusion and sleep disturbance. The patient is not nervous/anxious.    All other systems reviewed and are negative.      Vitals:    11/04/19 0837   BP: 138/85   Pulse: 98   Resp: 16   Temp: 98.2 °F (36.8 °C)   SpO2: 97%   Weight: 105 kg (231 lb 6.4 oz)   Height: 183 cm (72.05\")   PainSc: 0-No pain       Physical Exam   Constitutional: He is oriented to person, place, and time. He appears well-developed and well-nourished. No distress.   HENT:   Head: Normocephalic and atraumatic. Hair is normal.   Mouth/Throat: Oropharynx is clear and moist.   Eyes: Conjunctivae, EOM and lids are normal. Pupils are equal.   Neck: Neck supple. No JVD present. No thyroid mass and no thyromegaly present.   Cardiovascular: Normal rate and regular rhythm. Exam reveals no gallop and no friction rub.   No murmur heard.  Pulmonary/Chest: Effort normal and breath sounds normal. No respiratory distress. He has no wheezes. He has no rales.   Abdominal: Soft. He exhibits no distension and no mass. There is no splenomegaly or hepatomegaly. There is tenderness (no tenderness to palpation on exam today ) in the right upper quadrant. There is no guarding.   Musculoskeletal: Normal range of motion. He exhibits no edema, tenderness or deformity.   Lymphadenopathy:     He has no cervical adenopathy.     He has no axillary " adenopathy.        Right: No inguinal and no supraclavicular adenopathy present.        Left: No inguinal and no supraclavicular adenopathy present.   Neurological: He is alert and oriented to person, place, and time. He has normal strength.   Skin: Skin is warm and dry. No rash noted.   Psychiatric: He has a normal mood and affect. His behavior is normal. Judgment and thought content normal. Cognition and memory are normal.   Nursing note and vitals reviewed.      DIAGNOSTIC DATA:    Results for orders placed or performed in visit on 11/04/19   CBC Auto Differential   Result Value Ref Range    WBC 10.17 3.40 - 10.80 10*3/mm3    RBC 5.49 4.14 - 5.80 10*6/mm3    Hemoglobin 14.1 13.0 - 17.7 g/dL    Hematocrit 44.5 37.5 - 51.0 %    MCV 81.1 79.0 - 97.0 fL    MCH 25.7 (L) 26.6 - 33.0 pg    MCHC 31.7 31.5 - 35.7 g/dL    RDW 14.6 12.3 - 15.4 %    RDW-SD 42.5 37.0 - 54.0 fl    MPV 8.7 6.0 - 12.0 fL    Platelets 359 140 - 450 10*3/mm3    Neutrophil % 64.7 42.7 - 76.0 %    Lymphocyte % 16.7 (L) 19.6 - 45.3 %    Monocyte % 7.2 5.0 - 12.0 %    Eosinophil % 10.3 (H) 0.3 - 6.2 %    Basophil % 0.6 0.0 - 1.5 %    Immature Grans % 0.5 0.0 - 0.5 %    Neutrophils, Absolute 6.58 1.70 - 7.00 10*3/mm3    Lymphocytes, Absolute 1.70 0.70 - 3.10 10*3/mm3    Monocytes, Absolute 0.73 0.10 - 0.90 10*3/mm3    Eosinophils, Absolute 1.05 (H) 0.00 - 0.40 10*3/mm3    Basophils, Absolute 0.06 0.00 - 0.20 10*3/mm3    Immature Grans, Absolute 0.05 0.00 - 0.05 10*3/mm3    nRBC 0.0 0.0 - 0.2 /100 WBC       IMAGING:    None available for review    ASSESSMENT:  This is a 49 y.o. male with:  1.  Metastatic colon cancer: There is a 2.7 cm hypermetabolic lesion in the sigmoid colon on PET scan.  He has not had a colonoscopy or biopsy of this.  He presented with right upper quadrant abdominal pain.  He has 3 lesions in the liver 2 of which are very large and 1 of which is much smaller.  A biopsy of 1 of these lesions has revealed metastatic colon cancer.   Further molecular testing has been sent in [place].  He lives in [place] but has a sibling that lives here in [place].  He desires to be treated here in [place].  Dr. Georges advised him, his brother, and his sister today that this is treatable but not likely to be curable.  His brother inquired about the possibility of local therapy to liver.  The question was brought up regarding local therapy to the liver, though Dr. Georges felt it would be more beneficial to proceed with systemic therapy to shrink the tumors prior to considering this.  Nonetheless, they desired referral to Dr. Painting and this referral was made.  The patient underwent MediPort placement in [place].  Baseline CMP and CEA were obtained.  Due to his young age and family history he was referred to genetic counseling.  The  PET images have been requested for review.  The decision was made to proceed with palliative FOLFIRI.  We may tailor this based on molecular results and depending on his response and plans for local therapy to the liver.  We might consider adding bevacizumab or cetuximab/Panitumumab.      The patient returns today following formal chemotherapy education on FOLFIRI.  He will proceed with his first cycle today.      2.  Cancer related pain: He continues oxycodone 5 to 10 mg every 6 hours as needed.  He does understand that our office will be the only office providing narcotics.  His pain is been stable.  He does not require refill today    PLAN:   1.  The patient will proceed with his first cycle of palliative FOLFIRI today  2.  He has been referred to Dr. Painting with surgical oncology to consider the possibility of local therapy to the liver and perhaps the colon at some point.  The patient's brother who was present today has worked with Dr. Painting in the past.  3.  PET images have been requested for Dr. Georges's review and are in the process of being uploaded into our imaging system.  4.  We await the results  of the genetic panel sent by pathology in [place].  5.  The patient has been referred to genetic counseling  6.  MD visit in 2 weeks in conjunction with cycle #2 of therapy  7.  We will plan to repeat CT imaging following 4 cycles of chemotherapy  8.  As noted above, I have asked the patient to call with any uncontrolled symptoms including a fever greater than 100.5.  He has verbalized understanding   51